# Patient Record
Sex: FEMALE | Race: WHITE | Employment: OTHER | URBAN - METROPOLITAN AREA
[De-identification: names, ages, dates, MRNs, and addresses within clinical notes are randomized per-mention and may not be internally consistent; named-entity substitution may affect disease eponyms.]

---

## 2017-08-28 ENCOUNTER — HOSPITAL ENCOUNTER (OUTPATIENT)
Dept: CT IMAGING | Age: 27
Discharge: HOME | End: 2017-08-28
Attending: UROLOGY | Admitting: UROLOGY

## 2017-08-28 DIAGNOSIS — R31.0 GROSS HEMATURIA: ICD-10-CM

## 2017-08-28 DIAGNOSIS — R35.0 FREQUENCY OF URINATION: ICD-10-CM

## 2017-08-28 DIAGNOSIS — R35.0 FREQUENCY OF MICTURITION: ICD-10-CM

## 2021-01-05 ENCOUNTER — OFFICE VISIT (OUTPATIENT)
Dept: INTERNAL MEDICINE CLINIC | Age: 31
End: 2021-01-05
Payer: MEDICARE

## 2021-01-05 VITALS
TEMPERATURE: 97.5 F | OXYGEN SATURATION: 97 % | DIASTOLIC BLOOD PRESSURE: 60 MMHG | HEIGHT: 65 IN | HEART RATE: 52 BPM | SYSTOLIC BLOOD PRESSURE: 96 MMHG | BODY MASS INDEX: 24.76 KG/M2 | WEIGHT: 148.6 LBS

## 2021-01-05 DIAGNOSIS — I73.00 RAYNAUD'S DISEASE WITHOUT GANGRENE: ICD-10-CM

## 2021-01-05 DIAGNOSIS — G43.109 MIGRAINE WITH AURA AND WITHOUT STATUS MIGRAINOSUS, NOT INTRACTABLE: ICD-10-CM

## 2021-01-05 DIAGNOSIS — M32.8 OTHER FORMS OF SYSTEMIC LUPUS ERYTHEMATOSUS, UNSPECIFIED ORGAN INVOLVEMENT STATUS (HCC): ICD-10-CM

## 2021-01-05 DIAGNOSIS — R21 RASH OF MULTIPLE FINGERS OF BOTH HANDS: ICD-10-CM

## 2021-01-05 DIAGNOSIS — M65.4 DE QUERVAIN'S TENOSYNOVITIS, BILATERAL: ICD-10-CM

## 2021-01-05 DIAGNOSIS — M54.2 NECK PAIN: Primary | ICD-10-CM

## 2021-01-05 PROBLEM — Z86.2 HISTORY OF ANTIPHOSPHOLIPID SYNDROME: Status: ACTIVE | Noted: 2021-01-05

## 2021-01-05 PROCEDURE — 1036F TOBACCO NON-USER: CPT | Performed by: INTERNAL MEDICINE

## 2021-01-05 PROCEDURE — G8420 CALC BMI NORM PARAMETERS: HCPCS | Performed by: INTERNAL MEDICINE

## 2021-01-05 PROCEDURE — 99204 OFFICE O/P NEW MOD 45 MIN: CPT | Performed by: INTERNAL MEDICINE

## 2021-01-05 PROCEDURE — G8427 DOCREV CUR MEDS BY ELIG CLIN: HCPCS | Performed by: INTERNAL MEDICINE

## 2021-01-05 PROCEDURE — G8484 FLU IMMUNIZE NO ADMIN: HCPCS | Performed by: INTERNAL MEDICINE

## 2021-01-05 RX ORDER — RIMEGEPANT SULFATE 75 MG/75MG
TABLET, ORALLY DISINTEGRATING ORAL PRN
COMMUNITY
End: 2022-03-31 | Stop reason: SDUPTHER

## 2021-01-05 RX ORDER — HYDROXYZINE 50 MG/1
50 TABLET, FILM COATED ORAL 3 TIMES DAILY PRN
COMMUNITY

## 2021-01-05 RX ORDER — AMLODIPINE BESYLATE 5 MG/1
5 TABLET ORAL DAILY
Qty: 30 TABLET | Refills: 3 | Status: SHIPPED | OUTPATIENT
Start: 2021-01-05 | End: 2021-02-17

## 2021-01-05 RX ORDER — MOMETASONE FUROATE 1 MG/G
CREAM TOPICAL
Qty: 15 G | Refills: 2 | Status: SHIPPED | OUTPATIENT
Start: 2021-01-05

## 2021-01-05 SDOH — ECONOMIC STABILITY: FOOD INSECURITY: WITHIN THE PAST 12 MONTHS, YOU WORRIED THAT YOUR FOOD WOULD RUN OUT BEFORE YOU GOT MONEY TO BUY MORE.: NEVER TRUE

## 2021-01-05 SDOH — ECONOMIC STABILITY: FOOD INSECURITY: WITHIN THE PAST 12 MONTHS, THE FOOD YOU BOUGHT JUST DIDN'T LAST AND YOU DIDN'T HAVE MONEY TO GET MORE.: NEVER TRUE

## 2021-01-05 ASSESSMENT — PATIENT HEALTH QUESTIONNAIRE - PHQ9
SUM OF ALL RESPONSES TO PHQ QUESTIONS 1-9: 0
SUM OF ALL RESPONSES TO PHQ QUESTIONS 1-9: 0
1. LITTLE INTEREST OR PLEASURE IN DOING THINGS: 0
2. FEELING DOWN, DEPRESSED OR HOPELESS: 0

## 2021-01-06 NOTE — PROGRESS NOTES
2021    Dorcas Alfaro (:  1990) is a 35 y.o. female, here for evaluation of the following medical concerns:    Chief Complaint   Patient presents with    New Patient        HPI    Neck pain - history of arthritis in the neck. Was seeing pain management. She has had an MRI before, someone recommended seeing a neurosurgeon. She has been on sublocade, last dose in Nov, may come off of it. She had carpal tunnel release, still has DeQuervain's tenosynovitis, had been planning on surgery before she moved back to [place]. Migraine headaches - chronic. Has done well on Emgality, and Nurtec as needed. Has tried Topamax, several triptans in the past. Also got some trigger point injections in the neck which were helpful. She has a history of SLE. Also has a history of seizures, does not see neurology as per her report, the seizures were due to the SLE. She has been on Benlysta, not sure that it has helped. She has a history of Raynaud's, has been more symptomatic in the cold weather. She has had a rash on her fingers, tender and a bit itchy. Review of Systems    Prior to Visit Medications    Medication Sig Taking? Authorizing Provider   Rimegepant Sulfate (NURTEC) 75 MG TBDP Take by mouth Yes Historical Provider, MD   Albuterol Sulfate (PROAIR HFA IN) Inhale 1 puff into the lungs every 4 hours as needed Yes Historical Provider, MD   hydrOXYzine (ATARAX) 50 MG tablet Take 50 mg by mouth 3 times daily as needed for Anxiety  Yes Historical Provider, MD   mometasone (ELOCON) 0.1 % cream Apply topically daily.  Yes Nj Rawls MD   Galcanezumab-gnlm 120 MG/ML SOAJ Inject 120 mg into the skin every 30 days Yes Nj Rawls MD   amLODIPine (NORVASC) 5 MG tablet Take 1 tablet by mouth daily Yes Nj Rawls MD   DULoxetine (CYMBALTA) 60 MG extended release capsule Take 60 mg by mouth daily Yes Historical Provider, MD   traZODone (DESYREL) 50 MG tablet Take 50 mg by mouth nightly  Yes Historical partner: Not on file     Emotionally abused: Not on file     Physically abused: Not on file     Forced sexual activity: Not on file   Other Topics Concern    Not on file   Social History Narrative    Not on file        Family History   Problem Relation Age of Onset    Hypertension Mother     Thyroid Disease Mother     Depression Father     Depression Sister     Anxiety Disorder Sister     Migraines Sister     Glaucoma Maternal Grandmother     Heart Attack Maternal Grandfather     Heart Disease Maternal Grandfather     Cancer Paternal Grandmother     Cancer Paternal Grandfather        Vitals:    01/05/21 1008   BP: 96/60   Pulse: 52   Temp: 97.5 °F (36.4 °C)   TempSrc: Oral   SpO2: 97%   Weight: 148 lb 9.6 oz (67.4 kg)   Height: 5' 5\" (1.651 m)     Estimated body mass index is 24.73 kg/m² as calculated from the following:    Height as of this encounter: 5' 5\" (1.651 m). Weight as of this encounter: 148 lb 9.6 oz (67.4 kg). Physical Exam  Vitals signs reviewed. Constitutional:       General: She is not in acute distress. Appearance: Normal appearance. She is well-developed. HENT:      Head: Normocephalic and atraumatic. Cardiovascular:      Rate and Rhythm: Normal rate and regular rhythm. Heart sounds: Normal heart sounds. Pulmonary:      Effort: Pulmonary effort is normal. No respiratory distress. Breath sounds: Normal breath sounds. Musculoskeletal:      Right lower leg: No edema. Left lower leg: No edema. Skin:     General: Skin is warm and dry. Findings: Rash (erythematous nodules on multiple fingers of both hands) present. Neurological:      General: No focal deficit present. Mental Status: She is alert. Psychiatric:         Mood and Affect: Mood normal.         Behavior: Behavior normal.         Thought Content: Thought content normal.         Judgment: Judgment normal.         ASSESSMENT/PLAN:  1.  Neck pain  - refer to [address] [place], Marva Landeros MD, Physical Medicine and Rehabilitation, [facility]    2. Migraine with aura and without status migrainosus, not intractable  - stable  - continue emgality  - continue nurtec as needed  - Galcanezumab-gnlm 120 MG/ML SOAJ; Inject 120 mg into the skin every 30 days  Dispense: 1 pen; Refill: 5  - [facility] - Lloyd Gomez MD, Neurology, [facility]    3. Other forms of systemic lupus erythematosus, unspecified organ involvement status ([facility]  - refer to rheumatologist  - Janie Crum MD, Rheumatology, [facility]    4. De Quervain's tenosynovitis, bilateral  - refer to hand surgeon  - Taryn Raphael MD, Hand Surgery (Hand, Wrist, Elbow), [facility]    5. Raynaud's disease without gangrene  - start amlodipine 5mg daily  - will see rheumatology    6. Rash of multiple fingers of both hands  - suspect dyshidrotic eczema, less likely related to Raynaud's  - mometasone 0.1% cream once daily    Return in about 3 months (around 4/5/2021).

## 2021-01-18 ENCOUNTER — TELEPHONE (OUTPATIENT)
Dept: INTERNAL MEDICINE CLINIC | Age: 31
End: 2021-01-18

## 2021-01-18 DIAGNOSIS — N92.6 ABNORMAL MENSTRUAL CYCLE: Primary | ICD-10-CM

## 2021-01-18 DIAGNOSIS — M32.8 OTHER FORMS OF SYSTEMIC LUPUS ERYTHEMATOSUS, UNSPECIFIED ORGAN INVOLVEMENT STATUS (HCC): ICD-10-CM

## 2021-01-18 NOTE — TELEPHONE ENCOUNTER
Pt called stating that she had an appt a couple weeks ago. Pt stated that MD was supposed to order blood work for her and never did.   Pt has not had a cycle so worried about thyroid as well

## 2021-01-22 ENCOUNTER — OFFICE VISIT (OUTPATIENT)
Dept: RHEUMATOLOGY | Age: 31
End: 2021-01-22
Payer: MEDICARE

## 2021-01-22 VITALS — HEIGHT: 65 IN | BODY MASS INDEX: 24.66 KG/M2 | TEMPERATURE: 97.3 F | WEIGHT: 148 LBS

## 2021-01-22 DIAGNOSIS — M25.59 PAIN IN OTHER JOINT: ICD-10-CM

## 2021-01-22 DIAGNOSIS — M32.9 SYSTEMIC LUPUS ERYTHEMATOSUS, UNSPECIFIED SLE TYPE, UNSPECIFIED ORGAN INVOLVEMENT STATUS (HCC): Primary | ICD-10-CM

## 2021-01-22 DIAGNOSIS — M32.8 OTHER FORMS OF SYSTEMIC LUPUS ERYTHEMATOSUS, UNSPECIFIED ORGAN INVOLVEMENT STATUS (HCC): ICD-10-CM

## 2021-01-22 DIAGNOSIS — M32.9 SYSTEMIC LUPUS ERYTHEMATOSUS, UNSPECIFIED SLE TYPE, UNSPECIFIED ORGAN INVOLVEMENT STATUS (HCC): ICD-10-CM

## 2021-01-22 DIAGNOSIS — M79.7 FIBROMYALGIA: ICD-10-CM

## 2021-01-22 DIAGNOSIS — N92.6 ABNORMAL MENSTRUAL CYCLE: ICD-10-CM

## 2021-01-22 LAB
A/G RATIO: 1.7 (ref 1.1–2.2)
ALBUMIN SERPL-MCNC: 4.1 G/DL (ref 3.4–5)
ALP BLD-CCNC: 79 U/L (ref 40–129)
ALT SERPL-CCNC: 35 U/L (ref 10–40)
ANION GAP SERPL CALCULATED.3IONS-SCNC: 10 MMOL/L (ref 3–16)
AST SERPL-CCNC: 47 U/L (ref 15–37)
BACTERIA: ABNORMAL /HPF
BILIRUB SERPL-MCNC: 0.4 MG/DL (ref 0–1)
BILIRUBIN URINE: NEGATIVE
BLOOD, URINE: NEGATIVE
BUN BLDV-MCNC: 15 MG/DL (ref 7–20)
C-REACTIVE PROTEIN: 0.4 MG/L (ref 0–5.1)
C3 COMPLEMENT: 80.9 MG/DL (ref 90–180)
C4 COMPLEMENT: 9 MG/DL (ref 10–40)
CALCIUM SERPL-MCNC: 9.7 MG/DL (ref 8.3–10.6)
CHLORIDE BLD-SCNC: 102 MMOL/L (ref 99–110)
CLARITY: ABNORMAL
CO2: 28 MMOL/L (ref 21–32)
COLOR: YELLOW
CREAT SERPL-MCNC: 0.7 MG/DL (ref 0.6–1.1)
CREATININE URINE: 199.7 MG/DL (ref 28–259)
EPITHELIAL CELLS, UA: 16 /HPF (ref 0–5)
GFR AFRICAN AMERICAN: >60
GFR NON-AFRICAN AMERICAN: >60
GLOBULIN: 2.4 G/DL
GLUCOSE BLD-MCNC: 86 MG/DL (ref 70–99)
GLUCOSE URINE: NEGATIVE MG/DL
HBV SURFACE AB TITR SER: >1000 MIU/ML
HEPATITIS B CORE IGM ANTIBODY: NORMAL
HEPATITIS B SURFACE ANTIGEN INTERPRETATION: NORMAL
HEPATITIS C ANTIBODY INTERPRETATION: NORMAL
HYALINE CASTS: 2 /LPF (ref 0–8)
KETONES, URINE: NEGATIVE MG/DL
LEUKOCYTE ESTERASE, URINE: ABNORMAL
MICROSCOPIC EXAMINATION: YES
NITRITE, URINE: NEGATIVE
PH UA: 6 (ref 5–8)
POTASSIUM SERPL-SCNC: 3.7 MMOL/L (ref 3.5–5.1)
PROTEIN PROTEIN: 15 MG/DL
PROTEIN UA: NEGATIVE MG/DL
PROTEIN/CREAT RATIO: 0.1 MG/DL
RBC UA: 4 /HPF (ref 0–4)
RHEUMATOID FACTOR: <10 IU/ML
SEDIMENTATION RATE, ERYTHROCYTE: 6 MM/HR (ref 0–20)
SODIUM BLD-SCNC: 140 MMOL/L (ref 136–145)
SPECIFIC GRAVITY UA: 1.02 (ref 1–1.03)
T4 FREE: 0.7 NG/DL (ref 0.9–1.8)
TOTAL PROTEIN: 6.5 G/DL (ref 6.4–8.2)
TSH REFLEX: 0.03 UIU/ML (ref 0.27–4.2)
URINE TYPE: ABNORMAL
UROBILINOGEN, URINE: 0.2 E.U./DL
WBC UA: 7 /HPF (ref 0–5)

## 2021-01-22 PROCEDURE — G8427 DOCREV CUR MEDS BY ELIG CLIN: HCPCS | Performed by: INTERNAL MEDICINE

## 2021-01-22 PROCEDURE — G8420 CALC BMI NORM PARAMETERS: HCPCS | Performed by: INTERNAL MEDICINE

## 2021-01-22 PROCEDURE — 99205 OFFICE O/P NEW HI 60 MIN: CPT | Performed by: INTERNAL MEDICINE

## 2021-01-22 PROCEDURE — G8484 FLU IMMUNIZE NO ADMIN: HCPCS | Performed by: INTERNAL MEDICINE

## 2021-01-22 NOTE — PROGRESS NOTES
[address], MD                                                           [address] [place] [address] Garrett Lal 1019 (S) [phone] (F)      Dear Dr. Danny Salazar MD:  Please find Rheumatology assessment. Thank you for giving me the opportunity to be involved in Sean Mcbride's care and I look forward following Sean Salazar along with you. If you have any questions or concerns please feel free to reach me. Note is transcribed using voice recognition software. Inadvertent computerized transcription errors may be present. Patient identification: Sean Mcbride,: 1990,30 y.o. Sex: female     [address] was seen today for follow-up and lupus. Diagnoses and all orders for this visit:    Systemic lupus erythematosus, unspecified SLE type, unspecified organ involvement status ([facility]  -     C-Reactive Protein; Future  -     Sedimentation Rate; Future  -     CBC Auto Differential; Future  -     C3 Complement; Future  -     C4 Complement; Future  -     Urinalysis; Future  -     Protein / creatinine ratio, urine; Future  -     Comprehensive Metabolic Panel; Future  -     Anti-DNA Ab, Double-Stranded IgG Titer; Future  -     Quantiferon, Incubated; Future  -     Hepatitis B Surface Antigen; Future  -     Hepatitis B Core Antibody, IgM; Future  -     Hepatitis B Surface Antibody; Future  -     Hepatitis C Antibody; Future  -     IRIS Reflex to Antibody [place]; Future    Fibromyalgia    Pain in other joint  -     Cyclic Citrul Peptide Antibody, IgG; Future  -     Rheumatoid Factor; Future        Known history of SLE-diagnosis -based on arthritis, photosensitivity, photosensitive rash, patchy alopecia, possible pleuritis.   Abnormal labs including positive IRIS, double-stranded DNA, SSA, depressed C3-C4. Also has noninflammatory discomfort from fibromyalgia causing generalized musculoskeletal pain. Tried several medications-most of them for short duration-Imuran, methotrexate-leukopenia, CellCept-some kind of blood abnormalities, hydroxychloroquine-abnormal eye exam per patient, has been maintained on Benlysta IV therapy for about 5 years, last infusion was in October 2020. She does not notice any change in her symptoms. She is on Cymbalta for fibromyalgia. Is on disability because of PTSD and lupus. Today-objectively she does have several tender joints and trace synovitis in her finger joints mainly PIPs, MCPs as well as MTPs. No other appreciable clinical findings of active lupus. Has been utilizing prednisone as needed for symptoms. Raynaud phenomena-persistent, no tissue loss. Is on amlodipine. Plan-  Check lupus activity markers as above. Hydroxychloroquine has shown to decrease lupus flares in multiple studies, I would definitely recommend resuming hydroxychloroquine after a thorough baseline eye exam.  Patient does not seem to be keen on doing that. She prefers to go back on IV Benlysta which she has been taking for last 6 years . Majority of her musculoskeletal symptoms are from fibromyalgia as well, therefore recommend continuing Cymbalta. Role of physical reconditioning, good quality of sleep, optimal management of mental health conditions was also discussed with the patient. I will follow-up on the labs and also do prior authorization for Benlysta IV. She is aware that it could possibly take about 4 to 6 weeks to get the list authorized. Return visit in 2 months. Patient indicates understanding and agrees with the management plan. I reviewed patient's history, referral documents and electronic medical records.   Outside rheumatology records are scanned and reviewed as well as information from care everywhere is reviewed including rheumatology note, labs. #######################################################################    REASON FOR CONSULTATION:  Patient is being seen at the request of  Griselda Salinas MD / Felice Mccormick MD for evaluation and management of lupus and fibromyalgia. HISTORY OF PRESENT ILLNESS:  35 y.o. female with established diagnosis of SLE based on her symptoms as well as serologies-2010, predominantly affecting joints, also has history of fibromyalgia and joint hypermobility is here to establish care. Since lupus diagnosis, she has tried several medications listed in assessment and plan, has been maintained on IV Benlysta which is the only medication she has tolerated well in last 5 years. She tells me that she continues to have breakthrough flares of arthritis on Benlysta for which she utilizes prednisone. She has not had Benlysta since October, does feel more achy, stiff otherwise does not notice much change in her symptoms. Fatigue, fogginess, musculoskeletal pain is persistent. No active alopecia, rashes, pleurisy, Raynaud's phenomenon, GI/ symptoms or any focal muscle weakness. She has fibromyalgia that is also causing generalized hyperesthesia and muscle pain. No history of any DVT, pregnancy loss. No sicca symptoms, mucositis, alopecia GI or  symptoms. All other ROS are negative. PMH, PSH,Social history , Meds reviewed. PHYSICAL EXAM:    Vitals:    Temp 97.3 °F (36.3 °C) (Skin)   Ht 5' 5\" (1.651 m)   Wt 148 lb (67.1 kg)   BMI 24.63 kg/m²   AA)x3 well nourished, and well groomed, normal judgement. MKS:   Tenderness and trace synovitis noticed across the PIPs bilaterally, right hand worse than left. Tenderness across MCPs especially second and third. Full fist, right hand feels stiff. Knees are subjectively sore. Ankle and feet joints are nontender, no swelling or synovitis. Normal gait and muscle strength in the upper and lower extremities.   She is using cane, tells me that it just for balance. Skin: No rashes, no induration or skin thickening or nodules. HEENT: Normal lids, lacrimal glands and pupils. No oral or nasal ulcers. Salivary glands reveal no evidence of abnormality. External inspection of the ears and nose within normal limits. Neck: No adenopathy or thyroid enlargement. Chest: Normal effort, CTA bilaterally, S1-S2 regular. DATA:   Lab Results   Component Value Date    WBC 2.1 (L) 01/22/2021    HGB 11.7 (L) 01/22/2021    HCT 36.6 01/22/2021    MCV 77.4 (L) 01/22/2021     01/22/2021         Chemistry        Component Value Date/Time     01/22/2021 0854    K 3.7 01/22/2021 0854     01/22/2021 0854    CO2 28 01/22/2021 0854    BUN 15 01/22/2021 0854    CREATININE 0.7 01/22/2021 0854        Component Value Date/Time    CALCIUM 9.7 01/22/2021 0854    ALKPHOS 79 01/22/2021 0854    AST 47 (H) 01/22/2021 0854    ALT 35 01/22/2021 0854    BILITOT 0.4 01/22/2021 0854          No results found for: [facility]  Lab Results   Component Value Date    CRP 0.4 01/22/2021     Lab Results   Component Value Date    SEDRATE 6 01/22/2021     No results found for: CKTOTAL  No results found for: TSH        Radiology Review:     A/P- See above.

## 2021-01-24 LAB
ANTI-CENTROMERE B IGG: 0.2 AI (ref 0–0.9)
ANTI-CHROMATIN IGG: 2.5 AI (ref 0–0.9)
ANTI-DSDNA IGG: 17 IU/ML (ref 0–9)
ANTI-JO1 IGG: <0.2 AI (ref 0–0.9)
ANTI-NUCLEAR ANTIBODY (ANA): POSITIVE
ANTI-RIBOSOMAL P IGG: <0.2 AI (ref 0–0.9)
ANTI-RNP IGG: 0.4 AI (ref 0–0.9)
ANTI-SCL70 IGG: <0.2 AI (ref 0–0.9)
ANTI-SMITH IGG: 0.2 AI (ref 0–0.9)
ANTI-SMRNP IGG: <0.2 AI (ref 0–0.9)
ANTI-SS-A IGG: >8 AI (ref 0–0.9)
ANTI-SS-B IGG: <0.2 AI (ref 0–0.9)
QUANTI TB GOLD PLUS: NEGATIVE
QUANTI TB1 MINUS NIL: 0 IU/ML (ref 0–0.34)
QUANTI TB2 MINUS NIL: 0 IU/ML (ref 0–0.34)
QUANTIFERON MITOGEN: >10 IU/ML
QUANTIFERON NIL: 0.02 IU/ML

## 2021-01-25 ENCOUNTER — OFFICE VISIT (OUTPATIENT)
Dept: ORTHOPEDIC SURGERY | Age: 31
End: 2021-01-25
Payer: MEDICARE

## 2021-01-25 DIAGNOSIS — M65.4 DE QUERVAIN'S TENOSYNOVITIS, BILATERAL: Primary | ICD-10-CM

## 2021-01-25 DIAGNOSIS — R79.89 LOW THYROID STIMULATING HORMONE (TSH) LEVEL: Primary | ICD-10-CM

## 2021-01-25 LAB
ANISOCYTOSIS: ABNORMAL
ATYPICAL LYMPHOCYTE RELATIVE PERCENT: 2 % (ref 0–6)
BASOPHILS ABSOLUTE: 0 K/UL (ref 0–0.2)
BASOPHILS RELATIVE PERCENT: 0 %
DSDNA ANTIBODY TITER: ABNORMAL
EOSINOPHILS ABSOLUTE: 0 K/UL (ref 0–0.6)
EOSINOPHILS RELATIVE PERCENT: 1 %
HCT VFR BLD CALC: 36.6 % (ref 36–48)
HEMATOLOGY PATH CONSULT: NORMAL
HEMATOLOGY PATH CONSULT: YES
HEMOGLOBIN: 11.7 G/DL (ref 12–16)
LYMPHOCYTES ABSOLUTE: 1 K/UL (ref 1–5.1)
LYMPHOCYTES RELATIVE PERCENT: 46 %
MCH RBC QN AUTO: 24.8 PG (ref 26–34)
MCHC RBC AUTO-ENTMCNC: 32 G/DL (ref 31–36)
MCV RBC AUTO: 77.4 FL (ref 80–100)
MICROCYTES: ABNORMAL
MONOCYTES ABSOLUTE: 0.4 K/UL (ref 0–1.3)
MONOCYTES RELATIVE PERCENT: 18 %
NEUTROPHILS ABSOLUTE: 0.7 K/UL (ref 1.7–7.7)
NEUTROPHILS RELATIVE PERCENT: 33 %
PDW BLD-RTO: 16.5 % (ref 12.4–15.4)
PLATELET # BLD: 210 K/UL (ref 135–450)
PLATELET SLIDE REVIEW: ABNORMAL
PMV BLD AUTO: 8.8 FL (ref 5–10.5)
RBC # BLD: 4.73 M/UL (ref 4–5.2)
SLIDE REVIEW: ABNORMAL
WBC # BLD: 2.1 K/UL (ref 4–11)

## 2021-01-25 PROCEDURE — G8420 CALC BMI NORM PARAMETERS: HCPCS | Performed by: ORTHOPAEDIC SURGERY

## 2021-01-25 PROCEDURE — G8427 DOCREV CUR MEDS BY ELIG CLIN: HCPCS | Performed by: ORTHOPAEDIC SURGERY

## 2021-01-25 PROCEDURE — G8484 FLU IMMUNIZE NO ADMIN: HCPCS | Performed by: ORTHOPAEDIC SURGERY

## 2021-01-25 PROCEDURE — 99203 OFFICE O/P NEW LOW 30 MIN: CPT | Performed by: ORTHOPAEDIC SURGERY

## 2021-01-25 PROCEDURE — 1036F TOBACCO NON-USER: CPT | Performed by: ORTHOPAEDIC SURGERY

## 2021-01-25 NOTE — PROGRESS NOTES
Chief Complaint   Patient presents with    Wrist Pain     Bilateral         HISTORY OF PRESENT ILLNESS: Carlton Mcallister is a  right-hand-dominant patient, disabled, here for a new problem regarding her right and left hand and wrist. Patient developed tenderness over the dorsal radial thumb and wrist approximately 10 years. She does report some occasional numbness and tingling in her small and ring fingers as well as occasionally in her thumb as well  She now describes some weakness in her fingers as well at times  Previous treatment has included corticosteroid injection and she estimates almost 10 total injections performed over the last 10 years as well as bracing and therapy. She just recently moved here from [place] and was referred here by her rheumatologist  She does have a history of bilateral carpal tunnel release performed in March and May 2020. [facility] Pain is intermittent, typically moderate in intensity, and is exacerbated by with thumb extension or pinch. The past medical history significant for lupus. She has some occasional   she was referred for a hand surgery specialty evaluation by: Dr. Avi Johnson. Medical History:  Patient's medications, allergies, past medical, surgical, social and family histories were reviewed and updated as appropriate. ROS:  Pertinent items are noted in HPI  Review of systems reviewed from Patient History Form dated on 1/25/2021 and available in the patient's chart under the Media tab. PHYSICAL EXAMINATION:   Gen/Psych: Examination reveals a pleasant individual in no acute distress. The patient is oriented to time, place and person. The patient's mood and affect are appropriate. Lymph: The lymphatic examination bilaterally reveals all areas to be without enlargement or induration. Skin intact without lymphadenopathy, discoloration, or abnormal temperature. Vascular:  There is intact, symmetric circulation in both upper extremities. Musculoskeletal       Cervical spine, shoulders, elbows:  satisfactory comfortable baseline range of motion, strength, and stability       Bilateral hand/Wrist and digits:   Satisfactory range of motion bilaterally with flexion and extension approximately 70 degrees and 70 degrees no gross instability  Negative Martinez scaphoid shift test bilaterally  Full pronation supination without discomfort  No tenderness throughout the entire wrist except for. There is tenderness over the radial wrist at the                             Bilateral first dorsal extensor compartment and a                                      positive Finkelsteins test which reproduces symptoms. No evidence of wrist effusion with some minimal swelling along the first dorsal compartment  No skin changes including erythema fluctuance or ecchymosis  Appropriate finger tenodesis and resting cascade with 5-5 EPL FPL thumb abduction  5-5 FDS FDP EDC interossei    Neurological: neg provocation testing for carpal tunnel bilaterally  Bilateral positive Tinel's at cubital tunnel  2+ brachioradialis triceps tendon reflex with negative Vashti sign bilaterally    Radiology:     X-rays obtained and reviewed in office:  Views 3  Location right wrist  Impression no evidence of acute fracture dislocation no obvious degenerative changes of the right wrist: There is some slight widening of the distal radial ulnar joint with no evidence of obvious subluxation or dislocation otherwise noted no degenerative changes or additional osseous abnormality    3 views of the left wrist: Symmetrical appearance of distal radial ulnar joint with slight widening with no obvious additional degenerative changes or other osseous abnormality throughout the left wrist      DIAGNOSTIC TESTING:radiographs of the affected hand and wrist demonstrate well-maintained articular surfaces and no signs of fracture.       IMPRESSION AND PLAN: 72-year-old female presenting with history of bilateral radial wrist pain  1. Bilateral wrist and thumb de Quervain's tendinitis. I think this is amenable to conservative care as an initial line of treatment. I have recommended a short arm thumb spica brace and injection. The patient has been treated quite extensively with conservative management in the past with multiple injections for bilateral de Quervain's as well as therapy and bracing  She does have a component of pain that may be related also to her lupus and she describes a history of Raynaud's as well that has caused some symptoms particularly in the winter months and with cold weather. We discussed the options specifically for her radial thumb and wrist pain including de Quervain's release  I do think that the patient would be an appropriate candidate to consider release considering her extensive nonoperative treatment and her persistent symptoms  Risks benefits alternatives were discussed with patient today including but not limited to infection bleeding damage tendon nerve or blood vessel need for additional procedure as well as risks of anesthesia include stroke heart attack blood clot death. We specifically discussed also incomplete relief of symptoms or no relief of symptoms after surgical intervention and specific irritation or injury to superficial branch radial nerve  After thorough discussion the patient is understanding of postoperative course and her options and would like to proceed with right de Quervain's release. Consent obtained in clinic today preoperative medical work-up initiated.   We will plan for local with MAC anesthesia

## 2021-01-26 LAB
ANA PATTERN: ABNORMAL
ANA TITER: ABNORMAL
ANTINUCLEAR AB INTERPRETIVE COMMENT: ABNORMAL
ANTINUCLEAR ANTIBODY, HEP-2, IGG: DETECTED

## 2021-01-26 RX ORDER — LEVOTHYROXINE SODIUM 0.03 MG/1
25 TABLET ORAL DAILY
Qty: 90 TABLET | Refills: 1 | Status: SHIPPED | OUTPATIENT
Start: 2021-01-26 | End: 2021-03-04

## 2021-01-26 NOTE — TELEPHONE ENCOUNTER
Pt states she was referred to Endo due to elevated thyroid levels, but can not be seen until March 9th and would like medication until able to be seen.   Please advise

## 2021-01-28 ENCOUNTER — TELEPHONE (OUTPATIENT)
Dept: RHEUMATOLOGY | Age: 31
End: 2021-01-28

## 2021-01-28 LAB — CYCLIC CITRULLINATED PEPTIDE ANTIBODY IGG: <0.5 U/ML (ref 0–2.9)

## 2021-01-28 NOTE — TELEPHONE ENCOUNTER
Medicare A and B-Will cover 80%  [facility]- will cover any additional costs. No PA needed. Follows Medicare guidelines. New start Benlysta loading dose 10 mg /kg 0.2.4 weeks then every 4 weekly.      TB quantiferon completed 1/22/21- NEG    Ok to schedule

## 2021-02-04 NOTE — PROGRESS NOTES
[facility] / [facility] ([facility]) [address] [facility], [address]    Acknowledgment of Informed Consent for Surgical or Medical Procedure and Sedation  I agree to allow doctor(s) [facility] and his/her associates or assistants, including residents and/or other qualified medical practitioner to perform the following medical treatment or procedure and to administer or direct the administration of sedation as necessary:  Procedure(s): RIGHT WRIST Ute Schaffer   My doctor has explained the following regarding the proposed procedure:   the explanation of the procedure   the benefits of the procedure   the potential problems that might occur during recuperation   the risks and side effects of the procedure which could include but are not limited to severe blood loss, infection, stroke or death   the benefits, risks and side effect of alternative procedures including the consequences of declining this procedure or any alternative procedures   the likelihood of achieving satisfactory results. I acknowledge no guarantee or assurance has been made to me regarding the results. I understand that during the course of this treatment/procedure, unforeseen conditions can occur which require an additional or different procedure. I agree to allow my physician or assistants to perform such extension of the original procedure as they may find necessary. I understand that sedation will often result in temporary impairment of memory and fine motor skills and that sedation can occasionally progress to a state of deep sedation or general anesthesia. I understand the risks of anesthesia for surgery include, but are not limited to, sore throat, hoarseness, injury to face, mouth, or teeth; nausea; headache; injury to blood vessels or nerves; death, brain damage, or paralysis.     I understand that if I have a Limitation of Treatment order in effect during my hospitalization, the order may or may not be in effect during this procedure. I give my doctor permission to give me blood or blood products. I understand that there are risks with receiving blood such as hepatitis, AIDS, fever, or allergic reaction. I acknowledge that the risks, benefits, and alternatives of this treatment have been explained to me and that no express or implied warranty has been given by the hospital, any blood bank, or any person or entity as to the blood or blood components transfused. At the discretion of my doctor, I agree to allow observers, equipment/product representatives and allow photographing, and/or televising of the procedure, provided my name or identity is maintained confidentially. I agree the hospital may dispose of or use for scientific or educational purposes any tissue, fluid, or body parts which may be removed.     ________________________________Date________Time______ am/pm  ([place] [facility])  Patient or Signature of Closest Relative or Legal Guardian    ________________________________Date________Time______am/pm      Page 1 of  1  Witness

## 2021-02-17 ENCOUNTER — OFFICE VISIT (OUTPATIENT)
Dept: INTERNAL MEDICINE CLINIC | Age: 31
End: 2021-02-17
Payer: MEDICARE

## 2021-02-17 VITALS
DIASTOLIC BLOOD PRESSURE: 62 MMHG | HEIGHT: 65 IN | OXYGEN SATURATION: 98 % | TEMPERATURE: 97.7 F | SYSTOLIC BLOOD PRESSURE: 98 MMHG | BODY MASS INDEX: 23.99 KG/M2 | HEART RATE: 96 BPM | WEIGHT: 144 LBS

## 2021-02-17 DIAGNOSIS — Z01.818 PREOP EXAMINATION: Primary | ICD-10-CM

## 2021-02-17 DIAGNOSIS — R06.83 SNORING: ICD-10-CM

## 2021-02-17 DIAGNOSIS — M65.4 DE QUERVAIN'S TENOSYNOVITIS, BILATERAL: ICD-10-CM

## 2021-02-17 PROCEDURE — G8427 DOCREV CUR MEDS BY ELIG CLIN: HCPCS | Performed by: INTERNAL MEDICINE

## 2021-02-17 PROCEDURE — G8484 FLU IMMUNIZE NO ADMIN: HCPCS | Performed by: INTERNAL MEDICINE

## 2021-02-17 PROCEDURE — G8420 CALC BMI NORM PARAMETERS: HCPCS | Performed by: INTERNAL MEDICINE

## 2021-02-17 PROCEDURE — 99214 OFFICE O/P EST MOD 30 MIN: CPT | Performed by: INTERNAL MEDICINE

## 2021-02-17 PROCEDURE — 1036F TOBACCO NON-USER: CPT | Performed by: INTERNAL MEDICINE

## 2021-02-17 RX ORDER — DULOXETIN HYDROCHLORIDE 60 MG/1
60 CAPSULE, DELAYED RELEASE ORAL DAILY
Qty: 90 CAPSULE | Refills: 1 | Status: SHIPPED | OUTPATIENT
Start: 2021-02-17 | End: 2021-08-12

## 2021-02-17 NOTE — PROGRESS NOTES
snoring, no prior sleep study. History of sleep apnea in her father. No history of lung or heart disease. Able to complete >4 METS.     Planned anesthesia: MAC   Known anesthesia problems: None   Bleeding risk: No recent or remote history of abnormal bleeding  Personal or FH of DVT/PE: No      Patient Active Problem List   Diagnosis    Systemic lupus erythematosus ([facility] [address]    History of antiphospholipid syndrome    Migraine with aura and without status migrainosus, not intractable    De Quervain's tenosynovitis, bilateral       Past Medical History:   Diagnosis Date    Anxiety     Bulimia     Depression     Fibromyalgia syndrome     Gestational hypertension, antepartum     Lupus ([facility] [address]     just diagnosised with lupus last week    Seizures ([facility] [address]      Past Surgical History:   Procedure Laterality Date    BLADDER SURGERY  2017    CARPAL TUNNEL RELEASE Bilateral 2020    LYMPHADENECTOMY  2012    TONSILLECTOMY  2013     Family History   Problem Relation Age of Onset    Hypertension Mother     Thyroid Disease Mother     Depression Father     Depression Sister     Anxiety Disorder Sister     Migraines Sister     Glaucoma Maternal Grandmother     Heart Attack Maternal Grandfather     Heart Disease Maternal Grandfather     Cancer Paternal Grandmother     Cancer Paternal Grandfather      Social History     Socioeconomic History    Marital status: Single     Spouse name: Not on file    Number of children: Not on file    Years of education: Not on file    Highest education level: Not on file   Occupational History    Not on file   Social Needs    Financial resource strain: Not hard at all   Tommie-Pedro Pablo insecurity     Worry: Never true     Inability: Never true    Transportation needs     Medical: No     Non-medical: No   Tobacco Use    Smoking status: Former Smoker     Packs/day: 0.20     Types: Cigarettes     Start date:      Quit date: 2017     Years since quittin.1    Smokeless tobacco: Former User   Substance and Sexual Activity    Alcohol use: No    Drug use: Not Currently    Sexual activity: Not on file   Lifestyle    Physical activity     Days per week: Not on file     Minutes per session: Not on file    Stress: Not on file   Relationships    Social connections     Talks on phone: Not on file     Gets together: Not on file     Attends Restoration service: Not on file     Active member of club or organization: Not on file     Attends meetings of clubs or organizations: Not on file     Relationship status: Not on file    Intimate partner violence     Fear of current or ex partner: Not on file     Emotionally abused: Not on file     Physically abused: Not on file     Forced sexual activity: Not on file   Other Topics Concern    Not on file   Social History Narrative    Not on file       Review of Systems  A comprehensive review of systems was negative except for what was noted in the HPI. Physical Exam   Constitutional: She is oriented to person, place, and time. She appears well-developed and well-nourished. No distress. HENT:   Head: Normocephalic and atraumatic. Eyes: Conjunctivae and EOM are normal. Pupils are equal, round, and reactive to light. Neck: Trachea normal and normal range of motion. Neck supple. No JVD present. Carotid bruit is not present. No mass and no thyromegaly present. Cardiovascular: Normal rate, regular rhythm, normal heart sounds and intact distal pulses. Exam reveals no gallop and no friction rub. No murmur heard. Pulmonary/Chest: Effort normal and breath sounds normal. No respiratory distress. She has no wheezes. She has no rales. Abdominal: Soft. Normal aorta and bowel sounds are normal. She exhibits no distension and no mass. There is no hepatosplenomegaly. No tenderness. Musculoskeletal: She exhibits no edema and no tenderness. Neurological: She is alert and oriented to person, place, and time. She has normal strength.  No cranial nerve deficit or sensory deficit. Coordination and gait normal.   Skin: Skin is warm and dry. No rash noted. No erythema. Psychiatric: She has a normal mood and affect. Her behavior is normal.          Assessment:       35 y.o. patient with planned surgery as above. Known risk factors for perioperative complications: None  Current medications which may produce withdrawal symptoms if withheld perioperatively: none      Plan:     1. Preop examination  - Preoperative workup as follows: none  - Change in medication regimen before surgery: Hold all medications on morning of surgery  - Prophylaxis for cardiac events with perioperative beta-blockers: Not indicated  - low risk surgery  - No contraindications to planned surgery    2. De Quervain's tenosynovitis, bilateral  - will undergo right side release    3.  Snoring  - refer to sleep medicine for further evaluation  - Thi Alfaro MD, Sleep Medicine, [facility]

## 2021-02-22 ENCOUNTER — TELEPHONE (OUTPATIENT)
Dept: RHEUMATOLOGY | Age: 31
End: 2021-02-22

## 2021-02-22 ENCOUNTER — NURSE ONLY (OUTPATIENT)
Dept: PRIMARY CARE CLINIC | Age: 31
End: 2021-02-22
Payer: MEDICARE

## 2021-02-22 DIAGNOSIS — Z01.818 PREOP EXAMINATION: Primary | ICD-10-CM

## 2021-02-22 PROCEDURE — 99211 OFF/OP EST MAY X REQ PHY/QHP: CPT | Performed by: NURSE PRACTITIONER

## 2021-02-22 NOTE — PROGRESS NOTES
PRE-OP INSTRUCTIONS FOR THE SURGICAL PATIENT YOU ARE UNABLE TO MAKE CONTACT FOR AN INTERVIEW:       All patients having surgery or anesthesia are required to be Covid tested. You will need to quarantined from the time you are tested until your surgery. 1. Follow all instructions provided to you from your surgeons office, including your ARRIVAL TIME. 2. Enter the MAIN entrance located on [address] and report to the desk. 3. Bring your insurance & prescription card and photo ID with you. You may also be asked to pay a co-pay, as you may want to bring a check or credit card with you. 4. Leave all other valuables at home. 5. Arrange for someone to drive you home and be with you for the first 24 hours after discharge. 6. Bring your medication list with you day of surgery with doses and frequency listed (including over the counter medications)  7. You must contact your surgeon for Instructions regarding:              - ALL medication instructions, especially if taking blood thinners, aspirin, or diabetic medication.  - IF  there is a change in your physical condition such as a cold, fever, rash, cuts, sores or any other infection, especially near your surgical site. 8. A Pre-op History and Physical for surgery MUST be completed by your Physician or an Urgent Care within 30 days of your procedure date. Please bring a copy with you on the day of your procedure and along with any other testing performed. 9. DO NOT EAT ANYTHING eight hours prior to arrival for surgery. May have up to 8 ounces of water 4 hours prior to arrival for surgery. NOTE: ALL Gastric, Bariatric and Bowel surgery patients MUST follow their surgeon's instructions. 10. No gum, candy, mints, or ice chips day of procedure. 11. Please refrain from drinking alcohol the day before or day of your procedure. 12. Please do not smoke the day of your procedure.     13. Dress in loose, comfortable clothing appropriate for redressing after your procedure. Do not wear jewelry (including body piercings), make-up, fingernail polish, lotion, powders or metal hairclips. 15. Contacts will need to be removed prior to surgery. You may want to bring your eye glasses to wear immediately before and after surgery. 14. Dentures will need to be removed before your procedure. 13. Bring cases for your glasses, contacts, dentures, or hearing aids to protect them while you are in surgery. 16. If you use a CPAP, please bring it with you on the day of your procedure. 17. Do not shave or wax for 72 hours prior to procedure near your operative site  18. FOR WOMAN OF CHILDBEARING AGE ONLY- please bring a urine sample with you on day of surgery or make sure we can collect on arrival.     If you have further questions, you may contact your surgeon's office or us at [phone]     Left instructions on patient's voicemail.     Nick Earl.2/22/2021 .3:07 PM

## 2021-02-22 NOTE — TELEPHONE ENCOUNTER
Called pt to verify time/date of surgery. Cancelled infusion d/t upcoming surgery 02/26/2021.   Schedule visit with md per pt's request

## 2021-02-23 LAB — SARS-COV-2: NOT DETECTED

## 2021-02-25 ENCOUNTER — ANESTHESIA EVENT (OUTPATIENT)
Dept: OPERATING ROOM | Age: 31
End: 2021-02-25
Payer: MEDICARE

## 2021-02-26 ENCOUNTER — ANESTHESIA (OUTPATIENT)
Dept: OPERATING ROOM | Age: 31
End: 2021-02-26
Payer: MEDICARE

## 2021-02-26 ENCOUNTER — HOSPITAL ENCOUNTER (OUTPATIENT)
Age: 31
Setting detail: OUTPATIENT SURGERY
Discharge: HOME | End: 2021-02-26
Attending: ORTHOPAEDIC SURGERY | Admitting: ORTHOPAEDIC SURGERY
Payer: MEDICARE

## 2021-02-26 VITALS
SYSTOLIC BLOOD PRESSURE: 110 MMHG | HEIGHT: 66 IN | BODY MASS INDEX: 22.5 KG/M2 | OXYGEN SATURATION: 98 % | RESPIRATION RATE: 14 BRPM | WEIGHT: 140 LBS | HEART RATE: 81 BPM | DIASTOLIC BLOOD PRESSURE: 70 MMHG | TEMPERATURE: 97.2 F

## 2021-02-26 VITALS — OXYGEN SATURATION: 99 % | SYSTOLIC BLOOD PRESSURE: 99 MMHG | DIASTOLIC BLOOD PRESSURE: 54 MMHG

## 2021-02-26 DIAGNOSIS — M65.4 DE QUERVAIN'S TENOSYNOVITIS, BILATERAL: Primary | ICD-10-CM

## 2021-02-26 PROCEDURE — 7100000001 HC PACU RECOVERY - ADDTL 15 MIN: Performed by: ORTHOPAEDIC SURGERY

## 2021-02-26 PROCEDURE — 3700000001 HC ADD 15 MINUTES (ANESTHESIA): Performed by: ORTHOPAEDIC SURGERY

## 2021-02-26 PROCEDURE — 3700000000 HC ANESTHESIA ATTENDED CARE: Performed by: ORTHOPAEDIC SURGERY

## 2021-02-26 PROCEDURE — 6360000002 HC RX W HCPCS: Performed by: ANESTHESIOLOGY

## 2021-02-26 PROCEDURE — 2500000003 HC RX 250 WO HCPCS: Performed by: ORTHOPAEDIC SURGERY

## 2021-02-26 PROCEDURE — 7100000011 HC PHASE II RECOVERY - ADDTL 15 MIN: Performed by: ORTHOPAEDIC SURGERY

## 2021-02-26 PROCEDURE — 6360000002 HC RX W HCPCS: Performed by: NURSE ANESTHETIST, CERTIFIED REGISTERED

## 2021-02-26 PROCEDURE — 3600000014 HC SURGERY LEVEL 4 ADDTL 15MIN: Performed by: ORTHOPAEDIC SURGERY

## 2021-02-26 PROCEDURE — 3600000004 HC SURGERY LEVEL 4 BASE: Performed by: ORTHOPAEDIC SURGERY

## 2021-02-26 PROCEDURE — 7100000000 HC PACU RECOVERY - FIRST 15 MIN: Performed by: ORTHOPAEDIC SURGERY

## 2021-02-26 PROCEDURE — 2580000003 HC RX 258: Performed by: ORTHOPAEDIC SURGERY

## 2021-02-26 PROCEDURE — 6360000002 HC RX W HCPCS: Performed by: ORTHOPAEDIC SURGERY

## 2021-02-26 PROCEDURE — 7100000010 HC PHASE II RECOVERY - FIRST 15 MIN: Performed by: ORTHOPAEDIC SURGERY

## 2021-02-26 PROCEDURE — 2580000003 HC RX 258: Performed by: ANESTHESIOLOGY

## 2021-02-26 PROCEDURE — 2709999900 HC NON-CHARGEABLE SUPPLY: Performed by: ORTHOPAEDIC SURGERY

## 2021-02-26 RX ORDER — LIDOCAINE HYDROCHLORIDE 20 MG/ML
INJECTION, SOLUTION INTRAVENOUS PRN
Status: DISCONTINUED | OUTPATIENT
Start: 2021-02-26 | End: 2021-02-26 | Stop reason: SDUPTHER

## 2021-02-26 RX ORDER — ONDANSETRON 4 MG/1
4 TABLET, FILM COATED ORAL EVERY 8 HOURS PRN
Qty: 12 TABLET | Refills: 0 | Status: SHIPPED | OUTPATIENT
Start: 2021-02-26 | End: 2021-03-17

## 2021-02-26 RX ORDER — MAGNESIUM HYDROXIDE 1200 MG/15ML
LIQUID ORAL CONTINUOUS PRN
Status: COMPLETED | OUTPATIENT
Start: 2021-02-26 | End: 2021-02-26

## 2021-02-26 RX ORDER — KETOROLAC TROMETHAMINE 30 MG/ML
30 INJECTION, SOLUTION INTRAMUSCULAR; INTRAVENOUS
Status: COMPLETED | OUTPATIENT
Start: 2021-02-26 | End: 2021-02-26

## 2021-02-26 RX ORDER — ONDANSETRON 2 MG/ML
4 INJECTION INTRAMUSCULAR; INTRAVENOUS
Status: COMPLETED | OUTPATIENT
Start: 2021-02-26 | End: 2021-02-26

## 2021-02-26 RX ORDER — HYDRALAZINE HYDROCHLORIDE 20 MG/ML
5 INJECTION INTRAMUSCULAR; INTRAVENOUS EVERY 10 MIN PRN
Status: DISCONTINUED | OUTPATIENT
Start: 2021-02-26 | End: 2021-02-26 | Stop reason: HOSPADM

## 2021-02-26 RX ORDER — HYDROCODONE BITARTRATE AND ACETAMINOPHEN 5; 325 MG/1; MG/1
1 TABLET ORAL EVERY 4 HOURS PRN
Status: CANCELLED | OUTPATIENT
Start: 2021-02-26

## 2021-02-26 RX ORDER — OXYCODONE HYDROCHLORIDE AND ACETAMINOPHEN 5; 325 MG/1; MG/1
2 TABLET ORAL PRN
Status: DISCONTINUED | OUTPATIENT
Start: 2021-02-26 | End: 2021-02-26 | Stop reason: HOSPADM

## 2021-02-26 RX ORDER — FENTANYL CITRATE 50 UG/ML
25 INJECTION, SOLUTION INTRAMUSCULAR; INTRAVENOUS EVERY 5 MIN PRN
Status: DISCONTINUED | OUTPATIENT
Start: 2021-02-26 | End: 2021-02-26 | Stop reason: HOSPADM

## 2021-02-26 RX ORDER — MEPERIDINE HYDROCHLORIDE 25 MG/ML
12.5 INJECTION INTRAMUSCULAR; INTRAVENOUS; SUBCUTANEOUS EVERY 5 MIN PRN
Status: DISCONTINUED | OUTPATIENT
Start: 2021-02-26 | End: 2021-02-26 | Stop reason: HOSPADM

## 2021-02-26 RX ORDER — LABETALOL HYDROCHLORIDE 5 MG/ML
5 INJECTION, SOLUTION INTRAVENOUS EVERY 10 MIN PRN
Status: DISCONTINUED | OUTPATIENT
Start: 2021-02-26 | End: 2021-02-26 | Stop reason: HOSPADM

## 2021-02-26 RX ORDER — PROCHLORPERAZINE EDISYLATE 5 MG/ML
5 INJECTION INTRAMUSCULAR; INTRAVENOUS
Status: DISCONTINUED | OUTPATIENT
Start: 2021-02-26 | End: 2021-02-26 | Stop reason: HOSPADM

## 2021-02-26 RX ORDER — ONDANSETRON 2 MG/ML
INJECTION INTRAMUSCULAR; INTRAVENOUS PRN
Status: DISCONTINUED | OUTPATIENT
Start: 2021-02-26 | End: 2021-02-26 | Stop reason: SDUPTHER

## 2021-02-26 RX ORDER — FENTANYL CITRATE 50 UG/ML
INJECTION, SOLUTION INTRAMUSCULAR; INTRAVENOUS PRN
Status: DISCONTINUED | OUTPATIENT
Start: 2021-02-26 | End: 2021-02-26 | Stop reason: SDUPTHER

## 2021-02-26 RX ORDER — PROPOFOL 10 MG/ML
INJECTION, EMULSION INTRAVENOUS PRN
Status: DISCONTINUED | OUTPATIENT
Start: 2021-02-26 | End: 2021-02-26 | Stop reason: SDUPTHER

## 2021-02-26 RX ORDER — OXYCODONE HYDROCHLORIDE AND ACETAMINOPHEN 5; 325 MG/1; MG/1
1 TABLET ORAL PRN
Status: DISCONTINUED | OUTPATIENT
Start: 2021-02-26 | End: 2021-02-26 | Stop reason: HOSPADM

## 2021-02-26 RX ORDER — FENTANYL CITRATE 50 UG/ML
50 INJECTION, SOLUTION INTRAMUSCULAR; INTRAVENOUS EVERY 5 MIN PRN
Status: DISCONTINUED | OUTPATIENT
Start: 2021-02-26 | End: 2021-02-26 | Stop reason: HOSPADM

## 2021-02-26 RX ORDER — DIPHENHYDRAMINE HYDROCHLORIDE 50 MG/ML
12.5 INJECTION INTRAMUSCULAR; INTRAVENOUS
Status: DISCONTINUED | OUTPATIENT
Start: 2021-02-26 | End: 2021-02-26 | Stop reason: HOSPADM

## 2021-02-26 RX ORDER — SODIUM CHLORIDE, SODIUM LACTATE, POTASSIUM CHLORIDE, CALCIUM CHLORIDE 600; 310; 30; 20 MG/100ML; MG/100ML; MG/100ML; MG/100ML
INJECTION, SOLUTION INTRAVENOUS CONTINUOUS
Status: DISCONTINUED | OUTPATIENT
Start: 2021-02-26 | End: 2021-02-26 | Stop reason: HOSPADM

## 2021-02-26 RX ORDER — HYDROCODONE BITARTRATE AND ACETAMINOPHEN 5; 325 MG/1; MG/1
1 TABLET ORAL EVERY 6 HOURS PRN
Qty: 10 TABLET | Refills: 0 | Status: SHIPPED | OUTPATIENT
Start: 2021-02-26 | End: 2021-03-01

## 2021-02-26 RX ORDER — PROPOFOL 10 MG/ML
INJECTION, EMULSION INTRAVENOUS CONTINUOUS PRN
Status: DISCONTINUED | OUTPATIENT
Start: 2021-02-26 | End: 2021-02-26 | Stop reason: SDUPTHER

## 2021-02-26 RX ORDER — CLINDAMYCIN PHOSPHATE 900 MG/50ML
900 INJECTION INTRAVENOUS ONCE
Status: COMPLETED | OUTPATIENT
Start: 2021-02-26 | End: 2021-02-26

## 2021-02-26 RX ORDER — MIDAZOLAM HYDROCHLORIDE 1 MG/ML
INJECTION INTRAMUSCULAR; INTRAVENOUS PRN
Status: DISCONTINUED | OUTPATIENT
Start: 2021-02-26 | End: 2021-02-26 | Stop reason: SDUPTHER

## 2021-02-26 RX ADMIN — MIDAZOLAM HYDROCHLORIDE 1 MG: 2 INJECTION, SOLUTION INTRAMUSCULAR; INTRAVENOUS at 08:01

## 2021-02-26 RX ADMIN — FENTANYL CITRATE 25 MCG: 50 INJECTION INTRAMUSCULAR; INTRAVENOUS at 09:28

## 2021-02-26 RX ADMIN — FENTANYL CITRATE 25 MCG: 50 INJECTION, SOLUTION INTRAMUSCULAR; INTRAVENOUS at 07:44

## 2021-02-26 RX ADMIN — KETOROLAC TROMETHAMINE 30 MG: 30 INJECTION, SOLUTION INTRAMUSCULAR at 10:09

## 2021-02-26 RX ADMIN — LIDOCAINE HYDROCHLORIDE 100 MG: 20 INJECTION, SOLUTION INTRAVENOUS at 07:44

## 2021-02-26 RX ADMIN — PROPOFOL 50 MG: 10 INJECTION, EMULSION INTRAVENOUS at 07:46

## 2021-02-26 RX ADMIN — HYDROMORPHONE HYDROCHLORIDE 0.5 MG: 1 INJECTION, SOLUTION INTRAMUSCULAR; INTRAVENOUS; SUBCUTANEOUS at 09:47

## 2021-02-26 RX ADMIN — CLINDAMYCIN PHOSPHATE 900 MG: 18 INJECTION, SOLUTION INTRAVENOUS at 07:47

## 2021-02-26 RX ADMIN — PROPOFOL 100 MCG/KG/MIN: 10 INJECTION, EMULSION INTRAVENOUS at 07:44

## 2021-02-26 RX ADMIN — ONDANSETRON 4 MG: 2 INJECTION INTRAMUSCULAR; INTRAVENOUS at 07:50

## 2021-02-26 RX ADMIN — FENTANYL CITRATE 25 MCG: 50 INJECTION, SOLUTION INTRAMUSCULAR; INTRAVENOUS at 07:52

## 2021-02-26 RX ADMIN — SODIUM CHLORIDE, POTASSIUM CHLORIDE, SODIUM LACTATE AND CALCIUM CHLORIDE: 600; 310; 30; 20 INJECTION, SOLUTION INTRAVENOUS at 06:34

## 2021-02-26 RX ADMIN — MIDAZOLAM HYDROCHLORIDE 2 MG: 2 INJECTION, SOLUTION INTRAMUSCULAR; INTRAVENOUS at 07:38

## 2021-02-26 RX ADMIN — FENTANYL CITRATE 25 MCG: 50 INJECTION, SOLUTION INTRAMUSCULAR; INTRAVENOUS at 08:03

## 2021-02-26 RX ADMIN — ONDANSETRON 4 MG: 2 INJECTION INTRAMUSCULAR; INTRAVENOUS at 09:21

## 2021-02-26 ASSESSMENT — PULMONARY FUNCTION TESTS
PIF_VALUE: 1
PIF_VALUE: 0
PIF_VALUE: 1

## 2021-02-26 ASSESSMENT — PAIN DESCRIPTION - PAIN TYPE
TYPE: SURGICAL PAIN

## 2021-02-26 ASSESSMENT — PAIN DESCRIPTION - ORIENTATION
ORIENTATION: RIGHT
ORIENTATION: RIGHT

## 2021-02-26 ASSESSMENT — PAIN SCALES - GENERAL: PAINLEVEL_OUTOF10: 6

## 2021-02-26 ASSESSMENT — PAIN DESCRIPTION - LOCATION: LOCATION: HAND

## 2021-02-26 ASSESSMENT — PAIN DESCRIPTION - DESCRIPTORS: DESCRIPTORS: ACHING

## 2021-02-26 ASSESSMENT — PAIN - FUNCTIONAL ASSESSMENT: PAIN_FUNCTIONAL_ASSESSMENT: 0-10

## 2021-02-26 NOTE — PROGRESS NOTES
PACU Transfer to [facility]    Vitals:    02/26/21 1030   BP: 111/84   Pulse: 74   Resp: 14   Temp: 97.5 °F (36.4 °C)   SpO2: 99%         Intake/Output Summary (Last 24 hours) at 2/26/2021 1038  Last data filed at 2/26/2021 1037  Gross per 24 hour   Intake 901 ml   Output --   Net 901 ml       Pain assessment:   Pain Level: 5 pt does not want vicodin at this time    Patient transferred to care of HORTENSIA RN.    2/26/2021 10:38 AM

## 2021-02-26 NOTE — BRIEF OP NOTE
Brief Postoperative Note      Patient: Patrick Severe  YOB: 1990  MRN: [id number]    Date of Procedure: 2/26/2021    Pre-Op Diagnosis: Surya Waggoner Quervain's tenosynovitis [M65.4] right wrist    Post-Op Diagnosis: Same       Procedure(s):  RIGHT WRIST DEQUERVAINS RELEASE    Surgeon(s):  Delfino Palma MD    Assistant:  Surgical Assistant: Milli Maldonado    Anesthesia: Monitor Anesthesia Care, local    Estimated Blood Loss (mL): less than 5ml    Complications: None immediate apparent    Specimens:   none  Implants:  none    Drains: none    Findings: see fully dictated operative report    Electronically signed by Alana Garay MD on 2/26/2021 at 8:33 AM

## 2021-02-26 NOTE — H&P
Melania Manolo    [id number]    [facility] Same Day Surgery Update H & P  Department of General Surgery   Surgical Service   Pre-operative History and Physical  Last H & P within the last 30 days. DIAGNOSIS:   De Quervain's tenosynovitis [M65.4]    Procedure(s):  RIGHT WRIST DEQUERVAINS RELEASE     HISTORY OF PRESENT ILLNESS:   Patient with right thumb/wrist pain. Her pain has been unrelieved with medical therapy and the patient presents today for the above procedure. Covid 19:  Patient denies fever, chills, cough or known exposure to Covid-19.        Past Medical History:        Diagnosis Date    Anxiety     Bulimia     Depression     Fibromyalgia syndrome     Gestational hypertension, antepartum     Lupus ([facility] [address]     just diagnosised with lupus last week    Seizures ([facility] [address]      Past Surgical History:        Procedure Laterality Date    BLADDER SURGERY  2017    CARPAL TUNNEL RELEASE Bilateral 2020    LYMPHADENECTOMY  2012    TONSILLECTOMY  2013     Past Social History:  Social History     Socioeconomic History    Marital status: Single     Spouse name: None    Number of children: None    Years of education: None    Highest education level: None   Occupational History    None   Social Needs    Financial resource strain: Not hard at all   Tommie-Pedro Pablo insecurity     Worry: Never true     Inability: Never true    Transportation needs     Medical: No     Non-medical: No   Tobacco Use    Smoking status: Former Smoker     Packs/day: 0.20     Types: Cigarettes     Start date:      Quit date: 2017     Years since quittin.1    Smokeless tobacco: Former User   Substance and Sexual Activity    Alcohol use: No    Drug use: Not Currently    Sexual activity: None   Lifestyle    Physical activity     Days per week: None     Minutes per session: None    Stress: None   Relationships    Social connections     Talks on phone: None     Gets together: None     Attends Quaker service: None Active member of club or organization: None     Attends meetings of clubs or organizations: None     Relationship status: None    Intimate partner violence     Fear of current or ex partner: None     Emotionally abused: None     Physically abused: None     Forced sexual activity: None   Other Topics Concern    None   Social History Narrative    None         Medications Prior to Admission:      Prior to Admission medications    Medication Sig Start Date End Date Taking? Authorizing Provider   DULoxetine (CYMBALTA) 60 MG extended release capsule Take 1 capsule by mouth daily 2/17/21  Yes Danny Salazar MD   levothyroxine (SYNTHROID) 25 MCG tablet Take 1 tablet by mouth daily 1/26/21  Yes Danny Salazar MD   Albuterol Sulfate (PROAIR HFA IN) Inhale 1 puff into the lungs every 4 hours as needed   Yes Historical Provider, MD   hydrOXYzine (ATARAX) 50 MG tablet Take 50 mg by mouth 3 times daily as needed for Anxiety    Yes Historical Provider, MD   traZODone (DESYREL) 50 MG tablet Take 50 mg by mouth nightly    Yes Historical Provider, MD   Rimegepant Sulfate (NURTEC) 75 MG TBDP Take by mouth    Historical Provider, MD   mometasone (ELOCON) 0.1 % cream Apply topically daily. 1/5/21   Danny Salazar MD   belimumab (BENLYSTA) 120 MG injection Infuse 120 mg intravenously once Every two weeks, then monthly. Historical Provider, MD         Allergies:  Pcn [penicillins]    PHYSICAL EXAM:      /82   Pulse 82   Temp 97.9 °F (36.6 °C) (Oral)   Resp 18   Ht 5' 6\" (1.676 m)   Wt 140 lb (63.5 kg)   SpO2 95%   BMI 22.60 kg/m²      Airway:  Airway patent with no audible stridor    Heart:  regular rate and rhythm, No murmur noted    Lungs:  No increased work of breathing, good air exchange, clear to auscultation bilaterally, no crackles or wheezing    Abdomen:  soft, non-distended, non-tender, no rebound tenderness or guarding, normal active bowel sounds and no masses palpated    ASSESSMENT AND PLAN     Patient is a 35 y.o. female with above specified procedure planned. 1.  The patients history and physical was obtained and signed off by the pre-admission testing department. Patient seen and focused exam done today- no new changes since last physical exam on 2/17/21    2. Access to ancillary services are available per request of the provider.     YOVANNY Cordova - KARINA     2/26/2021

## 2021-02-26 NOTE — ANESTHESIA POSTPROCEDURE EVALUATION
Department of Anesthesiology  Postprocedure Note    Patient: Rajeev Brown  MRN: [id number]  YOB: 1990  Date of evaluation: 2/26/2021  Time:  10:38 AM     Procedure Summary     Date: 02/26/21 Room / Location: [facility]    Anesthesia Start: 8333 Anesthesia Stop: 0736    Procedure: RIGHT WRIST DEQUERVAINS RELEASE (Right Hand) Diagnosis:       Gabriela Board Quervain's tenosynovitis      (De Quervain's tenosynovitis [M65.4] right wrist)    Surgeons: Malika Roper MD Responsible Provider: Dandre Mathur MD    Anesthesia Type: MAC ASA Status: 3          Anesthesia Type: MAC    Crow Phase I: Crow Score: 9    Crow Phase II:      Last vitals: Reviewed and per EMR flowsheets.        Anesthesia Post Evaluation    Patient location during evaluation: PACU  Patient participation: complete - patient participated  Level of consciousness: awake  Pain score: 2  Airway patency: patent  Nausea & Vomiting: no nausea and no vomiting  Complications: no  Cardiovascular status: hemodynamically stable  Respiratory status: acceptable  Hydration status: euvolemic

## 2021-02-26 NOTE — PROGRESS NOTES
Patients states last seizure was a week ago    Patients contact lenses removed, placed in normal saline and then placed in patient belongings.

## 2021-02-26 NOTE — OP NOTE
Madhuri Delatorre (1990)    Date of Surgery- 2/26/2021    Pre-Op Diagnoses:  1.   right DeQuervain tendonitis  2. Post-op Diagnoses:  1.  same  2. Procedure(s) Performed:  1.  right wrist DeQuervain release  2. Surgeon:  Ahmet Avalos MD    Anesthesia Type:   Local/Mac    Blood Loss:   Less than 5ml    Pathology:   None    Complications:   None immediate apparent    Assistant:  [facility]     The right wrist, site of surgery, was marked in preop holding by Dr Yahir Thomas after discussing the surgical procedure once again with the patient. All questions and concerns were addressed. Informed consent was on the chart. The patient was brought to the operating and room and placed in the supine position. After the induction of anesthesia a standard time-out was performed. Description of the Procedure: The right upper extremity had been prepped and draped in the normal sterile fashion, antibiotics were in place. Following the final timeout, the right upper extremity was exsanguinated and the tourniquet inflated to 250 mmHg. A 3-4 cm longitudinal incision was made over the first dorsal compartment through skin only. Blunt scissor dissection was taken to the subcutaneous tissues, meticulous hemostasis. Branches of the superficial radial nerve were identified and protected, and gently retracted. The first dorsal compartment was identified and isolated. Under direct visualization, the first dorsal compartment was incised with a #15 blade. The first dorsal compartment sheath was quite thickened. The APL tendons were identified, freed of adhesions. The EPB had a separate sub-sheath and this was released under direct visualization carefully. The thumb had excellent excursion without any impedance on tendon motion. No other abnormality was noted. The wound was copiously irrigated. No subluxation of the tendons noted with motion of the thumb.     Skin was closed with inverted Vicryl followed by running Monocryl and Steri-Strips. Soft sterile dressing and thumb spica splint was placed. Tourniquet was deflated and all fingers including the thumb were warm and well perfused. Patient was awoken from sedation, tolerated the case well, and was taken to the post anesthesia recovery unit in good condition. No complications. Findings:         Intervention:  Injection: 1% lidocaine, 0.25% marcaine, no epinephrine    Other Notes: Follow-up in 7-10 days, wound inspection. Clip absorbable suture ends. Gentle range of motion of the thumb and hand with progressive activities. Scar massage. Hand therapy referral if needed.       Param Ramos MD    Hand & Upper Extremity Surgery  [facility]

## 2021-02-26 NOTE — ANESTHESIA PRE PROCEDURE
Department of Anesthesiology  Preprocedure Note       Name:  Carlton Mcallister   Age:  35 y.o.  :  1990                                          MRN:  [id number]         Date:  2021      Surgeon: Bakari Link):  Kennedy Monteiro MD    Procedure: Procedure(s):  RIGHT WRIST DEQUERVAINS RELEASE    Medications prior to admission:   Prior to Admission medications    Medication Sig Start Date End Date Taking? Authorizing Provider   DULoxetine (CYMBALTA) 60 MG extended release capsule Take 1 capsule by mouth daily 21  Yes Jamee Lobato MD   levothyroxine (SYNTHROID) 25 MCG tablet Take 1 tablet by mouth daily 21  Yes Jamee Lobato MD   Albuterol Sulfate (PROAIR HFA IN) Inhale 1 puff into the lungs every 4 hours as needed   Yes Historical Provider, MD   hydrOXYzine (ATARAX) 50 MG tablet Take 50 mg by mouth 3 times daily as needed for Anxiety    Yes Historical Provider, MD   traZODone (DESYREL) 50 MG tablet Take 50 mg by mouth nightly    Yes Historical Provider, MD   Rimegepant Sulfate (NURTEC) 75 MG TBDP Take by mouth    Historical Provider, MD   mometasone (ELOCON) 0.1 % cream Apply topically daily. 21   Jamee Lobato MD   belimumab (BENLYSTA) 120 MG injection Infuse 120 mg intravenously once Every two weeks, then monthly. Historical Provider, MD       Current medications:    Current Facility-Administered Medications   Medication Dose Route Frequency Provider Last Rate Last Admin    clindamycin (CLEOCIN) 900 mg in dextrose 5 % 50 mL IVPB  900 mg Intravenous Once Kennedy Monteiro MD        lactated ringers infusion   Intravenous Continuous Sandhya Guy  mL/hr at 21 0634 New Bag at 21 5779       Allergies:     Allergies   Allergen Reactions    Pcn [Penicillins] Nausea And Vomiting       Problem List:    Patient Active Problem List   Diagnosis Code    Systemic lupus erythematosus (HCC) M32.9    History of antiphospholipid syndrome Z86.2    Migraine with aura and without status migrainosus, not intractable G43. [facility] Quervain's tenosynovitis, bilateral M65.4       Past Medical History:        Diagnosis Date    Anxiety     Bulimia     Depression     Fibromyalgia syndrome     Gestational hypertension, antepartum     Lupus ([facility] [address]     just diagnosised with lupus last week    Seizures ([facility] [address]        Past Surgical History:        Procedure Laterality Date    BLADDER SURGERY  2017    CARPAL TUNNEL RELEASE Bilateral 2020    LYMPHADENECTOMY  2012    TONSILLECTOMY         Social History:    Social History     Tobacco Use    Smoking status: Former Smoker     Packs/day: 0.20     Types: Cigarettes     Start date:      Quit date:      Years since quittin.1    Smokeless tobacco: Former User   Substance Use Topics    Alcohol use: No                                Counseling given: Not Answered      Vital Signs (Current):   Vitals:    21 0554   BP: 126/82   Pulse: 82   Resp: 18   Temp: 97.9 °F (36.6 °C)   TempSrc: Oral   SpO2: 95%   Weight: 140 lb (63.5 kg)   Height: 5' 6\" (1.676 m)                                              BP Readings from Last 3 Encounters:   21 126/82   21 98/62   21 96/60       NPO Status: Time of last liquid consumption:                         Time of last solid consumption:                         Date of last liquid consumption: 21                        Date of last solid food consumption: 21    BMI:   Wt Readings from Last 3 Encounters:   21 140 lb (63.5 kg)   21 144 lb (65.3 kg)   21 148 lb (67.1 kg)     Body mass index is 22.6 kg/m².     CBC:   Lab Results   Component Value Date    WBC 2.1 2021    RBC 4.73 2021    HGB 11.7 2021    HCT 36.6 2021    MCV 77.4 2021    RDW 16.5 2021     2021       CMP:   Lab Results   Component Value Date     2021    K 3.7 2021     2021    CO2 28 2021    BUN 15 2021 CREATININE 0.7 01/22/2021    GFRAA >60 01/22/2021    GFRAA >60 08/31/2011    AGRATIO 1.7 01/22/2021    LABGLOM >60 01/22/2021    GLUCOSE 86 01/22/2021    PROT 6.5 01/22/2021    CALCIUM 9.7 01/22/2021    BILITOT 0.4 01/22/2021    ALKPHOS 79 01/22/2021    AST 47 01/22/2021    ALT 35 01/22/2021       POC Tests: No results for input(s): POCGLU, POCNA, POCK, POCCL, POCBUN, POCHEMO, POCHCT in the last 72 hours. Coags: No results found for: PROTIME, INR, APTT    HCG (If Applicable):   Lab Results   Component Value Date    PREGTESTUR Neg 08/31/2011        ABGs: No results found for: PHART, PO2ART, ESZ7FNY, JHE8PEP, BEART, Q0DBJLYK     Type & Screen (If Applicable):  No results found for: LABABO, LABRH    Drug/Infectious Status (If Applicable):  No results found for: HIV, HEPCAB    COVID-19 Screening (If Applicable):   Lab Results   Component Value Date    COVID19 Not Detected 02/22/2021         Anesthesia Evaluation    Airway: Mallampati: II  TM distance: >3 FB   Neck ROM: full  Mouth opening: < 3 FB Dental:          Pulmonary:   (+) asthma (albuterol with cold and exercise): exercise-induced asthma,                            Cardiovascular:  Exercise tolerance: poor (<4 METS),   (+) hypertension:,     (-)  angina and  STOREY                Neuro/Psych:   (+) seizures:, neuromuscular disease:, headaches:, psychiatric history:            GI/Hepatic/Renal:   (+) GERD:,           Endo/Other:    (+) : arthritis:., .    (-) diabetes mellitus               Abdominal:           Vascular:                                        Anesthesia Plan      MAC     ASA 3     (-npo MN  -h/o fainting spells with stress. H/o seizures related to lupus, last was last week  -denies chest pain or palp. Uses cane to walk)  Induction: intravenous. MIPS: Postoperative opioids intended. Anesthetic plan and risks discussed with patient. Plan discussed with CRNA.                   Susan Son MD   2/26/2021

## 2021-02-26 NOTE — PROGRESS NOTES
Ambulatory Surgery/Procedure Discharge Note    Vitals:    02/26/21 1126   BP: 110/70   Pulse: 81   Resp: 14   Temp: 97.2 °F (36.2 °C)   SpO2: 98%       In: 901 [P.O.:120; I.V.:781]  Out: -     Restroom use offered before discharge. yes    Pain assessment:  Pain level acceptable   Pain Level: 2        Patient discharged to home/self care.  Patient discharged via wheel chair by transporter to waiting family/S.O.       2/26/2021 11:37 AM

## 2021-02-26 NOTE — PROGRESS NOTES
Pt arrived from OR, s/p RIGHT WRIST [address] [place] [address] - Right, report received form CRNA, pt sedate on arrival, stable

## 2021-02-26 NOTE — H&P
I have reviewed the history and physical and examined the patient and find no relevant changes. I have reviewed with the patient and/or family the risks, benefits, and alternatives to the procedure.     Moo Fernando MD  2/26/2021

## 2021-03-02 ENCOUNTER — OFFICE VISIT (OUTPATIENT)
Dept: RHEUMATOLOGY | Age: 31
End: 2021-03-02
Payer: MEDICARE

## 2021-03-02 VITALS — TEMPERATURE: 97.4 F | BODY MASS INDEX: 22.5 KG/M2 | HEIGHT: 66 IN | WEIGHT: 140 LBS

## 2021-03-02 DIAGNOSIS — Z79.899 HIGH RISK MEDICATIONS (NOT ANTICOAGULANTS) LONG-TERM USE: ICD-10-CM

## 2021-03-02 DIAGNOSIS — M32.9 SYSTEMIC LUPUS ERYTHEMATOSUS, UNSPECIFIED SLE TYPE, UNSPECIFIED ORGAN INVOLVEMENT STATUS (HCC): Primary | ICD-10-CM

## 2021-03-02 DIAGNOSIS — M79.7 FIBROMYALGIA: ICD-10-CM

## 2021-03-02 PROCEDURE — G8484 FLU IMMUNIZE NO ADMIN: HCPCS | Performed by: INTERNAL MEDICINE

## 2021-03-02 PROCEDURE — G8420 CALC BMI NORM PARAMETERS: HCPCS | Performed by: INTERNAL MEDICINE

## 2021-03-02 PROCEDURE — 99214 OFFICE O/P EST MOD 30 MIN: CPT | Performed by: INTERNAL MEDICINE

## 2021-03-02 PROCEDURE — 1036F TOBACCO NON-USER: CPT | Performed by: INTERNAL MEDICINE

## 2021-03-02 PROCEDURE — G8427 DOCREV CUR MEDS BY ELIG CLIN: HCPCS | Performed by: INTERNAL MEDICINE

## 2021-03-02 RX ORDER — PREGABALIN 25 MG/1
CAPSULE ORAL
Qty: 60 CAPSULE | Refills: 1 | Status: SHIPPED | OUTPATIENT
Start: 2021-03-02 | End: 2021-06-30

## 2021-03-02 NOTE — PROGRESS NOTES
[address], [place]                                                           [address] [place] [address], [address] (p) [phone] (F)      Dear Dr. Jamee Lobato MD:  Please find Rheumatology assessment. Thank you for giving me the opportunity to be involved in Erich Mcbride's care and I look forward following Erich Ricks along with you. If you have any questions or concerns please feel free to reach me. Note is transcribed using voice recognition software. Inadvertent computerized transcription errors may be present. Patient identification: Erich Mcbride,: 1990,30 y.o. Sex: female     [address] was seen today for follow-up. Diagnoses and all orders for this visit:    Systemic lupus erythematosus, unspecified SLE type, unspecified organ involvement status (HCC)    Fibromyalgia  -     pregabalin (LYRICA) 25 MG capsule; Take 1 tab po BID    High risk medications (not anticoagulants) long-term use         Background-   SLE-diagnosis -based on arthritis, photosensitivity, photosensitive rash, patchy alopecia, possible pleuritis. Abnormal labs including positive IRIS, double-stranded DNA, SSA, depressed C3-C4. Also has noninflammatory discomfort from fibromyalgia causing generalized musculoskeletal pain. Tried several medications-most of them for short duration-Imuran, methotrexate-leukopenia, CellCept-some kind of blood abnormalities, hydroxychloroquine-abnormal eye exam per patient, has been maintained on Benlysta IV therapy for about 5 years, last infusion was in 2020. She does not notice any change in her symptoms. She is on Cymbalta for fibromyalgia.   Is on disability because of PTSD and lupus.    Today-  Lupus-active, arthralgias, inflammatory arthritis in her finger joints, leukopenia. Fibromyalgia active. Raynaud phenomena-persistent, no tissue loss. Is on amlodipine. Plan-  She will come this Thursday for Benlysta infusion. Does not feel comfortable going back on hydroxychloroquine-prolonged use more than 10 years. Concerns for ocular toxicity. Add Lyrica for fibromyalgia, side effects and directions explained. Patient indicates understanding and agrees with the management plan. I reviewed patient's history, referral documents and electronic medical records. Outside rheumatology records are scanned and reviewed as well as information from care everywhere is reviewed including rheumatology note, labs. #######################################################################      Subjective-  She has been experiencing generalized musculoskeletal discomfort and pain all over. Finger joints are achy, sore, stiff and swollen. Underwent right de Quervain's tendon release last Friday, is on cast right now. No active photosensitivity, rashes, alopecia, mucositis, pleurisy, GI/ symptoms. Labs below-leukopenia, lymphopenia, depressed complements. Normal urine protein creatinine ratio. All other ROS are negative. PMH, PSH,Social history , Meds reviewed. PHYSICAL EXAM:    Vitals:    Temp 97.4 °F (36.3 °C) (Skin)   Ht 5' 6\" (1.676 m)   Wt 140 lb (63.5 kg)   BMI 22.60 kg/m²   AA)x3 well nourished, and well groomed, normal judgement. MKS: All of myofascial tender points are positive. Tenderness, synovitis noted across the PIPs, MCPs bilaterally. Is able to make full fist but uncomfortable. All other joints in upper and lower extremities are nontender, no swelling or synovitis. Skin: No rashes, no induration or skin thickening or nodules. HEENT: Normal lids, lacrimal glands and pupils. No oral or nasal ulcers. Salivary glands reveal no evidence of abnormality.  External inspection of the ears and nose within normal limits. Neck: No adenopathy or thyroid enlargement. Chest: Normal effort, CTA bilaterally, S1-S2 regular. DATA:   Lab Results   Component Value Date    WBC 2.1 (L) 01/22/2021    HGB 11.7 (L) 01/22/2021    HCT 36.6 01/22/2021    MCV 77.4 (L) 01/22/2021     01/22/2021         Chemistry        Component Value Date/Time     01/22/2021 0854    K 3.7 01/22/2021 0854     01/22/2021 0854    CO2 28 01/22/2021 0854    BUN 15 01/22/2021 0854    CREATININE 0.7 01/22/2021 0854        Component Value Date/Time    CALCIUM 9.7 01/22/2021 0854    ALKPHOS 79 01/22/2021 0854    AST 47 (H) 01/22/2021 0854    ALT 35 01/22/2021 0854    BILITOT 0.4 01/22/2021 0854          No results found for: [facility]  Lab Results   Component Value Date    CRP 0.4 01/22/2021     Lab Results   Component Value Date    IRIS POSITIVE (A) 01/22/2021    SEDRATE 6 01/22/2021     No results found for: CKTOTAL  No results found for: TSH    C4 9      Radiology Review:     A/P- See above.

## 2021-03-03 ENCOUNTER — VIRTUAL VISIT (OUTPATIENT)
Dept: INTERNAL MEDICINE CLINIC | Age: 31
End: 2021-03-03
Payer: MEDICARE

## 2021-03-03 DIAGNOSIS — Z00.00 ROUTINE GENERAL MEDICAL EXAMINATION AT A HEALTH CARE FACILITY: Primary | ICD-10-CM

## 2021-03-03 DIAGNOSIS — G40.909 SEIZURE DISORDER (HCC): ICD-10-CM

## 2021-03-03 DIAGNOSIS — G95.9 CERVICAL MYELOPATHY (HCC): ICD-10-CM

## 2021-03-03 PROCEDURE — G8484 FLU IMMUNIZE NO ADMIN: HCPCS | Performed by: INTERNAL MEDICINE

## 2021-03-03 PROCEDURE — G0402 INITIAL PREVENTIVE EXAM: HCPCS | Performed by: INTERNAL MEDICINE

## 2021-03-03 ASSESSMENT — PATIENT HEALTH QUESTIONNAIRE - PHQ9
SUM OF ALL RESPONSES TO PHQ9 QUESTIONS 1 & 2: 0
2. FEELING DOWN, DEPRESSED OR HOPELESS: 0
SUM OF ALL RESPONSES TO PHQ QUESTIONS 1-9: 0
1. LITTLE INTEREST OR PLEASURE IN DOING THINGS: 0

## 2021-03-03 NOTE — PROGRESS NOTES
Medicare Annual Wellness Visit  Name: Siobhan Stewart Date: 3/3/2021   MRN: [id number] Sex: Female   Age: 35 y.o. Ethnicity: Non-/Non    : 1990 Race: Tu Sleeper is here for Medicare AWV (wellness)    Screenings for behavioral, psychosocial and functional/safety risks, and cognitive dysfunction are all negative except as indicated below. These results, as well as other patient data from the [address] form, are documented in Flowsheets linked to this Encounter. Allergies   Allergen Reactions    Pcn [Penicillins] Nausea And Vomiting       Prior to Visit Medications    Medication Sig Taking? Authorizing Provider   ondansetron (ZOFRAN) 4 MG tablet Take 1 tablet by mouth every 8 hours as needed for Nausea Yes Frankey Beau, MD   DULoxetine (CYMBALTA) 60 MG extended release capsule Take 1 capsule by mouth daily Yes Bhupinder Donovan MD   levothyroxine (SYNTHROID) 25 MCG tablet Take 1 tablet by mouth daily Yes Bhupinder Donovan MD   Rimegepant Sulfate (NURTEC) 75 MG TBDP Take by mouth Yes Historical Provider, MD   Albuterol Sulfate (PROAIR HFA IN) Inhale 1 puff into the lungs every 4 hours as needed Yes Historical Provider, MD   hydrOXYzine (ATARAX) 50 MG tablet Take 50 mg by mouth 3 times daily as needed for Anxiety  Yes Historical Provider, MD   mometasone (ELOCON) 0.1 % cream Apply topically daily. Yes Bhupinder Donovan MD   belimumab (BENLYSTA) 120 MG injection Infuse 120 mg intravenously once Every two weeks, then monthly.  Yes Historical Provider, MD   traZODone (DESYREL) 50 MG tablet Take 50 mg by mouth nightly  Yes Historical Provider, MD   pregabalin (LYRICA) 25 MG capsule Take 1 tab po BID  Rene Terrell MD       Past Medical History:   Diagnosis Date    Anxiety     Bulimia     Depression     Fibromyalgia syndrome     Gestational hypertension, antepartum     Lupus [facility] [address]     just diagnosised with lupus last week    Seizures ([facility] [address]        Past Surgical History: Procedure Laterality Date    BLADDER SURGERY  2017    CARPAL TUNNEL RELEASE Bilateral 2020    LYMPHADENECTOMY  2012    TONSILLECTOMY  2013    WRIST SURGERY Right 2/26/2021    RIGHT WRIST DEQUERVAINS RELEASE performed by Ana Renteria MD at [address] History   Problem Relation Age of Onset    Hypertension Mother     Thyroid Disease Mother     Depression Father     Depression Sister     Anxiety Disorder Sister     Migraines Sister     Glaucoma Maternal Grandmother     Heart Attack Maternal Grandfather     Heart Disease Maternal Grandfather     Cancer Paternal Grandmother     Cancer Paternal Grandfather        CareTeam (Including outside providers/suppliers regularly involved in providing care):   Patient Care Team:  Christian Garrison MD as PCP - General (Internal Medicine)  Christian Garrison MD as PCP - [facility] Empaneled Provider    Wt Readings from Last 3 Encounters:   03/02/21 140 lb (63.5 kg)   02/26/21 140 lb (63.5 kg)   02/17/21 144 lb (65.3 kg)     Patient-Reported Vitals 3/3/2021   Patient-Reported Weight 140lb   Patient-Reported Systolic 277   Patient-Reported Diastolic 72   Patient-Reported Pulse 120      There is no height or weight on file to calculate BMI. Based upon direct observation of the patient, evaluation of cognition reveals recent and remote memory intact. Patient's complete Health Risk Assessment and screening values have been reviewed and are found in Flowsheets. The following problems were reviewed today and where indicated follow up appointments were made and/or referrals ordered.     Positive Risk Factor Screenings with Interventions:     Fall Risk:  2 or more falls in past year?: (!) yes(2 falls per week.)  Fall with injury in past year?: no  Fall Risk Interventions:    · lupus symptoms related        General Health and ACP:  General  In general, how would you say your health is?: (!) Poor  Do you get the social and emotional support that you need?: Yes Recommendations for Preventive Services Due: see orders and patient instructions/AVS.  . Recommended screening schedule for the next 5-10 years is provided to the patient in written form: see Patient Eloisa Dee was seen today for medicare awv. Diagnoses and all orders for this visit:    Routine general medical examination at a health care facility               Lidya Ingram is a 35 y.o. female being evaluated by a Virtual Visit (video and audio) encounter to address concerns as mentioned above. A caregiver was present when appropriate. Due to this being a TeleHealth encounter (During TSQ-18 public health emergency), evaluation of the following organ systems was limited: Vitals/Constitutional/EENT/Resp/CV/GI//MS/Neuro/Skin/Heme-Lymph-Imm. Pursuant to the emergency declaration under the [address] [place] [address], [address] [place] [address] authority and the [employer] Act, this Virtual Visit was conducted with patient's (and/or legal guardian's) consent, to reduce the patient's risk of exposure to COVID-19 and provide necessary medical care. The patient (and/or legal guardian) has also been advised to contact this office for worsening conditions or problems, and seek emergency medical treatment and/or call 911 if deemed necessary. Patient identification was verified at the start of the visit: Yes    Services were provided through a video synchronous discussion virtually to substitute for in-person clinic visit. Patient and provider were located at their individual homes. --Christian Garrison MD on 3/3/2021 at 1:34 PM    An electronic signature was used to authenticate this note.

## 2021-03-03 NOTE — PATIENT INSTRUCTIONS
Personalized Preventive Plan for Magi Gutierrez - 3/3/2021  Medicare offers a range of preventive health benefits. Some of the tests and screenings are paid in full while other may be subject to a deductible, co-insurance, and/or copay. Some of these benefits include a comprehensive review of your medical history including lifestyle, illnesses that may run in your family, and various assessments and screenings as appropriate. After reviewing your medical record and screening and assessments performed today your provider may have ordered immunizations, labs, imaging, and/or referrals for you. A list of these orders (if applicable) as well as your Preventive Care list are included within your After Visit Summary for your review. Other Preventive Recommendations:    · A preventive eye exam performed by an eye specialist is recommended every 1-2 years to screen for glaucoma; cataracts, macular degeneration, and other eye disorders. · A preventive dental visit is recommended every 6 months. · Try to get at least 150 minutes of exercise per week or 10,000 steps per day on a pedometer . · Order or download the FREE \"Exercise & Physical Activity: Your Everyday Guide\" from [employer]. Call 3-574.507.4316 or search [employer] online. · You need 8380-9481 mg of calcium and 9631-8816 IU of vitamin D per day. It is possible to meet your calcium requirement with diet alone, but a vitamin D supplement is usually necessary to meet this goal.  · When exposed to the sun, use a sunscreen that protects against both UVA and UVB radiation with an SPF of 30 or greater. Reapply every 2 to 3 hours or after sweating, drying off with a towel, or swimming. · Always wear a seat belt when traveling in a car. Always wear a helmet when riding a bicycle or motorcycle.

## 2021-03-04 ENCOUNTER — NURSE ONLY (OUTPATIENT)
Dept: RHEUMATOLOGY | Age: 31
End: 2021-03-04
Payer: MEDICARE

## 2021-03-04 VITALS
RESPIRATION RATE: 16 BRPM | TEMPERATURE: 98.2 F | DIASTOLIC BLOOD PRESSURE: 68 MMHG | BODY MASS INDEX: 22.95 KG/M2 | WEIGHT: 142.2 LBS | HEART RATE: 90 BPM | SYSTOLIC BLOOD PRESSURE: 116 MMHG

## 2021-03-04 DIAGNOSIS — M32.9 SYSTEMIC LUPUS ERYTHEMATOSUS, UNSPECIFIED SLE TYPE, UNSPECIFIED ORGAN INVOLVEMENT STATUS (HCC): ICD-10-CM

## 2021-03-04 PROCEDURE — 96413 CHEMO IV INFUSION 1 HR: CPT | Performed by: INTERNAL MEDICINE

## 2021-03-04 RX ORDER — TRAZODONE HYDROCHLORIDE 50 MG/1
50 TABLET ORAL NIGHTLY
Qty: 90 TABLET | Refills: 1 | Status: SHIPPED | OUTPATIENT
Start: 2021-03-04 | End: 2021-10-01 | Stop reason: SDUPTHER

## 2021-03-04 RX ORDER — LEVOTHYROXINE SODIUM 0.03 MG/1
TABLET ORAL
Qty: 90 TABLET | Refills: 1 | Status: SHIPPED | OUTPATIENT
Start: 2021-03-04 | End: 2021-03-11 | Stop reason: SDUPTHER

## 2021-03-04 RX ORDER — AMLODIPINE BESYLATE 5 MG/1
TABLET ORAL
Qty: 90 TABLET | Refills: 1 | Status: SHIPPED | OUTPATIENT
Start: 2021-03-04 | End: 2021-03-09 | Stop reason: ALTCHOICE

## 2021-03-04 NOTE — PROGRESS NOTES
Pt here for Benlysta infusion, no follow up visit or labs today. Restarting treatment-see office note from 3/2/2021. Today 142.2 lbs =64.6 kg   X 10 mg/kg = 646 mg   Rounded to 720 mg. Infusion  tolerated well. Seen by Dr Deboraha Severance 3/2/2021. Surgical procedure on left arm last Friday. Received clearance for treatment today. Next visit scheduled  in 2 weeks.   IV Gauge: #22  IV Site: left antecubital  # of Attempts: 1  IV Start: 0845 am  IV Stop: 0948 am      IV Volume:250 ml Normal Saline Solution  IV Bag Lot# [id number]  IV Bag EXP: 6/1/2022

## 2021-03-09 ENCOUNTER — OFFICE VISIT (OUTPATIENT)
Dept: ENDOCRINOLOGY | Age: 31
End: 2021-03-09
Payer: MEDICARE

## 2021-03-09 VITALS
RESPIRATION RATE: 18 BRPM | DIASTOLIC BLOOD PRESSURE: 78 MMHG | WEIGHT: 141.6 LBS | HEART RATE: 117 BPM | OXYGEN SATURATION: 95 % | BODY MASS INDEX: 22.76 KG/M2 | SYSTOLIC BLOOD PRESSURE: 110 MMHG | HEIGHT: 66 IN

## 2021-03-09 DIAGNOSIS — R79.89 ELEVATED PROLACTIN LEVEL: ICD-10-CM

## 2021-03-09 DIAGNOSIS — N92.6 IRREGULAR PERIODS/MENSTRUAL CYCLES: ICD-10-CM

## 2021-03-09 DIAGNOSIS — E03.9 ACQUIRED HYPOTHYROIDISM: Primary | ICD-10-CM

## 2021-03-09 PROCEDURE — G8484 FLU IMMUNIZE NO ADMIN: HCPCS | Performed by: INTERNAL MEDICINE

## 2021-03-09 PROCEDURE — G8427 DOCREV CUR MEDS BY ELIG CLIN: HCPCS | Performed by: INTERNAL MEDICINE

## 2021-03-09 PROCEDURE — G8420 CALC BMI NORM PARAMETERS: HCPCS | Performed by: INTERNAL MEDICINE

## 2021-03-09 PROCEDURE — 99204 OFFICE O/P NEW MOD 45 MIN: CPT | Performed by: INTERNAL MEDICINE

## 2021-03-09 PROCEDURE — 1036F TOBACCO NON-USER: CPT | Performed by: INTERNAL MEDICINE

## 2021-03-09 NOTE — PROGRESS NOTES
Subjective:      34 y/o WF who is here for thyroid evaluation.      She was diagnosed with hypothyroidism    Labs:    1/22 showed TSH 0.03 FT4 0.7    4/17 TSH 1.9  3/11 TSH 1.68    Started levothyroxine 25mcg daily by PCP    Current complaints: sleep issues, no cycle since Oct    Mild, controlled  No worsening factors    No improvement with levothyroxine  No relieving factors    History of radiation to patient's neck:  no  Family history includes mom thyroid issues  Family history of thyroid cancer: no  2/17 MRI brain no pituitary abnormality mentioned  2018_ Thyroid [facility] normal    1/21 prolactin was elevated  27.1    Repeat normal 2/21    She had low DHEA-S 39.5 on 221  Testosterone 0.6 Nl    Reports no cycles since Oct  Has seen Gyn    2/21 Estradiol 26  FSH 5.8 LH 5.5    No h/o infertility  One live birth, h/o miscarriages  No hirsutism, acne    She has SLE    She has a head concussion in the past    SH: Lives with wife  She has one child    Past Medical History:   Diagnosis Date    Anxiety     Bulimia     Depression     Fibromyalgia syndrome     Gestational hypertension, antepartum     Lupus ([address]     just diagnosised with lupus last week    Seizures ([address]      Past Surgical History:   Procedure Laterality Date    BLADDER SURGERY  2017    CARPAL TUNNEL RELEASE Bilateral 2020    LYMPHADENECTOMY  2012    TONSILLECTOMY  2013    WRIST SURGERY Right 2/26/2021    RIGHT WRIST DEQUERVAINS RELEASE performed by Frankey Beau, MD at [address]     Current Outpatient Medications   Medication Sig Dispense Refill    traZODone (DESYREL) 50 MG tablet Take 1 tablet by mouth nightly 90 tablet 1    levothyroxine (SYNTHROID) 25 MCG tablet TAKE 1 TABLET BY MOUTH EVERY DAY 90 tablet 1    amLODIPine (NORVASC) 5 MG tablet TAKE 1 TABLET BY MOUTH EVERY DAY 90 tablet 1    pregabalin (LYRICA) 25 MG capsule Take 1 tab po BID 60 capsule 1    ondansetron (ZOFRAN) 4 MG tablet Take 1 tablet by mouth every 8 hours as needed for Nausea 12 tablet 0    DULoxetine (CYMBALTA) 60 MG extended release capsule Take 1 capsule by mouth daily 90 capsule 1    Rimegepant Sulfate (NURTEC) 75 MG TBDP Take by mouth      Albuterol Sulfate (PROAIR HFA IN) Inhale 1 puff into the lungs every 4 hours as needed      hydrOXYzine (ATARAX) 50 MG tablet Take 50 mg by mouth 3 times daily as needed for Anxiety       mometasone (ELOCON) 0.1 % cream Apply topically daily. 15 g 2    belimumab (BENLYSTA) 120 MG injection Infuse 120 mg intravenously once Every two weeks, then monthly. Current Facility-Administered Medications   Medication Dose Route Frequency Provider Last Rate Last Admin    belimumab (BENLYSTA) injection 120 mg  120 mg Intravenous Once Andrew Lopez MD           Review of Systems  Please see scanned     Objective: There were no vitals taken for this visit. Wt Readings from Last 3 Encounters:   03/04/21 142 lb 3.2 oz (64.5 kg)   03/02/21 140 lb (63.5 kg)   02/26/21 140 lb (63.5 kg)     Vitals:    03/09/21 1203   BP: 110/78   Pulse: 117   Resp: 18   SpO2: 95%       Constitutional: Well-developed, appears stated age, cooperative, in no acute distress  H/E/N/M/T:atraumatic, normocephalic, external ears, nose, lips normal without lesions  No facial puffiness, no hoarseness    Eyes: Lids, lashes, conjunctivae and sclerae normal, No proptosis, no lid lag  Neck: supple, symmetrical, trachea midline   Thyroid: gland size normal, non-tender on palpation  Respiratory: clear to auscultation bilaterally and breathing is unlabored  Cardiovascular: regular rate and rhythm, S1, S2, regular rate =  Skin: No obvious rashes or lesions present.   Skin and hair texture normal  Psychiatric: Judgement and Insight:  judgement and insight appear normal  Neuro: Normal without focal findings, speech is normal normal, speech is spontaneous, and movements are coordinated, alert and oriented x3    Lab Review  No results found for: TSH  No results found for: Sameera Ramirez Assessment: 1. Hypothyroidism: TSH low with low FT4 suggestive of central hypothyroidism or recovering thyroiditis. Will repeat levels with T3. Assess if need to continue levothyroxine. She had brain imaging recently , will get records. 2.Elevated prolactin: Repeat normal  3. SLE  4. Irregular Cycles: Testosterone normal, DHEA-S low, not suggestive of PCOS. Repeat prolactin normal. No improvement with levothyroxine. FSH, LH normal. Estradiol is low normal. Will get records of brain imaging. Recommend to f/u with Gyn    Plan: 1. Order TSH, FT4 and FT3  2. ACTH and cortisol level  3. IGF-1  4. Furthur treatment based on results.

## 2021-03-10 DIAGNOSIS — R79.89 ELEVATED PROLACTIN LEVEL: ICD-10-CM

## 2021-03-10 DIAGNOSIS — E03.9 ACQUIRED HYPOTHYROIDISM: ICD-10-CM

## 2021-03-10 LAB
CORTISOL - AM: 7.2 UG/DL (ref 4.3–22.4)
T3 FREE: 3 PG/ML (ref 2.3–4.2)
T4 FREE: 0.6 NG/DL (ref 0.9–1.8)
TSH REFLEX: 4.13 UIU/ML (ref 0.27–4.2)

## 2021-03-11 LAB — ADRENOCORTICOTROPIC HORMONE: 14 PG/ML (ref 6–58)

## 2021-03-11 RX ORDER — LEVOTHYROXINE SODIUM 0.05 MG/1
TABLET ORAL
Qty: 30 TABLET | Refills: 2 | Status: SHIPPED | OUTPATIENT
Start: 2021-03-11 | End: 2021-04-13

## 2021-03-11 NOTE — PROGRESS NOTES
TSH 4.13 FT4 0.6 FT3 3.0  Will increase levothyroxine dose to 50mcg  She was concerned if will start her cycle. Advised will need to assess once thyroid level stabilized  Can assess in 4 weeks with repeat levels.     Cortisol 7.7

## 2021-03-12 ENCOUNTER — TELEPHONE (OUTPATIENT)
Dept: ENDOCRINOLOGY | Age: 31
End: 2021-03-12

## 2021-03-13 LAB
IGF-1 (INSULIN-LIKE GROWTH I): 230 NG/ML (ref 91–293)
INSULIN-LIKE GROWTH FACTOR-1 Z-SCORE: 0.9

## 2021-03-17 ENCOUNTER — OFFICE VISIT (OUTPATIENT)
Dept: PULMONOLOGY | Age: 31
End: 2021-03-17
Payer: MEDICARE

## 2021-03-17 VITALS
SYSTOLIC BLOOD PRESSURE: 116 MMHG | OXYGEN SATURATION: 100 % | HEIGHT: 66 IN | WEIGHT: 140.4 LBS | DIASTOLIC BLOOD PRESSURE: 78 MMHG | HEART RATE: 77 BPM | BODY MASS INDEX: 22.56 KG/M2

## 2021-03-17 DIAGNOSIS — M79.7 FIBROMYALGIA: Chronic | ICD-10-CM

## 2021-03-17 DIAGNOSIS — F41.1 GAD (GENERALIZED ANXIETY DISORDER): Chronic | ICD-10-CM

## 2021-03-17 DIAGNOSIS — E03.8 OTHER SPECIFIED HYPOTHYROIDISM: Chronic | ICD-10-CM

## 2021-03-17 DIAGNOSIS — G47.10 HYPERSOMNIA: Primary | ICD-10-CM

## 2021-03-17 DIAGNOSIS — R06.83 SNORING: ICD-10-CM

## 2021-03-17 PROBLEM — G40.909 SEIZURE DISORDER (HCC): Chronic | Status: ACTIVE | Noted: 2021-03-03

## 2021-03-17 PROBLEM — G43.109 MIGRAINE WITH AURA AND WITHOUT STATUS MIGRAINOSUS, NOT INTRACTABLE: Chronic | Status: ACTIVE | Noted: 2021-01-05

## 2021-03-17 PROCEDURE — 99204 OFFICE O/P NEW MOD 45 MIN: CPT | Performed by: INTERNAL MEDICINE

## 2021-03-17 PROCEDURE — G8420 CALC BMI NORM PARAMETERS: HCPCS | Performed by: INTERNAL MEDICINE

## 2021-03-17 PROCEDURE — G8484 FLU IMMUNIZE NO ADMIN: HCPCS | Performed by: INTERNAL MEDICINE

## 2021-03-17 PROCEDURE — 1036F TOBACCO NON-USER: CPT | Performed by: INTERNAL MEDICINE

## 2021-03-17 PROCEDURE — G8427 DOCREV CUR MEDS BY ELIG CLIN: HCPCS | Performed by: INTERNAL MEDICINE

## 2021-03-17 ASSESSMENT — SLEEP AND FATIGUE QUESTIONNAIRES
HOW LIKELY ARE YOU TO NOD OFF OR FALL ASLEEP WHILE LYING DOWN TO REST IN THE AFTERNOON WHEN CIRCUMSTANCES PERMIT: 3
HOW LIKELY ARE YOU TO NOD OFF OR FALL ASLEEP IN A CAR, WHILE STOPPED FOR A FEW MINUTES IN TRAFFIC: 1
HOW LIKELY ARE YOU TO NOD OFF OR FALL ASLEEP WHILE SITTING QUIETLY AFTER LUNCH WITHOUT ALCOHOL: 2
HOW LIKELY ARE YOU TO NOD OFF OR FALL ASLEEP WHILE WATCHING TV: 2

## 2021-03-17 NOTE — PROGRESS NOTES
Kellen Elizabeth MD, [place], [facility]  Tiffanie Kehrt CNP  Анна Eaton CNP [address]  Narinder Younger [address] [facility], [address] [facility] [phone]   [facility] [address] Narinder Younger. [address] [facility]. [address] [phone]     [place] [address]  Dept: [phone]  Loc: [phone]    Assessment:      Visit Diagnoses and Associated Orders     Hypersomnia   (New Problem)  -  Primary    needs work-up    POLYSOMNOGRAPHY (PSG) - Diagnostic Testing [75036 Custom]   - Future Order         Snoring   (New Problem)      needs work-up    POLYSOMNOGRAPHY (PSG) - Diagnostic Testing [05413 Custom]   - Future Order         Other specified hypothyroidism   (Stable)           Fibromyalgia   (Stable)           LEONEL (generalized anxiety disorder)   (Stable)                  Plan:      One or more undiagnosed new problem with uncertain prognosis till final diagnosis is made. Differential diagnosis includes but not limited to: LEOLA, PLMD's, narcolepsy, parasomnias. Reviewed LEOLA (which is the highest likelihood diagnosis): pathophysiology, diagnosis, complications and treatment. Instructed her not to drive if drowsy. Continue medications per her PCP and other physicians. Limit caffeine use after 3pm. Will do PSG to rule-out LEOLA and other sleep disorders. 1 wk follow up after study to review her results. The chronic medical conditions listed are directly related to the primary diagnosis listed above. The management of the primary diagnosis affects the secondary diagnosis and vice versa. Reviewed the referral note from Dr. Liv Botello 2/17/2021 which showed the patient is here for preoperative consultation and had symptoms that put her risk for sleep apnea. Also reviewed endocrine notes from Dr. Margie Nieevs on 3/9/2021 -was sent in for thyroid evaluation and diagnosed to hypothyroidism.     Pertinent labs that can affect daytime sleepiness include her TSH on 3/10/2021 which is normal.  Free T4 in the same date was slightly low. A.m. cortisol levels on the same day were normal    This information was analyzed to assess complexity and medical decision making in regards to further testing and management. Continue meds for: hypothyroid, fibromyalgia, and LEONEL. Orders Placed This Encounter   Procedures    POLYSOMNOGRAPHY (PSG) - Diagnostic Testing          Subjective:     Patient ID: Juan Iniguez is a 35 y.o. female. Chief Complaint   Patient presents with    Snoring    Insomnia       HPI:      Juan Iniguez is a 35 y.o. female referred by Arabella Davis MD for a sleep evaluation. She complains of: snoring, witnessed apneas, excessive daytime sleepiness , non-restorative sleep, nocturia, snorting awake, napping, drowsiness while driving and tossing and turning at night. She denies: cataplexy and hypnagogic hallucinations. Symptoms began 6 months ago, rapidly worsening since that time. Has been on trazodone since age 14 to help with sleep. Currently taking 50 mg trazodone along with hydroxyzine 100 mg. Previous evaluation and treatment has included- none    DOT/CDL - No  FAA/'s license -No    Previous Report(s) Reviewed: historical medical records, office notes, andreferral letter(s). Pertinent data has been documented.     Steinauer - Total score: 15    Caffeine Intake - Coffee: 1 cup(s) per day    Social History     Socioeconomic History    Marital status: Single     Spouse name: Not on file    Number of children: Not on file    Years of education: Not on file    Highest education level: Not on file   Occupational History    Not on file   Social Needs    Financial resource strain: Not hard at all    Food insecurity     Worry: Never true     Inability: Never true   [employer] needs     Medical: No     Non-medical: No   Tobacco Use    Smoking status: Former Smoker     Packs/day: 0.20     Types: Cigarettes     Start date: 2015     Quit date: 2017     Years since quittin.2    Smokeless tobacco: Former User   Substance and Sexual Activity    Alcohol use: No    Drug use: Not Currently    Sexual activity: Not on file   Lifestyle    Physical activity     Days per week: Not on file     Minutes per session: Not on file    Stress: Not on file   Relationships    Social connections     Talks on phone: Not on file     Gets together: Not on file     Attends Confucianist service: Not on file     Active member of club or organization: Not on file     Attends meetings of clubs or organizations: Not on file     Relationship status: Not on file    Intimate partner violence     Fear of current or ex partner: Not on file     Emotionally abused: Not on file     Physically abused: Not on file     Forced sexual activity: Not on file   Other Topics Concern    Not on file   Social History Narrative    Not on file        Current Outpatient Medications   Medication Sig Dispense Refill    levothyroxine (SYNTHROID) 50 MCG tablet TAKE 1 TABLET BY MOUTH EVERY DAY 30 tablet 2    traZODone (DESYREL) 50 MG tablet Take 1 tablet by mouth nightly 90 tablet 1    DULoxetine (CYMBALTA) 60 MG extended release capsule Take 1 capsule by mouth daily 90 capsule 1    Rimegepant Sulfate (NURTEC) 75 MG TBDP Take by mouth      Albuterol Sulfate (PROAIR HFA IN) Inhale 1 puff into the lungs every 4 hours as needed      hydrOXYzine (ATARAX) 50 MG tablet Take 50 mg by mouth 3 times daily as needed for Anxiety       mometasone (ELOCON) 0.1 % cream Apply topically daily. 15 g 2    belimumab (BENLYSTA) 120 MG injection Infuse 120 mg intravenously once Every two weeks, then monthly.       pregabalin (LYRICA) 25 MG capsule Take 1 tab po BID 60 capsule 1     Current Facility-Administered Medications   Medication Dose Route Frequency Provider Last Rate Last Admin    belimumab (BENLYSTA) injection 120 mg  120 mg Intravenous Once Kathryn Butcher MD           Allergies as of 2021 - Review Complete 03/17/2021   Allergen Reaction Noted    Latex Hives, Itching, Rash, and Swelling 01/15/2021    Adhesive tape  01/15/2021    Pcn [penicillins] Nausea And Vomiting 03/22/2017    Nickel Other (See Comments) 07/16/2013       Patient Active Problem List   Diagnosis    Systemic lupus erythematosus ([facility] [address]    History of antiphospholipid syndrome    Migraine with aura and without status migrainosus, not intractable    De Quervain's tenosynovitis, bilateral    Cervical myelopathy (HCC)    Seizure disorder ([facility] [address]    Other specified hypothyroidism    Fibromyalgia    LEONEL (generalized anxiety disorder)       Past Medical History:   Diagnosis Date    Anxiety     Bulimia     Depression     Fibromyalgia 3/17/2021    Fibromyalgia syndrome     LEONEL (generalized anxiety disorder) 3/17/2021    Gestational hypertension, antepartum     Lupus ([facility] [address]     just diagnosised with lupus last week    Other specified hypothyroidism 3/17/2021    Seizures ([facility] [address]        Family History   Problem Relation Age of Onset    Hypertension Mother     Thyroid Disease Mother     Depression Father     Sleep Apnea Father     Depression Sister     Anxiety Disorder Sister     Migraines Sister     Glaucoma Maternal Grandmother     Heart Attack Maternal Grandfather     Heart Disease Maternal Grandfather     Cancer Paternal Grandmother     Cancer Paternal Grandfather        Objective:     Vitals:  Weight BMI   Wt Readings from Last 3 Encounters:   03/17/21 140 lb 6.4 oz (63.7 kg)   03/09/21 141 lb 9.6 oz (64.2 kg)   03/04/21 142 lb 3.2 oz (64.5 kg)    Body mass index is 22.66 kg/m². BP HR SaO2   BP Readings from Last 3 Encounters:   03/17/21 116/78   03/09/21 110/78   03/04/21 116/68    Pulse Readings from Last 3 Encounters:   03/17/21 77   03/09/21 117   03/04/21 90    SpO2 Readings from Last 3 Encounters:   03/17/21 100%   03/09/21 95%   02/26/21 99%        Physical Exam  Vitals signs reviewed.    Constitutional: General: She is not in acute distress. Appearance: Normal appearance. She is well-developed. She is not toxic-appearing or diaphoretic. HENT:      Head: Normocephalic and atraumatic. Not macrocephalic and not microcephalic. Right Ear: External ear normal.      Left Ear: External ear normal.      Nose: Nasal deformity, septal deviation and mucosal edema present. Mouth/Throat:      Lips: Pink. Mouth: Mucous membranes are moist.      Tongue: No lesions. Palate: No mass. Pharynx: Uvula midline. No oropharyngeal exudate or uvula swelling. Tonsils: No tonsillar exudate or tonsillar abscesses. Comments: Tonsils: absent bilaterally  Eyes:      General: Lids are normal.      Extraocular Movements: Extraocular movements intact. Conjunctiva/sclera: Conjunctivae normal.      Pupils: Pupils are equal, round, and reactive to light. Neck:      Musculoskeletal: Normal range of motion. Vascular: No JVD. Trachea: Trachea normal.      Comments: Neck Circ: 13.5 inches    Cardiovascular:      Rate and Rhythm: Normal rate and regular rhythm. Heart sounds: Normal heart sounds. Pulmonary:      Effort: Pulmonary effort is normal.      Breath sounds: Normal breath sounds. Musculoskeletal:      Comments: No evidence of cyanosis or clubbing of nails   Skin:     General: Skin is warm. Nails: There is no clubbing. Neurological:      General: No focal deficit present. Mental Status: She is alert.          Electronically signed by Elayne Monteiro MD on3/17/2021 at 2:34 PM

## 2021-03-17 NOTE — LETTER
[facility]  [place] [address] [address] [place] [address]  Phone: [phone]  Fax: [phone]      March 17, 2021       Patient: Pat Fisher   MR Number: [id number]   YOB: 1990   Date of Visit: 3/17/2021     Thank you for allowing me to participate in the care of Pat Fisher. Here is my assessment and plan. Also attached is a copy of her consult note:    ASSESSMENT:  Visit Diagnoses and Associated Orders     Hypersomnia   (New Problem)  -  Primary    needs work-up    POLYSOMNOGRAPHY (PSG) - Diagnostic Testing [24379 Custom]   - Future Order         Snoring   (New Problem)      needs work-up    POLYSOMNOGRAPHY (PSG) - Diagnostic Testing [42214 Custom]   - Future Order         Other specified hypothyroidism   (Stable)           Fibromyalgia   (Stable)           LEONEL (generalized anxiety disorder)   (Stable)                 Plan:  One or more undiagnosed new problem with uncertain prognosis till final diagnosis is made. Differential diagnosis includes but not limited to: LEOLA, PLMD's, narcolepsy, parasomnias. Reviewed LEOLA (which is the highest likelihood diagnosis): pathophysiology, diagnosis, complications and treatment. Instructed her not to drive if drowsy. Continue medications per her PCP and other physicians. Limit caffeine use after 3pm. Will do PSG to rule-out LEOLA and other sleep disorders. 1 wk follow up after study to review her results. The chronic medical conditions listed are directly related to the primary diagnosis listed above. The management of the primary diagnosis affects the secondary diagnosis and vice versa. Reviewed the referral note from Dr. Dominick Torres 2/17/2021 which showed the patient is here for preoperative consultation and had symptoms that put her risk for sleep apnea. Also reviewed endocrine notes from Dr. Shakir Andersen on 3/9/2021 -was sent in for thyroid evaluation and diagnosed to hypothyroidism.     Pertinent labs that can affect daytime sleepiness include her TSH on 3/10/2021 which is normal.  Free T4 in the same date was slightly low. A.m. cortisol levels on the same day were normal    This information was analyzed to assess complexity and medical decision making in regards to further testing and management. Continue meds for: hypothyroid, fibromyalgia, and LEONEL. Orders Placed This Encounter   Procedures    POLYSOMNOGRAPHY (PSG) - Diagnostic Testing         If you have questions or concerns, please do not hesitate to call me. I look forward to following Adi Mikemisty along with you. Sincerely,      Aida Randhawa MD    CC providers:  Landon Claude, MD  [address]

## 2021-03-18 ENCOUNTER — NURSE ONLY (OUTPATIENT)
Dept: RHEUMATOLOGY | Age: 31
End: 2021-03-18
Payer: MEDICARE

## 2021-03-18 VITALS
WEIGHT: 140.2 LBS | SYSTOLIC BLOOD PRESSURE: 120 MMHG | DIASTOLIC BLOOD PRESSURE: 78 MMHG | BODY MASS INDEX: 22.63 KG/M2 | RESPIRATION RATE: 16 BRPM | TEMPERATURE: 97.6 F | HEART RATE: 71 BPM

## 2021-03-18 DIAGNOSIS — M32.9 SYSTEMIC LUPUS ERYTHEMATOSUS, UNSPECIFIED SLE TYPE, UNSPECIFIED ORGAN INVOLVEMENT STATUS (HCC): Primary | ICD-10-CM

## 2021-03-18 PROCEDURE — 96413 CHEMO IV INFUSION 1 HR: CPT | Performed by: INTERNAL MEDICINE

## 2021-03-18 RX ORDER — METHYLPREDNISOLONE SODIUM SUCCINATE 125 MG/2ML
125 INJECTION, POWDER, LYOPHILIZED, FOR SOLUTION INTRAMUSCULAR; INTRAVENOUS ONCE
Status: CANCELLED | OUTPATIENT
Start: 2021-04-05 | End: 2021-04-05

## 2021-03-18 RX ORDER — DIPHENHYDRAMINE HYDROCHLORIDE 50 MG/ML
50 INJECTION INTRAMUSCULAR; INTRAVENOUS ONCE
Status: CANCELLED | OUTPATIENT
Start: 2021-04-05 | End: 2021-04-05

## 2021-03-18 RX ORDER — ACETAMINOPHEN 325 MG/1
650 TABLET ORAL ONCE
Status: CANCELLED | OUTPATIENT
Start: 2021-04-05

## 2021-03-18 RX ORDER — EPINEPHRINE 1 MG/ML
0.3 INJECTION, SOLUTION, CONCENTRATE INTRAVENOUS PRN
Status: CANCELLED | OUTPATIENT
Start: 2021-04-05

## 2021-03-18 RX ORDER — SODIUM CHLORIDE 9 MG/ML
INJECTION, SOLUTION INTRAVENOUS CONTINUOUS
Status: CANCELLED | OUTPATIENT
Start: 2021-04-05

## 2021-03-18 RX ORDER — DIPHENHYDRAMINE HCL 25 MG
25 TABLET ORAL ONCE
Status: CANCELLED | OUTPATIENT
Start: 2021-04-05

## 2021-03-18 RX ORDER — SODIUM CHLORIDE 0.9 % (FLUSH) 0.9 %
10 SYRINGE (ML) INJECTION PRN
Status: CANCELLED | OUTPATIENT
Start: 2021-04-05

## 2021-03-18 NOTE — PROGRESS NOTES
Pt here for Benlysta infusion #2, no follow up visit with Dr. Jeramei Yip, today. No  Adverse effects from previous infusion,  No blood work drawn.  140.2 lbs =63.7 kg   X 10 mg/kg = 637 mg   Rounded to 720 mg. Infusion  tolerated well. Next visit scheduled  in 4 weeks.     IV Gauge: 22  IV Site: Left Wrist  # of Attempts: 1  IV Start: 7853 am  IV Stop: 1050 am    IV Volume:250 ml NSS  IV Bag Lot# [id number]  IV Bag EXP: 06/2022

## 2021-03-19 ENCOUNTER — TELEPHONE (OUTPATIENT)
Dept: SLEEP CENTER | Age: 31
End: 2021-03-19

## 2021-03-25 ENCOUNTER — OFFICE VISIT (OUTPATIENT)
Dept: PRIMARY CARE CLINIC | Age: 31
End: 2021-03-25
Payer: MEDICARE

## 2021-03-25 DIAGNOSIS — Z01.818 PREOP EXAMINATION: Primary | ICD-10-CM

## 2021-03-25 PROCEDURE — G8428 CUR MEDS NOT DOCUMENT: HCPCS | Performed by: NURSE PRACTITIONER

## 2021-03-25 PROCEDURE — 99211 OFF/OP EST MAY X REQ PHY/QHP: CPT | Performed by: NURSE PRACTITIONER

## 2021-03-25 PROCEDURE — G8420 CALC BMI NORM PARAMETERS: HCPCS | Performed by: NURSE PRACTITIONER

## 2021-03-26 LAB — SARS-COV-2, PCR: NOT DETECTED

## 2021-03-29 ENCOUNTER — HOSPITAL ENCOUNTER (OUTPATIENT)
Dept: SLEEP CENTER | Age: 31
Discharge: HOME | End: 2021-03-29
Payer: MEDICARE

## 2021-03-29 DIAGNOSIS — G47.10 HYPERSOMNIA: ICD-10-CM

## 2021-03-29 DIAGNOSIS — R06.83 SNORING: ICD-10-CM

## 2021-03-29 PROCEDURE — 95810 POLYSOM 6/> YRS 4/> PARAM: CPT

## 2021-03-29 PROCEDURE — 95810 POLYSOM 6/> YRS 4/> PARAM: CPT | Performed by: INTERNAL MEDICINE

## 2021-04-01 ENCOUNTER — TELEPHONE (OUTPATIENT)
Dept: PULMONOLOGY | Age: 31
End: 2021-04-01

## 2021-04-05 ENCOUNTER — NURSE ONLY (OUTPATIENT)
Dept: RHEUMATOLOGY | Age: 31
End: 2021-04-05
Payer: MEDICARE

## 2021-04-05 VITALS
WEIGHT: 138.8 LBS | HEART RATE: 100 BPM | DIASTOLIC BLOOD PRESSURE: 58 MMHG | RESPIRATION RATE: 16 BRPM | TEMPERATURE: 97 F | BODY MASS INDEX: 22.4 KG/M2 | SYSTOLIC BLOOD PRESSURE: 110 MMHG

## 2021-04-05 DIAGNOSIS — M32.9 SYSTEMIC LUPUS ERYTHEMATOSUS, UNSPECIFIED SLE TYPE, UNSPECIFIED ORGAN INVOLVEMENT STATUS (HCC): ICD-10-CM

## 2021-04-05 DIAGNOSIS — Z79.899 HIGH RISK MEDICATIONS (NOT ANTICOAGULANTS) LONG-TERM USE: Primary | ICD-10-CM

## 2021-04-05 LAB
ALT SERPL-CCNC: 21 U/L (ref 10–40)
AST SERPL-CCNC: 30 U/L (ref 15–37)
BASOPHILS ABSOLUTE: 0 K/UL (ref 0–0.2)
BASOPHILS RELATIVE PERCENT: 0.6 %
C-REACTIVE PROTEIN: <3 MG/L (ref 0–5.1)
CREAT SERPL-MCNC: 0.7 MG/DL (ref 0.6–1.1)
EOSINOPHILS ABSOLUTE: 0.1 K/UL (ref 0–0.6)
EOSINOPHILS RELATIVE PERCENT: 4.1 %
GFR AFRICAN AMERICAN: >60
GFR NON-AFRICAN AMERICAN: >60
HCT VFR BLD CALC: 40.1 % (ref 36–48)
HEMOGLOBIN: 13.2 G/DL (ref 12–16)
LYMPHOCYTES ABSOLUTE: 1.4 K/UL (ref 1–5.1)
LYMPHOCYTES RELATIVE PERCENT: 43.7 %
MCH RBC QN AUTO: 26.7 PG (ref 26–34)
MCHC RBC AUTO-ENTMCNC: 33 G/DL (ref 31–36)
MCV RBC AUTO: 81 FL (ref 80–100)
MONOCYTES ABSOLUTE: 0.3 K/UL (ref 0–1.3)
MONOCYTES RELATIVE PERCENT: 9.9 %
NEUTROPHILS ABSOLUTE: 1.3 K/UL (ref 1.7–7.7)
NEUTROPHILS RELATIVE PERCENT: 41.7 %
PDW BLD-RTO: 16.7 % (ref 12.4–15.4)
PLATELET # BLD: 179 K/UL (ref 135–450)
PMV BLD AUTO: 8.5 FL (ref 5–10.5)
RBC # BLD: 4.95 M/UL (ref 4–5.2)
SEDIMENTATION RATE, ERYTHROCYTE: 4 MM/HR (ref 0–20)
WBC # BLD: 3.2 K/UL (ref 4–11)

## 2021-04-05 PROCEDURE — 36415 COLL VENOUS BLD VENIPUNCTURE: CPT | Performed by: INTERNAL MEDICINE

## 2021-04-05 PROCEDURE — 96413 CHEMO IV INFUSION 1 HR: CPT | Performed by: INTERNAL MEDICINE

## 2021-04-05 RX ORDER — EPINEPHRINE 1 MG/ML
0.3 INJECTION, SOLUTION, CONCENTRATE INTRAVENOUS PRN
Status: CANCELLED | OUTPATIENT
Start: 2021-05-03

## 2021-04-05 RX ORDER — SODIUM CHLORIDE 9 MG/ML
INJECTION, SOLUTION INTRAVENOUS CONTINUOUS
Status: CANCELLED | OUTPATIENT
Start: 2021-05-03

## 2021-04-05 RX ORDER — SODIUM CHLORIDE 0.9 % (FLUSH) 0.9 %
10 SYRINGE (ML) INJECTION PRN
Status: CANCELLED | OUTPATIENT
Start: 2021-05-03

## 2021-04-05 RX ORDER — ACETAMINOPHEN 325 MG/1
650 TABLET ORAL ONCE
Status: CANCELLED | OUTPATIENT
Start: 2021-05-03

## 2021-04-05 RX ORDER — METHYLPREDNISOLONE SODIUM SUCCINATE 125 MG/2ML
125 INJECTION, POWDER, LYOPHILIZED, FOR SOLUTION INTRAMUSCULAR; INTRAVENOUS ONCE
Status: CANCELLED | OUTPATIENT
Start: 2021-05-03 | End: 2021-05-03

## 2021-04-05 RX ORDER — ACETAMINOPHEN 325 MG/1
650 TABLET ORAL ONCE
Status: DISCONTINUED | OUTPATIENT
Start: 2021-04-05 | End: 2021-04-05 | Stop reason: HOSPADM

## 2021-04-05 RX ORDER — METHYLPREDNISOLONE SODIUM SUCCINATE 125 MG/2ML
40 INJECTION, POWDER, LYOPHILIZED, FOR SOLUTION INTRAMUSCULAR; INTRAVENOUS ONCE
Status: CANCELLED | OUTPATIENT
Start: 2021-05-03 | End: 2021-05-03

## 2021-04-05 RX ORDER — DIPHENHYDRAMINE HYDROCHLORIDE 50 MG/ML
50 INJECTION INTRAMUSCULAR; INTRAVENOUS ONCE
Status: CANCELLED | OUTPATIENT
Start: 2021-05-03 | End: 2021-05-03

## 2021-04-05 RX ORDER — SODIUM CHLORIDE 0.9 % (FLUSH) 0.9 %
5 SYRINGE (ML) INJECTION PRN
Status: CANCELLED | OUTPATIENT
Start: 2021-05-03

## 2021-04-05 RX ORDER — DIPHENHYDRAMINE HCL 25 MG
25 TABLET ORAL ONCE
Status: DISCONTINUED | OUTPATIENT
Start: 2021-04-05 | End: 2021-04-05 | Stop reason: HOSPADM

## 2021-04-05 RX ORDER — HEPARIN SODIUM (PORCINE) LOCK FLUSH IV SOLN 100 UNIT/ML 100 UNIT/ML
500 SOLUTION INTRAVENOUS PRN
Status: CANCELLED | OUTPATIENT
Start: 2021-05-03

## 2021-04-05 RX ORDER — DIPHENHYDRAMINE HCL 25 MG
25 TABLET ORAL ONCE
Status: CANCELLED | OUTPATIENT
Start: 2021-05-03

## 2021-04-12 ENCOUNTER — OFFICE VISIT (OUTPATIENT)
Dept: PRIMARY CARE CLINIC | Age: 31
End: 2021-04-12
Payer: MEDICAID

## 2021-04-12 DIAGNOSIS — Z01.818 PREOP EXAMINATION: Primary | ICD-10-CM

## 2021-04-12 LAB — SARS-COV-2: NOT DETECTED

## 2021-04-12 PROCEDURE — 99211 OFF/OP EST MAY X REQ PHY/QHP: CPT | Performed by: NURSE PRACTITIONER

## 2021-04-13 ENCOUNTER — HOSPITAL ENCOUNTER (OUTPATIENT)
Dept: SLEEP CENTER | Age: 31
Discharge: HOME | End: 2021-04-13
Payer: MEDICARE

## 2021-04-13 DIAGNOSIS — G47.33 OBSTRUCTIVE SLEEP APNEA (ADULT) (PEDIATRIC): ICD-10-CM

## 2021-04-13 DIAGNOSIS — G47.33 OBSTRUCTIVE SLEEP APNEA (ADULT) (PEDIATRIC): Primary | ICD-10-CM

## 2021-04-13 PROCEDURE — 95811 POLYSOM 6/>YRS CPAP 4/> PARM: CPT | Performed by: INTERNAL MEDICINE

## 2021-04-13 PROCEDURE — 95811 POLYSOM 6/>YRS CPAP 4/> PARM: CPT

## 2021-04-13 RX ORDER — LEVOTHYROXINE SODIUM 0.05 MG/1
TABLET ORAL
Qty: 30 TABLET | Refills: 2 | Status: SHIPPED | OUTPATIENT
Start: 2021-04-13 | End: 2021-07-12

## 2021-04-13 NOTE — TELEPHONE ENCOUNTER
Medication:   Requested Prescriptions     Pending Prescriptions Disp Refills    levothyroxine (SYNTHROID) 50 MCG tablet [Pharmacy Med Name: LEVOTHYROXINE 50 MCG TABLET] 30 tablet 2     Sig: TAKE 1 TABLET BY MOUTH EVERY DAY       Last Filled:      Patient Phone Number: [phone] (home)     Last appt: 3/9/2021   Next appt: Visit date not found    Last Thyroid:   Lab Results   Component Value Date    FT3 3.0 03/10/2021    T4FREE 0.6 03/10/2021

## 2021-04-20 ENCOUNTER — TELEPHONE (OUTPATIENT)
Dept: PULMONOLOGY | Age: 31
End: 2021-04-20

## 2021-04-20 DIAGNOSIS — E03.9 ACQUIRED HYPOTHYROIDISM: ICD-10-CM

## 2021-04-20 LAB
T3 FREE: 2.8 PG/ML (ref 2.3–4.2)
T4 FREE: 0.9 NG/DL (ref 0.9–1.8)
TSH REFLEX: 2 UIU/ML (ref 0.27–4.2)

## 2021-04-20 NOTE — PROGRESS NOTES
Madeleine Nielson         : 1990 LEOLA    Diagnosis: [x] LEOLA (G47.33) [] CSA (G47.31) [] Apnea (G47.30)   Length of Need: [x] 12 Months [] 99 Months [] Other:    Machine (TNO!): [x] Respironics Dream Station   2   Auto [] ResMed AirSense     Auto [] Other:     [x]  CPAP () [] Bilevel ()   Mode: [x] Auto [] Spontaneous    Mode: [] Auto [] Spontaneous         P min 10 cmH20  P max 20 chH20                   Comfort Settings:   - Ramp Pressure: 5 cmH2O                                        - Ramp time: 15 min                                     -  Flex/EPR - 3 full time                                    - For ResMed Bilevel (TiMax-4 sec   TiMin- 0.2 sec)     Humidifier: [x] Heated ()        [x] Water chamber replacement ()/ 1 per 6 months        Mask:   [] Nasal () /1 per 3 months [x] Full Face () /1 per 3 months   [] Patient choice -Size and fit mask [x] Patient Choice - Size and fit mask   [] Dispense:  [] Dispense:    [] Headgear () / 1 per 3 months [x] Headgear () / 1 per 3 months   [] Replacement Nasal Cushion ()/2 per month [x] Interface Replacement ()/1 per month   [] Replacement Nasal Pillows ()/2 per month         Tubing: [x] Heated ()/1 per 3 months    [] Standard ()/1 per 3 months [] Other:           Filters: [x] Non-disposable ()/1 per 6 months     [x] Ultra-Fine, Disposable ()/2 per month        Miscellaneous: [] Chin Strap ()/ 1 per 6 months [] O2 bleed-in:       LPM   [] Oximetry on CPAP/Bilevel []  Other:    [x] Modem: ()         Start Order Date: 21    MEDICAL JUSTIFICATION:  I, the undersigned, certify that the above prescribed supplies are medically necessary for this patients wellbeing. In my opinion, the supplies are both reasonable and necessary in reference to accepted standards of medicalpractice in treatment of this patients condition.     Odette Marr MD      NPI: [id number]       Order Signed Date: 21    Electronically signed by Emperatriz Biswas MD on 2021 at 4:04 PM    [facility] EMERGENCY SERVICE  1990  [address]  [phone] (home)   [phone] (mobile)      Insurance Info (confirm with patient if correct):  Payor/Plan Subscr  Sex Relation Sub.  Ins. ID Effective Group Num

## 2021-05-03 ENCOUNTER — OFFICE VISIT (OUTPATIENT)
Dept: RHEUMATOLOGY | Age: 31
End: 2021-05-03
Payer: MEDICARE

## 2021-05-03 ENCOUNTER — NURSE ONLY (OUTPATIENT)
Dept: RHEUMATOLOGY | Age: 31
End: 2021-05-03
Payer: MEDICARE

## 2021-05-03 VITALS
HEART RATE: 84 BPM | DIASTOLIC BLOOD PRESSURE: 70 MMHG | WEIGHT: 141.4 LBS | RESPIRATION RATE: 16 BRPM | BODY MASS INDEX: 22.82 KG/M2 | TEMPERATURE: 98.4 F | SYSTOLIC BLOOD PRESSURE: 118 MMHG

## 2021-05-03 VITALS
WEIGHT: 141.4 LBS | BODY MASS INDEX: 22.82 KG/M2 | DIASTOLIC BLOOD PRESSURE: 68 MMHG | HEART RATE: 86 BPM | SYSTOLIC BLOOD PRESSURE: 120 MMHG | TEMPERATURE: 98.2 F | RESPIRATION RATE: 16 BRPM

## 2021-05-03 DIAGNOSIS — M32.9 SYSTEMIC LUPUS ERYTHEMATOSUS, UNSPECIFIED SLE TYPE, UNSPECIFIED ORGAN INVOLVEMENT STATUS (HCC): Primary | ICD-10-CM

## 2021-05-03 DIAGNOSIS — Z79.899 HIGH RISK MEDICATIONS (NOT ANTICOAGULANTS) LONG-TERM USE: ICD-10-CM

## 2021-05-03 DIAGNOSIS — M79.7 FIBROMYALGIA: ICD-10-CM

## 2021-05-03 LAB
BACTERIA: ABNORMAL /HPF
BILIRUBIN URINE: NEGATIVE
BLOOD, URINE: ABNORMAL
CLARITY: CLEAR
COLOR: YELLOW
CREATININE URINE: 120.2 MG/DL (ref 28–259)
EPITHELIAL CELLS, UA: 2 /HPF (ref 0–5)
GLUCOSE URINE: NEGATIVE MG/DL
HYALINE CASTS: 0 /LPF (ref 0–8)
KETONES, URINE: NEGATIVE MG/DL
LEUKOCYTE ESTERASE, URINE: NEGATIVE
MICROSCOPIC EXAMINATION: YES
NITRITE, URINE: NEGATIVE
PH UA: 6 (ref 5–8)
PROTEIN PROTEIN: 8 MG/DL
PROTEIN UA: NEGATIVE MG/DL
PROTEIN/CREAT RATIO: 0.1 MG/DL
RBC UA: 5 /HPF (ref 0–4)
SPECIFIC GRAVITY UA: 1.02 (ref 1–1.03)
URINE TYPE: ABNORMAL
UROBILINOGEN, URINE: 0.2 E.U./DL
WBC UA: 1 /HPF (ref 0–5)

## 2021-05-03 PROCEDURE — 96413 CHEMO IV INFUSION 1 HR: CPT | Performed by: INTERNAL MEDICINE

## 2021-05-03 PROCEDURE — 81003 URINALYSIS AUTO W/O SCOPE: CPT | Performed by: INTERNAL MEDICINE

## 2021-05-03 PROCEDURE — 99214 OFFICE O/P EST MOD 30 MIN: CPT | Performed by: INTERNAL MEDICINE

## 2021-05-03 RX ORDER — SODIUM CHLORIDE 9 MG/ML
INJECTION, SOLUTION INTRAVENOUS CONTINUOUS
Status: CANCELLED | OUTPATIENT
Start: 2021-05-31

## 2021-05-03 RX ORDER — PREGABALIN 25 MG/1
25 CAPSULE ORAL 2 TIMES DAILY
Qty: 180 CAPSULE | Refills: 1 | Status: SHIPPED | OUTPATIENT
Start: 2021-05-03 | End: 2021-11-03 | Stop reason: SDUPTHER

## 2021-05-03 RX ORDER — SODIUM CHLORIDE 0.9 % (FLUSH) 0.9 %
5 SYRINGE (ML) INJECTION PRN
Status: CANCELLED | OUTPATIENT
Start: 2021-05-31

## 2021-05-03 RX ORDER — SODIUM CHLORIDE 9 MG/ML
INJECTION, SOLUTION INTRAVENOUS CONTINUOUS
Status: DISCONTINUED | OUTPATIENT
Start: 2021-05-03 | End: 2021-05-03 | Stop reason: HOSPADM

## 2021-05-03 RX ORDER — ACETAMINOPHEN 325 MG/1
650 TABLET ORAL ONCE
Status: DISCONTINUED | OUTPATIENT
Start: 2021-05-03 | End: 2021-05-03 | Stop reason: HOSPADM

## 2021-05-03 RX ORDER — ACETAMINOPHEN 325 MG/1
650 TABLET ORAL ONCE
Status: CANCELLED | OUTPATIENT
Start: 2021-05-31

## 2021-05-03 RX ORDER — DIPHENHYDRAMINE HYDROCHLORIDE 50 MG/ML
50 INJECTION INTRAMUSCULAR; INTRAVENOUS ONCE
Status: CANCELLED | OUTPATIENT
Start: 2021-05-31 | End: 2021-05-31

## 2021-05-03 RX ORDER — HEPARIN SODIUM (PORCINE) LOCK FLUSH IV SOLN 100 UNIT/ML 100 UNIT/ML
500 SOLUTION INTRAVENOUS PRN
Status: CANCELLED | OUTPATIENT
Start: 2021-05-31

## 2021-05-03 RX ORDER — SODIUM CHLORIDE 0.9 % (FLUSH) 0.9 %
10 SYRINGE (ML) INJECTION PRN
Status: CANCELLED | OUTPATIENT
Start: 2021-05-31

## 2021-05-03 RX ORDER — EPINEPHRINE 1 MG/ML
0.3 INJECTION, SOLUTION, CONCENTRATE INTRAVENOUS PRN
Status: CANCELLED | OUTPATIENT
Start: 2021-05-31

## 2021-05-03 RX ORDER — METHYLPREDNISOLONE SODIUM SUCCINATE 125 MG/2ML
40 INJECTION, POWDER, LYOPHILIZED, FOR SOLUTION INTRAMUSCULAR; INTRAVENOUS ONCE
Status: CANCELLED | OUTPATIENT
Start: 2021-05-31 | End: 2021-05-31

## 2021-05-03 NOTE — PROGRESS NOTES
Pt here for Benlysta infusion #4, follow up visit with Dr. Lee Score today. No labs. No  Adverse effects from previous infusion. Declined premedications today. Today 141.4 lbs =64.27 kg   X 10 mg/kg = 642 mg   Rounded to 720 mg per MD Infusion  tolerated well. Next visit scheduled  in 4 weeks.   IV Gauge: #22  IV Site: left antecubital  # of Attempts: 1  IV Start: 0945 am  IV Stop: 1110 am

## 2021-05-03 NOTE — PROGRESS NOTES
[address], MD                                                           [address] [place] [address], [address] (T) [phone] (F)      Dear Dr. Janice Velez MD:  Please find Rheumatology assessment. Thank you for giving me the opportunity to be involved in Erich Mcbride's care and I look forward following Erich Ricks along with you. If you have any questions or concerns please feel free to reach me. Note is transcribed using voice recognition software. Inadvertent computerized transcription errors may be present. Patient identification: Erich Mcbride,: 1990,31 y.o. Sex: female     [address] was seen today for arthritis. Diagnoses and all orders for this visit:    Systemic lupus erythematosus, unspecified SLE type, unspecified organ involvement status ([facility] [address]  -     Urinalysis  -     Protein / creatinine ratio, urine    High risk medications (not anticoagulants) long-term use    Fibromyalgia  -     pregabalin (LYRICA) 25 MG capsule; Take 1 capsule by mouth 2 times daily for 92 days. Background-   SLE-diagnosis -based on arthritis, photosensitivity, photosensitive rash, patchy alopecia, possible pleuritis. Abnormal labs including positive IRIS, double-stranded DNA, SSA, depressed C3-C4. Also has noninflammatory discomfort from fibromyalgia causing generalized musculoskeletal pain. Tried several medications-most of them for short duration-Imuran, methotrexate-leukopenia, CellCept-some kind of blood abnormalities, hydroxychloroquine-abnormal eye exam per patient, has been maintained on Benlysta IV therapy for about 5 years, last infusion was in 2020.   She does not notice any change in her symptoms. She is on Cymbalta for fibromyalgia. Is on disability because of PTSD and lupus. A/PToday-    Fibromyalgia-doing much better on Lyrica, prescription renewed. Lupus-Stable-inflammatory arthritis in her finger joints, leukopenia- improved. On Benlysta IV monthly infusion. Raynaud phenomena-persistent, no tissue loss. Is on amlodipine. Plan-  Continue current management. Does not feel comfortable going back on hydroxychloroquine-prolonged use more than 10 years. Concerns for ocular toxicity. Call with any flares, follow-up in 3 months. Patient indicates understanding and agrees with the management plan. I reviewed patient's history, referral documents and electronic medical records. Outside rheumatology records are scanned and reviewed as well as information from care everywhere is reviewed including rheumatology note, labs. #######################################################################      Subjective-  Since last seen, she is doing better in terms of musculoskeletal discomfort after adding Lyrica and starting IV Benlysta. She still has chronic pain but much tolerable. No acutely inflamed joints. No photosensitivity, rashes, alopecia, mucositis, pleurisy, GI/ symptoms. Laboratory parameters are also improving compared to her first visit. Labs below-leukopenia, lymphopenia, depressed complements. Normal urine protein creatinine ratio. Tolerating medications well. No sedation. Denies any infections. All other ROS are negative. PMH, PSH,Social history , Meds reviewed. PHYSICAL EXAM:    Vitals:    /70   Pulse 84   Temp 98.4 °F (36.9 °C) (Infrared)   Resp 16   Wt 141 lb 6.4 oz (64.1 kg)   BMI 22.82 kg/m²   AA)x3 well nourished, and well groomed, normal judgement. MKS: Generalized subjective arthralgias and myalgia, no overtly tender, swollen or inflamed joints noted in exam.  Full range of motion all peripheral joints in upper and lower extremities. Skin: No rashes, no induration or skin thickening or nodules. HEENT: Normal lids, lacrimal glands and pupils. No oral or nasal ulcers. Salivary glands reveal no evidence of abnormality. External inspection of the ears and nose within normal limits. Neck: No adenopathy or thyroid enlargement. Chest: Normal effort, CTA bilaterally, S1-S2 regular. DATA:   Lab Results   Component Value Date    WBC 3.2 (L) 04/05/2021    HGB 13.2 04/05/2021    HCT 40.1 04/05/2021    MCV 81.0 04/05/2021     04/05/2021         Chemistry        Component Value Date/Time     01/22/2021 0854    K 3.7 01/22/2021 0854     01/22/2021 0854    CO2 28 01/22/2021 0854    BUN 15 01/22/2021 0854    CREATININE 0.7 04/05/2021 1112        Component Value Date/Time    CALCIUM 9.7 01/22/2021 0854    ALKPHOS 79 01/22/2021 0854    AST 30 04/05/2021 1112    ALT 21 04/05/2021 1112    BILITOT 0.4 01/22/2021 0854          No results found for: Vishal Panning  Lab Results   Component Value Date    CRP <3.0 04/05/2021     Lab Results   Component Value Date    IRIS POSITIVE (A) 01/22/2021    SEDRATE 4 04/05/2021     No results found for: CKTOTAL  No results found for: TSH    C4 9      Total time 32 minutes that includes the following-  Preparing to see the patient such as reviewing patients records, pre-charting, preparing the visit on the same day, performing a medically appropriate history and physical examination, counseling and educating patient about diagnosis, management plan, ordering appropriate testings, prescriptions, communicating findings to other care providers, and documenting clinical information in electronic medical record. A/P- See above.

## 2021-05-21 ENCOUNTER — OFFICE VISIT (OUTPATIENT)
Dept: INTERNAL MEDICINE CLINIC | Age: 31
End: 2021-05-21
Payer: MEDICARE

## 2021-05-21 ENCOUNTER — NURSE TRIAGE (OUTPATIENT)
Dept: OTHER | Facility: CLINIC | Age: 31
End: 2021-05-21

## 2021-05-21 VITALS
HEIGHT: 66 IN | WEIGHT: 142 LBS | SYSTOLIC BLOOD PRESSURE: 122 MMHG | TEMPERATURE: 97.5 F | DIASTOLIC BLOOD PRESSURE: 80 MMHG | BODY MASS INDEX: 22.82 KG/M2

## 2021-05-21 DIAGNOSIS — G43.109 MIGRAINE WITH AURA AND WITHOUT STATUS MIGRAINOSUS, NOT INTRACTABLE: ICD-10-CM

## 2021-05-21 DIAGNOSIS — N90.89 CLITORAL IRRITATION: Primary | ICD-10-CM

## 2021-05-21 PROCEDURE — 99213 OFFICE O/P EST LOW 20 MIN: CPT | Performed by: INTERNAL MEDICINE

## 2021-05-21 RX ORDER — CLOTRIMAZOLE AND BETAMETHASONE DIPROPIONATE 10; .64 MG/G; MG/G
CREAM TOPICAL
Qty: 15 G | Refills: 0 | Status: SHIPPED | OUTPATIENT
Start: 2021-05-21 | End: 2022-08-18 | Stop reason: SDUPTHER

## 2021-05-21 ASSESSMENT — PATIENT HEALTH QUESTIONNAIRE - PHQ9
SUM OF ALL RESPONSES TO PHQ9 QUESTIONS 1 & 2: 1
SUM OF ALL RESPONSES TO PHQ QUESTIONS 1-9: 1

## 2021-05-21 NOTE — PROGRESS NOTES
Kady Menendez (:  1990) is a 28 y.o. female,Established patient, here for evaluation of the following chief complaint(s):  Vaginal Itching (clitoral area with pain/itching x1mo.)         ASSESSMENT/PLAN:  1. Clitoral irritation   - suspect yeast, though on exam inflammation is not very significant. Treat with clotrimazole-betamethasone twice daily for 1-2 weeks  2. Migraine with aura and without status migrainosus, not intractable  -     Nanda Joshi MD, Neurology, [facility]      Return if symptoms worsen or fail to improve. Subjective   SUBJECTIVE/OBJECTIVE:  HPI    She has been experiencing itching and sharp pain around the clitoris for about 2 weeks. She has not noted a rash but talking very closely. No vaginal discharge. Needs a new referral to neurologist for migraines, her prior neurologist is now out of network on her insurance. Review of Systems       Objective   Physical Exam  Vitals reviewed. Constitutional:       General: She is not in acute distress. Appearance: Normal appearance. HENT:      Head: Normocephalic and atraumatic. Pulmonary:      Effort: Pulmonary effort is normal. No respiratory distress. Genitourinary:     Comments: Clitoral piercing does not appear infected or inflamed  Mid erythema and tenderness along the right side of the clitoris and inner labia  Neurological:      Mental Status: She is alert. An electronic signature was used to authenticate this note.     --Janice Velez MD

## 2021-05-21 NOTE — TELEPHONE ENCOUNTER
Reason for Disposition   MODERATE-SEVERE itching (i.e., interferes with school, work, or sleep)    Answer Assessment - Initial Assessment Questions  1. SYMPTOM: \"What's the main symptom you're concerned about? \" (e.g., pain, itching, dryness)      Clitoral pain. 2. LOCATION: \"Where is the pain located? \" (e.g., inside/outside, left/right)     Clitoris. 3. ONSET: \"When did the pain start?\"      1 month ago. 4. PAIN: \"Is there any pain? \" If so, ask: \"How bad is it? \" (Scale: 1-10; mild, moderate, severe)  It is constant pain and it is 4 or 5/10.         5. ITCHING: \"Is there any itching? \" If so, ask: \"How bad is it? \" (Scale: 1-10; mild, moderate, severe)      Yes, it is a 8/10. 6. CAUSE: \"What do you think is causing the discharge? \" \"Have you had the same problem before? What happened then? \"      No unsual discharge. Pt has not used new detergents or soap. Hormonal changes. 7. OTHER SYMPTOMS: \"Do you have any other symptoms? \" (e.g., fever, itching, vaginal bleeding, pain with urination, injury to genital area, vaginal foreign body)  Skin above the clitoris has a wound that has healed and has split open. She describes it as a scratch but denies any injury to the area. Pt stated she has an old piercing on her clitoral delacruz. 8. PREGNANCY: \"Is there any chance you are pregnant? \" \"When was your last menstrual period? \"      Denies, LMP 5/2. Protocols used: VAGINAL SYMPTOMS-ADULT-    Received call from Olimpia Cameron at pre-service center [facility] [place] with Red Flag Complaint. Brief description of triage: Vaginal Symptoms, clitoral pain and itching. Pt has not had sex recently, STD exposure is not a concern and clitoral delacruz piercing is over a decade old. Triage indicates for patient to be seen in office today. If there are no available appointments, please go to [facility] today.   Care advice provided, patient verbalizes understanding; denies any other questions or concerns; instructed to call back for any new or worsening symptoms. Writer provided warm transfer to [facility] ([facility]) at [facility] for appointment scheduling. Attention Provider: Thank you for allowing me to participate in the care of your patient. The patient was connected to triage in response to information provided to the ECC. Please do not respond through this encounter as the response is not directed to a shared pool.

## 2021-06-07 ENCOUNTER — NURSE ONLY (OUTPATIENT)
Dept: RHEUMATOLOGY | Age: 31
End: 2021-06-07
Payer: MEDICARE

## 2021-06-07 VITALS
RESPIRATION RATE: 16 BRPM | HEART RATE: 80 BPM | WEIGHT: 141.4 LBS | SYSTOLIC BLOOD PRESSURE: 120 MMHG | BODY MASS INDEX: 22.82 KG/M2 | DIASTOLIC BLOOD PRESSURE: 68 MMHG | TEMPERATURE: 98.8 F

## 2021-06-07 DIAGNOSIS — M32.9 SYSTEMIC LUPUS ERYTHEMATOSUS, UNSPECIFIED SLE TYPE, UNSPECIFIED ORGAN INVOLVEMENT STATUS (HCC): Primary | ICD-10-CM

## 2021-06-07 PROCEDURE — 96413 CHEMO IV INFUSION 1 HR: CPT | Performed by: INTERNAL MEDICINE

## 2021-06-07 RX ORDER — SODIUM CHLORIDE 0.9 % (FLUSH) 0.9 %
5 SYRINGE (ML) INJECTION PRN
Status: CANCELLED | OUTPATIENT
Start: 2021-06-28

## 2021-06-07 RX ORDER — SODIUM CHLORIDE 0.9 % (FLUSH) 0.9 %
10 SYRINGE (ML) INJECTION PRN
Status: CANCELLED | OUTPATIENT
Start: 2021-06-28

## 2021-06-07 RX ORDER — EPINEPHRINE 1 MG/ML
0.3 INJECTION, SOLUTION, CONCENTRATE INTRAVENOUS PRN
Status: CANCELLED | OUTPATIENT
Start: 2021-06-28

## 2021-06-07 RX ORDER — ACETAMINOPHEN 325 MG/1
650 TABLET ORAL ONCE
Status: DISCONTINUED | OUTPATIENT
Start: 2021-06-07 | End: 2021-06-07 | Stop reason: HOSPADM

## 2021-06-07 RX ORDER — SODIUM CHLORIDE 9 MG/ML
INJECTION, SOLUTION INTRAVENOUS CONTINUOUS
Status: DISCONTINUED | OUTPATIENT
Start: 2021-06-07 | End: 2021-06-07 | Stop reason: HOSPADM

## 2021-06-07 RX ORDER — DIPHENHYDRAMINE HYDROCHLORIDE 50 MG/ML
50 INJECTION INTRAMUSCULAR; INTRAVENOUS ONCE
Status: CANCELLED | OUTPATIENT
Start: 2021-06-28 | End: 2021-06-28

## 2021-06-07 RX ORDER — ACETAMINOPHEN 325 MG/1
650 TABLET ORAL ONCE
Status: CANCELLED | OUTPATIENT
Start: 2021-06-28

## 2021-06-07 RX ORDER — HEPARIN SODIUM (PORCINE) LOCK FLUSH IV SOLN 100 UNIT/ML 100 UNIT/ML
500 SOLUTION INTRAVENOUS PRN
Status: CANCELLED | OUTPATIENT
Start: 2021-06-28

## 2021-06-07 RX ORDER — METHYLPREDNISOLONE SODIUM SUCCINATE 40 MG/ML
40 INJECTION, POWDER, LYOPHILIZED, FOR SOLUTION INTRAMUSCULAR; INTRAVENOUS ONCE
Status: CANCELLED | OUTPATIENT
Start: 2021-06-28 | End: 2021-06-28

## 2021-06-07 RX ORDER — SODIUM CHLORIDE 9 MG/ML
INJECTION, SOLUTION INTRAVENOUS CONTINUOUS
Status: CANCELLED | OUTPATIENT
Start: 2021-06-28

## 2021-06-07 NOTE — PATIENT INSTRUCTIONS
Patient Education        belimumab  Pronunciation:  be CASTRO lissette mab  Brand:  Niklysbritton  What is the most important information I should know about belimumab? When this medicine is injected into a vein, tell your caregiver right away if you feel anxious, nauseated, light-headed, itchy, or short of breath. Belimumab affects your immune system. You may get infections more easily, even serious or fatal infections. Call your doctor if you have a fever, chills, cough with mucus, skin sores, warmth or redness under your skin, increased urination, or burning when you urinate. Belimumab may also cause heart problems. Call your doctor at once if you have chest pain, nausea, dizziness, sweating, and trouble breathing. Report any new or worsening mental health symptoms to your doctor, such as: depression, mood or behavior changes, trouble sleeping, or thoughts about hurting yourself or others. What is belimumab? Belimumab is a monoclonal antibody that affects the actions of the body's immune system. Monoclonal antibodies are made to target and destroy only certain cells in the body. This may help to protect healthy cells from damage. Belimumab is used together with other lupus medicines to treat active systemic lupus erythematosus (SLE). Belimumab helps decrease disease activity more than when the other medicines are used alone. Belimumab is for use in adults and children at least 11years old. Belimumab is not for use in people who have severe kidney problems caused by SLE, or have active SLE that affects the central nervous system (brain, nerves, and spinal cord). Belimumab may also be used for other purposes not listed in this medication guide. What should I discuss with my healthcare provider before using belimumab? You should not use belimumab if you are allergic to it.   Tell your doctor if you have ever had:  · an active or chronic infection;  · depression or mental illness;  · suicidal thoughts or actions;  · cancer; or  · if you are using cyclophosphamide, biologic medicines, or other monoclonal antibody medicines. Belimumab may increase your risk of certain cancers by changing the way your immune system works. Ask your doctor about your individual risk. Some people have thoughts about suicide while using belimumab. Your doctor will need to check your progress at regular visits. Your family or other caregivers should also be alert to changes in your mood or symptoms. Belimumab may affect the immune system of your baby if you use this medicine while you are pregnant. Use effective birth control to prevent pregnancy while you are using belimumab and for at least 4 months after your last dose. Tell your doctor if you become pregnant. Belimumab may affect your baby's immune system, but having SLE during pregnancy may cause complications such as worsened lupus, eclampsia (dangerously high blood pressure), premature birth, miscarriage, or growth problems in the unborn baby. SLE in the mother may also cause lupus or heart problems to develop in the . The benefit of treating SLE may outweigh any risks to the baby. If you are pregnant, your name may be listed on a pregnancy registry to track the effects of belimumab on the baby. Make sure any doctor caring for your  baby knows if you used belimumab while you were pregnant. It may not be safe to breastfeed while using this medicine. Ask your doctor about any risk. How is belimumab given? Follow all directions on your prescription label and read all medication guides or instruction sheets. Use the medicine exactly as directed. Belimumab is given as an infusion into a vein, usually every 2 to 4 weeks. A healthcare provider will give you this injection. The medicine must be given slowly, and the infusion can take about 1 hour to complete. Belimumab is also injected under the skin, usually once per week.  A healthcare provider may teach you how to for an intravenous infusion of belimumab. What happens if I overdose? Seek emergency medical attention or call the Poison Help line at 1-778.704.5197. What should I avoid while using belimumab? Avoid being near people who are sick or have infections. Tell your doctor at once if you develop signs of infection. Do not receive a \"live\" vaccine while using belimumab. The vaccine may not work as well and may not fully protect you from disease. Live vaccines include measles, mumps, rubella (MMR), rotavirus, typhoid, yellow fever, varicella (chickenpox), zoster (shingles), and nasal flu (influenza) vaccine. What are the possible side effects of belimumab? Get emergency medical help if you have signs of an allergic reaction:  hives, itching; feeling anxious or light-headed; difficult breathing; swelling of your face, lips, tongue, or throat. Some side effects may occur during the injection. Tell your caregiver if you feel anxious, nauseated, light-headed, itchy, or have trouble breathing, severe headache, or skin redness and swelling. You may get infections more easily, even serious or fatal infections. Stop using belimumab and call your doctor right away if you have signs of infection such as:  · fever, chills;  · skin sores, warmth, or redness;  · cough with mucus, chest pain, shortness of breath;  · pain or burning when you urinate;  · urinating more than usual; or  · bloody diarrhea. Belimumab may cause a serious brain infection that can lead to disability or death. Call your doctor right away if you have problems with speech, thought, vision, or muscle movement. These symptoms may start gradually and get worse quickly. Also call your doctor at once if you have:  · wheezing, chest tightness, trouble breathing; or  · new or worsening depression, anxiety, mood or behavior changes, trouble sleeping, risk-taking behavior, or thoughts about hurting yourself or others.   Common side effects may include:  · nausea, diarrhea;  · fever, sore throat, runny or stuffy nose, cough, chest tightness;  · pain, itching, redness, or swelling where the injection was given;  · pain in your arms or legs;  · headache, depressed mood; or  · sleep problems (insomnia). This is not a complete list of side effects and others may occur. Call your doctor for medical advice about side effects. You may report side effects to FDA at 1-976-RAP-9033. What other drugs will affect belimumab? Other drugs may affect belimumab, including prescription and over-the-counter medicines, vitamins, and herbal products. Tell your doctor about all your current medicines and any medicine you start or stop using. Where can I get more information? Your doctor or pharmacist can provide more information about belimumab. Remember, keep this and all other medicines out of the reach of children, never share your medicines with others, and use this medication only for the indication prescribed. Every effort has been made to ensure that the information provided by [address] [place] [address]  is accurate, up-to-date, and complete, but no guarantee is made to that effect. Drug information contained herein may be time sensitive. [employer] information has been compiled for use by healthcare practitioners and consumers in the [place] and therefore [employer] does not warrant that uses outside of the [place] are appropriate, unless specifically indicated otherwise. [facility] drug information does not endorse drugs, diagnose patients or recommend therapy. [facility] drug information is an informational resource designed to assist licensed healthcare practitioners in caring for their patients and/or to serve consumers viewing this service as a supplement to, and not a substitute for, the expertise, skill, knowledge and judgment of healthcare practitioners.  The absence of a warning for a given drug or drug combination in no way should be construed to indicate that the drug or drug combination is safe, effective or appropriate for any given patient. [facility] does not assume any responsibility for any aspect of healthcare administered with the aid of information [facility] provides. The information contained herein is not intended to cover all possible uses, directions, precautions, warnings, drug interactions, allergic reactions, or adverse effects. If you have questions about the drugs you are taking, check with your doctor, nurse or pharmacist.  Copyright 2714-8154 [address]: 7.01. Revision date: 10/2/2020. Care instructions adapted under license by [facility] ([facility]). If you have questions about a medical condition or this instruction, always ask your healthcare professional. [address] any warranty or liability for your use of this information. Thank you for choosing [facility] ([facility]) for your infusion. We look forward to serving you! The Day of Your Infusion   On the day of your appointment, please arrive 5-10 minutes early. You will go to the  and check in. Preparing for Your Infusion   Please be sure to drink at least 16 ounces of water, bring a list of your current medications, insurance card, and copayment. Please wear comfortable clothes and dress in layers. You are welcome to bring a blanket and pillow, your laptop/tablet, and whatever you might need to be comfortable and pass the time during your infusion. When to Cancel   Please contact your physician if you have a fever, infection, are on antibiotics, have yellow/brown/green drainage, or plan to have surgery. If you have any questions about whether you should receive your infusion, please contact your physician. If you need to cancel your appointment, please call at least 48 hours in advance. If you are more than 15 minutes late, you may be asked to reschedule. In the event of an emergency or reaction, please call 911 or contact your prescriber.     We look forward to caring for you. If you have any questions or concerns, please feel free to call or message us thru [address] [place] location: [phone]  [place] location: [phone]    Your Responsibilities:   Provide accurate and complete information about your health, address, telephone number, date of birth, insurance carrier and employer.  Call if you cannot keep your appointment.  Be respectful of your [facility] team.    Please remember that the space is shared with other patients. [facility] asks that you be considerate of your language and conduct.  Give us a copy of your advance directive.  Ask questions if there is anything you do not understand.  Report unexpected changes in your health.  Take responsibility for the consequences of refusing care or not following instructions.  Pay your bills and take responsibility for your copay assistance. Is compliance to medication therapy important? Our nurses understand how crucial a patient's compliance to their prescribed therapy is to the outcomes of the therapy program for their specific disease state. Noncompliance or poor compliance to the patient medication treatment plan significantly increases the risk of poor health outcomes while increasing the risk of an adverse event, overall cost of care, and/or negative impacts on quality of life. As a partner in your healthcare, we want you to understand that infusion therapy is typically a long term commitment. The treatment decided by you and your physician will require compliance on your part. Patient Consent     1. CONSENT TO INFUSION THERAPY, MEDICAL CARE AND TREATMENT   I voluntarily consent to any and all health care treatment and diagnostic procedures, including but not limited to infusion therapy, medical examinations, and tests, provided by [facility]) Rheumatology and its associated physicians, providers, and nurses.   2. CANCELLATION AND NO SHOW  I agree to be responsible for coming to all appointments and being respectful of my appointment time. Should I not show up for an infusion appointment, cancel frequently, or cancel without giving at least a 48 hour notice, I may not be rescheduled at the office for infusion and will have to have treatment at another location. 3. COPAYMENT AND FINANCIAL RESPONSIBILITY  I understand that I am responsible for any copays associated with my medication and administration fee that my insurance carrier does not cover. It is up to my insurance who decides what my responsibility is for the infusion. 4. INSURANCE UPDATES  I understand and agree that at any time that I change insurance, I will immediately notify the office and provide a copy of the insurance card. If I do not, this may delay my infusion treatment. If I do not provide updated insurance and a new authorization is not obtained, I may be responsible for the full cost of my infusion. If I do not supply the needed insurance information in a timely manner, my treatment may also be delayed. 5. COPAY [place]  I understand that if I have been signed up for a copay program, that I am responsible for turning in all information to the program in a timely manner. If I do not, this will result in my being responsible for paying the balance and may result in collection attempts. To avoid any disruptions in your treatment, we ask that you please notify us immediately if you have any insurance changes such as a new plan or if you might be losing coverage. If the infusion nurse does not receive your new insurance information, your infusion may be temporarily held until we can check your benefits and get an authorization from your new insurance. Please notify us BEFORE your next infusion, as it may take several weeks to check your benefits and for your new insurance to approve your continuing treatment.   Please know that we want to help you in anyway we can, so notify us early! If your insurance coverage changes in ANY way you must do the follow:   Contact our office and speak with the Infusion Nurse. All calls will be returned within 24 hours, except on weekends. We will need a copy of both the front and back of your new card.  Infusion appointments will be placed on HOLD until a new Verification of Benefits and/or Prior Authorization (if required) is acquired   a. A NEW VOB (verification of benefits) must be requested to verify coverage for the medication and infusion fees. b. If a Prior Authorization is needed, we will obtain this and provide information needed to your insurance. c. This process can take 2-3 weeks depending on insurance.  Once the new VOB has been obtained the Infusion RN or a member of the Rheumatology staff will contact you to discuss changes in coverage. A new infusion schedule can be made at this time.  Changes in insurance may disqualify you from receiving infusion coverage, however, we will do everything we can to get your treatment, as we know it is necessary for your disease and quality of living. *As a reminder, anyone on Patient Copay Assistance (Commercial insurance ONLY. This does NOT pertain to Medicare or Medicaid). You should have received information from the drug  regarding how the copay programs work. If you have not received the information, please let us know. You are responsible for sending in the Explanation of Benefits (EOBs) and [employer] for dates of service to your individual Savings Program and then following thru to ensure it was approved. If you are having difficulty understanding the program, please reach out immediately for assistance. If these are not sent in a timely manner, you will be responsible for the large portion that your insurance doesn't cover, which can be in the thousands!   These charges can add up quickly and we don't want you to be stuck with large bills

## 2021-06-07 NOTE — PROGRESS NOTES
Here today for Eri #5. No  Adverse effects from previous infusion. No labs or  MD visit today. Hcnwv471.4 lbs =64.27 kg   X 10 mg/kg = 642 mg   Rounded to 720 mg per MD Infusion  tolerated well. Next visit scheduled  in 4 weeks.   IV Gauge: #24  IV Site: left antecubital  # of Attempts: 2  IV Start: 1040 am  IV Stop: 1140 am

## 2021-06-16 ENCOUNTER — TELEPHONE (OUTPATIENT)
Dept: PULMONOLOGY | Age: 31
End: 2021-06-16

## 2021-06-16 NOTE — TELEPHONE ENCOUNTER
[address] and spoke w/Annette to get modem connected for download. She is going to look into it and get it set up. F/u if nothing by Monday.

## 2021-06-22 ENCOUNTER — VIRTUAL VISIT (OUTPATIENT)
Dept: PULMONOLOGY | Age: 31
End: 2021-06-22
Payer: MEDICARE

## 2021-06-22 DIAGNOSIS — M79.7 FIBROMYALGIA: Chronic | ICD-10-CM

## 2021-06-22 DIAGNOSIS — F41.1 GAD (GENERALIZED ANXIETY DISORDER): Chronic | ICD-10-CM

## 2021-06-22 DIAGNOSIS — G47.33 OSA (OBSTRUCTIVE SLEEP APNEA): Primary | ICD-10-CM

## 2021-06-22 DIAGNOSIS — G43.109 MIGRAINE WITH AURA AND WITHOUT STATUS MIGRAINOSUS, NOT INTRACTABLE: Chronic | ICD-10-CM

## 2021-06-22 DIAGNOSIS — G40.909 SEIZURE DISORDER (HCC): Chronic | ICD-10-CM

## 2021-06-22 PROCEDURE — 99214 OFFICE O/P EST MOD 30 MIN: CPT | Performed by: NURSE PRACTITIONER

## 2021-06-22 ASSESSMENT — SLEEP AND FATIGUE QUESTIONNAIRES
HOW LIKELY ARE YOU TO NOD OFF OR FALL ASLEEP WHILE SITTING AND TALKING TO SOMEONE: 0
HOW LIKELY ARE YOU TO NOD OFF OR FALL ASLEEP WHILE SITTING QUIETLY AFTER LUNCH WITHOUT ALCOHOL: 2
HOW LIKELY ARE YOU TO NOD OFF OR FALL ASLEEP WHILE SITTING INACTIVE IN A PUBLIC PLACE: 0
HOW LIKELY ARE YOU TO NOD OFF OR FALL ASLEEP WHEN YOU ARE A PASSENGER IN A CAR FOR AN HOUR WITHOUT A BREAK: 2
HOW LIKELY ARE YOU TO NOD OFF OR FALL ASLEEP WHILE WATCHING TV: 2
HOW LIKELY ARE YOU TO NOD OFF OR FALL ASLEEP WHILE SITTING AND READING: 3
HOW LIKELY ARE YOU TO NOD OFF OR FALL ASLEEP WHILE LYING DOWN TO REST IN THE AFTERNOON WHEN CIRCUMSTANCES PERMIT: 3
ESS TOTAL SCORE: 12
HOW LIKELY ARE YOU TO NOD OFF OR FALL ASLEEP IN A CAR, WHILE STOPPED FOR A FEW MINUTES IN TRAFFIC: 0

## 2021-06-22 NOTE — ASSESSMENT & PLAN NOTE
Chronic-not fully controlled: Reviewed and analyzed results of physiologic download from patient's machine and reviewed with patient. Supplies and parts as needed for her machine. These are medically necessary. Limit caffeine use after 3pm. Based on the analyzed data will change following settings: Pmin 10, Pmax: 20, Flex: 3. Changed settings back to settings that were recommended on her titration study via machine modem. Discussed supply ordering schedule. Instructed not to drive unless had 4 hrs of effective therapy for his LEOLA the night before. Reviewed the risks of under or untreated LEOLA including, but not limited to, higher risks of motor vehicle accidents, stroke, heart attacks, and death. she understands and accepts all these risks. The patient is advised to use the machine every night. Follow up in 6-8 weeks for compliance.

## 2021-06-22 NOTE — PROGRESS NOTES
Valentina Barr MD, Jorge Ardon, [facility]  Tiffanie Kehrt CNP  Moreno Harvey CNP Laminee [address]gs [address]  Melanie Matycyn [address] [facility], [address] [facility] [phone]   [facility] [address] Melanie Means. [address] [place] [address]. Dilshad Do[address] [phone]     Video Visit- Follow up    Pursuant to the emergency declaration under the [address] [place] [address] [facility] authority and the [employer] and [employer] Act, this Virtual  Visit was conducted, with patient's consent, to reduce the patient's risk of exposure to COVID-19 and provide continuity of care for an established patient. Services were provided through a video synchronous discussion virtually to substitute for in-person clinic visit. Patient was located in their home. Assessment/Plan:      1. LEOLA (obstructive sleep apnea)  Assessment & Plan:  Chronic-not fully controlled: Reviewed and analyzed results of physiologic download from patient's machine and reviewed with patient. Supplies and parts as needed for her machine. These are medically necessary. Limit caffeine use after 3pm. Based on the analyzed data will change following settings: Pmin 10, Pmax: 20, Flex: 3. Changed settings back to settings that were recommended on her titration study via machine modem. Discussed supply ordering schedule. Instructed not to drive unless had 4 hrs of effective therapy for his LEOLA the night before. Reviewed the risks of under or untreated LEOLA including, but not limited to, higher risks of motor vehicle accidents, stroke, heart attacks, and death. she understands and accepts all these risks. The patient is advised to use the machine every night. Follow up in 6-8 weeks for compliance. 2. Seizure disorder [facility])  Assessment & Plan:  Chronic- Stable. Discussed the importance of treating obstructive sleep apnea as part of the management of this disorder. Cont any meds per PCP and other physicians.   3. LOENEL (generalized anxiety disorder) Assessment & Plan:  Chronic- Stable. Discussed the importance of treating obstructive sleep apnea as part of the management of this disorder. Cont any meds per PCP and other physicians. 4. Fibromyalgia  Assessment & Plan:  Chronic- Stable. Discussed the importance of treating obstructive sleep apnea as part of the management of this disorder. Cont any meds per PCP and other physicians. 5. Migraine with aura and without status migrainosus, not intractable  Assessment & Plan:  Chronic- Stable. Discussed the importance of treating obstructive sleep apnea as part of the management of this disorder. Cont any meds per PCP and other physicians. Reviewed, analyzed, and documented physiologic data from patient's PAP machine. This information was analyzed to assess complexity and medical decision making in regards to further testing and management. The primary encounter diagnosis was LEOLA (obstructive sleep apnea). Diagnoses of Seizure disorder ([facility] [address] LEONEL (generalized anxiety disorder), Fibromyalgia, and Migraine with aura and without status migrainosus, not intractable were also pertinent to this visit. The chronic medical conditions listed are directly related to the primary diagnosis listed above. The management of the primary diagnosis affects the secondary diagnosis and vice versa. Subjective:     Patient ID: Debbie Beck is a 28 y.o. female. Chief Complaint   Patient presents with    Sleep Apnea     Subjective   HPI:    Machine Modem/Download Info:  Compliance (hours/night): 4.5 hrs/night  % of nights >= 4 hrs: 34 %  Download AHI (/hour): 1.3 /HR  Average CPAP Pressure : 7.6 cmH2O      APAP - Settings  Pressure Min: 6 cmH2O  Pressure Max: 9 cmH2O                 Comfort Settings  Heated Tubing (Yes/No): Yes  Flex/EPR (0-3): 1 PAP Mask  Clinically Relevant Leak: No     Had polysomnography on 3/29/2021 which showed CMS AHI-11.6/hr with low sat-91% and time below 90% of 0 min.  Had titration

## 2021-06-30 ENCOUNTER — OFFICE VISIT (OUTPATIENT)
Dept: NEUROLOGY | Age: 31
End: 2021-06-30
Payer: MEDICARE

## 2021-06-30 VITALS
SYSTOLIC BLOOD PRESSURE: 119 MMHG | HEIGHT: 66 IN | BODY MASS INDEX: 22.66 KG/M2 | DIASTOLIC BLOOD PRESSURE: 80 MMHG | HEART RATE: 107 BPM | WEIGHT: 141 LBS

## 2021-06-30 DIAGNOSIS — G47.33 OSA (OBSTRUCTIVE SLEEP APNEA): ICD-10-CM

## 2021-06-30 DIAGNOSIS — G43.019 INTRACTABLE MIGRAINE WITHOUT AURA AND WITHOUT STATUS MIGRAINOSUS: Primary | ICD-10-CM

## 2021-06-30 PROCEDURE — 99204 OFFICE O/P NEW MOD 45 MIN: CPT | Performed by: PSYCHIATRY & NEUROLOGY

## 2021-06-30 NOTE — LETTER
[facility]  [address] [facility] [address] [address]  Phone: [phone]  Fax: [phone]           James Worthy MD      June 30, 2021     Patient: Ranjit Corey   MR Number: [id number]   YOB: 1990   Date of Visit: 6/30/2021       Dear Dr. Xi Frederick: Thank you for referring [place] to me for evaluation/treatment. Below are the relevant portions of my assessment and plan of care. If you have questions, please do not hesitate to call me. I look forward to following [employer] along with you. Sincerely,    MD James Silva MD    CC providers:  Alee Valle MD  [address]

## 2021-06-30 NOTE — PROGRESS NOTES
NEUROLOGY CONSULTATION     Chief Complaint   Patient presents with    Follow-up       HISTORY OF PRESENT ILLNESS :    Nemesio Hope is a 28 y.o. female who is referred by Dr. Ember Marcial   History was obtained from patient  Patient was referred for evaluation of intractable migraines and headaches. Patient states that she has had migraine headaches for a number of years. She used to live in [place] and was seen by neurology there. They have tried her on several medications including topiramate amitriptyline and Botox injections. These were not very helpful. She was then put on Emgality. This seemed to help the most.  She also got trigger point injections at times. Patient then moved to [place]. She has been off the Emgality injections. She continues to have severe headaches. The headaches are approximately 1-3 headaches a week. They can last for several hours. Bright light and loud noise may make the headaches worse and sleeping in a quiet dark room would make it better. Patient also has scintillating scotoma. Patient also has nausea with severe headaches. No other focal neurological symptoms. Patient sister also has migraines. Patient has no systemic lupus erythematosus. Patient states that she had seizure-like episodes in [place] but was never treated as such for epilepsy.     REVIEW OF SYSTEMS    Constitutional:  []   Chills   [x]  Fatigue   []  Fevers   []  Malaise   []  Weight loss     [] Denies all of the above    Eyes:  []  Double vision   []  Blurry vision     [x] Denies all of the above    Ears, nose, mouth, throat, and face:   [] Hearing loss    []   Hoarseness      []  Snoring    []  Tinnitus       [x] Denies all of the above     Respiratory:   []  Cough    []  Shortness of breath         [x] Denies all of the above     Cardiovascular:   []  Chest pain    []  Exertional chest pressure/discomfort           [] Start date:      Quit date: 2017     Years since quittin.4    Smokeless tobacco: Former User   Substance and Sexual Activity    Alcohol use: No    Drug use: Not Currently    Sexual activity: Not on file   Other Topics Concern    Not on file   Social History Narrative    Not on file     Social Determinants of Health     Financial Resource Strain: Low Risk     Difficulty of Paying Living Expenses: Not hard at all   Food Insecurity: No Food Insecurity    Worried About [address] in the Last Year: Never true    [address] [employer] in the Last Year: Never true   Transportation Needs: No Transportation Needs    Lack of Transportation (Medical): No    Lack of Transportation (Non-Medical): No   Physical Activity:     Days of Exercise per Week:     Minutes of Exercise per Session:    Stress:     Feeling of Stress :    Social Connections:     Frequency of Communication with Friends and Family:     Frequency of Social Gatherings with Friends and Family:     Attends Religion Services:     Active Member of Clubs or Organizations:     Attends Club or Organization Meetings:     Marital Status:    Intimate Partner Violence:     Fear of Current or Ex-Partner:     Emotionally Abused:     Physically Abused:     Sexually Abused:        PHYSICAL EXAMINATION:  /80   Pulse 107   Ht 5' 6\" (1.676 m)   Wt 141 lb (64 kg)   BMI 22.76 kg/m²   Appearance: Well appearing, well nourished and in no distress  Mental Status Exam: Patient is alert, oriented to person, place and time. Recent and remote memory is normal  Fund of Knowledge is normal  Attention/concentration is normal.   Speech : No dysarthria  Language : No aphasia  Funduscopic Exam: sharp disc margins  Cranial Nerves:   II: Visual fields:  Full to confrontation  III: Pupils:  equal, round, reactive to light  III,IV,VI: Extra Ocular Movements are intact.  No nystagmus  V: Facial sensation is intact to pin prick and light touch  VII: Facial strength and movements: intact and symmetric smile,cheek puffing and eyebrow elevation  VIII: Hearing:  Intact to finger rub bilaterally  IX: Palate  elevation is symmetric  XI: Shoulder shrug is intact  XII: Tongue movements are normal  Motor:  Muscle tone and bulk are normal.   Strength is symmetrical 5/5 in all four extremities. Sensory: Intact to light touch and  pin prick in all four extremities  Coordination:  Normal  Finger to Nose and Heel to Shin bilaterally    . Reflexes:  DTR +2 and symmetric bilaterally  Plantar response: Flexor bilaterally  Gait: Gait and station is normal. Patient can toe/ heel and tandem walk without difficulty  Romberg: negative  Vascular: No carotid bruit bilaterally        DATA:  LABS:  General Labs:    CBC:   Lab Results   Component Value Date    WBC 3.2 04/05/2021    RBC 4.95 04/05/2021    HGB 13.2 04/05/2021    HCT 40.1 04/05/2021    MCV 81.0 04/05/2021    MCH 26.7 04/05/2021    MCHC 33.0 04/05/2021    RDW 16.7 04/05/2021     04/05/2021    MPV 8.5 04/05/2021     BMP:    Lab Results   Component Value Date     01/22/2021    K 3.7 01/22/2021     01/22/2021    CO2 28 01/22/2021    BUN 15 01/22/2021    LABALBU 4.1 01/22/2021    CREATININE 0.7 04/05/2021    CALCIUM 9.7 01/22/2021    GFRAA >60 04/05/2021    GFRAA >60 08/31/2011    LABGLOM >60 04/05/2021    GLUCOSE 86 01/22/2021       IMPRESSION :  Intractable migraine headaches  Nonfocal neurological examination  Patient states that she has been tried on several preventative medications for migraines in the past including topiramate amitriptyline and Botox injections without much improvement. She did well on calcitonin gene related peptide antibody drugs like Emgality.   Obstructive sleep apnea    RECOMMENDATIONS :  Discussed with patient about treatment options  Since she has tried several other medications in the past and did well with calcitonin gene related peptide inhibitor drugs I will start her on Aimovig 70

## 2021-07-08 ENCOUNTER — NURSE ONLY (OUTPATIENT)
Dept: RHEUMATOLOGY | Age: 31
End: 2021-07-08
Payer: MEDICARE

## 2021-07-08 VITALS
HEART RATE: 70 BPM | RESPIRATION RATE: 16 BRPM | TEMPERATURE: 98.2 F | BODY MASS INDEX: 22.73 KG/M2 | SYSTOLIC BLOOD PRESSURE: 128 MMHG | WEIGHT: 140.8 LBS | DIASTOLIC BLOOD PRESSURE: 70 MMHG

## 2021-07-08 DIAGNOSIS — M32.9 SYSTEMIC LUPUS ERYTHEMATOSUS, UNSPECIFIED SLE TYPE, UNSPECIFIED ORGAN INVOLVEMENT STATUS (HCC): Primary | ICD-10-CM

## 2021-07-08 DIAGNOSIS — Z79.899 HIGH RISK MEDICATIONS (NOT ANTICOAGULANTS) LONG-TERM USE: ICD-10-CM

## 2021-07-08 LAB
ALT SERPL-CCNC: 15 U/L (ref 10–40)
AST SERPL-CCNC: 21 U/L (ref 15–37)
BASOPHILS ABSOLUTE: 0 K/UL (ref 0–0.2)
BASOPHILS RELATIVE PERCENT: 0.8 %
C-REACTIVE PROTEIN: <3 MG/L (ref 0–5.1)
CREAT SERPL-MCNC: 0.7 MG/DL (ref 0.6–1.1)
EOSINOPHILS ABSOLUTE: 0.1 K/UL (ref 0–0.6)
EOSINOPHILS RELATIVE PERCENT: 3.4 %
GFR AFRICAN AMERICAN: >60
GFR NON-AFRICAN AMERICAN: >60
HCT VFR BLD CALC: 35.9 % (ref 36–48)
HEMOGLOBIN: 11.9 G/DL (ref 12–16)
LYMPHOCYTES ABSOLUTE: 1 K/UL (ref 1–5.1)
LYMPHOCYTES RELATIVE PERCENT: 27.9 %
MCH RBC QN AUTO: 27.4 PG (ref 26–34)
MCHC RBC AUTO-ENTMCNC: 33 G/DL (ref 31–36)
MCV RBC AUTO: 83.1 FL (ref 80–100)
MONOCYTES ABSOLUTE: 0.3 K/UL (ref 0–1.3)
MONOCYTES RELATIVE PERCENT: 7.9 %
NEUTROPHILS ABSOLUTE: 2.1 K/UL (ref 1.7–7.7)
NEUTROPHILS RELATIVE PERCENT: 60 %
PDW BLD-RTO: 14.9 % (ref 12.4–15.4)
PLATELET # BLD: 225 K/UL (ref 135–450)
PMV BLD AUTO: 8.7 FL (ref 5–10.5)
RBC # BLD: 4.32 M/UL (ref 4–5.2)
SEDIMENTATION RATE, ERYTHROCYTE: 58 MM/HR (ref 0–20)
WBC # BLD: 3.5 K/UL (ref 4–11)

## 2021-07-08 PROCEDURE — 96413 CHEMO IV INFUSION 1 HR: CPT | Performed by: INTERNAL MEDICINE

## 2021-07-08 PROCEDURE — 36415 COLL VENOUS BLD VENIPUNCTURE: CPT | Performed by: INTERNAL MEDICINE

## 2021-07-08 RX ORDER — SODIUM CHLORIDE 0.9 % (FLUSH) 0.9 %
10 SYRINGE (ML) INJECTION PRN
Status: CANCELLED | OUTPATIENT
Start: 2021-08-05

## 2021-07-08 RX ORDER — SODIUM CHLORIDE 9 MG/ML
INJECTION, SOLUTION INTRAVENOUS CONTINUOUS
Status: CANCELLED | OUTPATIENT
Start: 2021-08-05

## 2021-07-08 RX ORDER — EPINEPHRINE 1 MG/ML
0.3 INJECTION, SOLUTION, CONCENTRATE INTRAVENOUS PRN
Status: CANCELLED | OUTPATIENT
Start: 2021-08-05

## 2021-07-08 RX ORDER — DIPHENHYDRAMINE HYDROCHLORIDE 50 MG/ML
50 INJECTION INTRAMUSCULAR; INTRAVENOUS ONCE
Status: CANCELLED | OUTPATIENT
Start: 2021-08-05 | End: 2021-08-05

## 2021-07-08 RX ORDER — METHYLPREDNISOLONE SODIUM SUCCINATE 40 MG/ML
40 INJECTION, POWDER, LYOPHILIZED, FOR SOLUTION INTRAMUSCULAR; INTRAVENOUS ONCE
Status: CANCELLED | OUTPATIENT
Start: 2021-08-05 | End: 2021-08-05

## 2021-07-08 RX ORDER — HEPARIN SODIUM (PORCINE) LOCK FLUSH IV SOLN 100 UNIT/ML 100 UNIT/ML
500 SOLUTION INTRAVENOUS PRN
Status: CANCELLED | OUTPATIENT
Start: 2021-08-05

## 2021-07-08 RX ORDER — ACETAMINOPHEN 325 MG/1
650 TABLET ORAL ONCE
Status: CANCELLED | OUTPATIENT
Start: 2021-08-05

## 2021-07-08 RX ORDER — SODIUM CHLORIDE 0.9 % (FLUSH) 0.9 %
5 SYRINGE (ML) INJECTION PRN
Status: CANCELLED | OUTPATIENT
Start: 2021-08-05

## 2021-07-08 NOTE — PROGRESS NOTES
Pt here for benlysta infusion #6. Labs drawn today. No  Adverse effects from previous infusion. Today 140.8 lbs =64 kg   X 10 mg/kg = 640 mg, Rounded to 720 mg per MD. Therapeutic on current dose. Infusion  tolerated well. Elbow surgery scheduled 7/16/2021. Next visit scheduled  in 4 weeks.     IV Gauge: #22  IV Site: left antecubital  # of Attempts: 1  IV Start: 1045 am  IV Stop: 1200 pm - - -

## 2021-07-08 NOTE — PROGRESS NOTES
[facility] patients having surgery or anesthesia are required to be Covid tested OR to have been vaccinated at least 14 days prior to your procedure. It is very important to return our call to [phone] and notify the staff of your last vaccination date otherwise you will be required to complete Covid PCR test within the 5-6 days prior to surgery & quarantine. The results will need to be faxed to PreAdmission Testing at [phone]. PRIOR TO PROCEDURE DATE:        1. PLEASE FOLLOW ANY  GUIDELINES/ INSTRUCTIONS PRIOR TO YOUR PROCEDURE AS ADVISED BY YOUR SURGEON. 2. Arrange for someone to drive you home and be with you for the first 24 hours after discharge for your safety after your procedure for which you received sedation. Ensure it is someone we can share information with regarding your discharge. 3. You must contact your surgeon for instructions IF:   You are taking any blood thinners, aspirin, anti-inflammatory or vitamin E.   There is a change in your physical condition such as a cold, fever, rash, cuts, sores or any other infection, especially near your surgical site. 4. Do not drink alcohol the day before or day of your procedure. 5. A Pre-op History and Physical for surgery MUST be completed by your Physician or Urgent Care within 30 days of your procedure date. Please bring a copy with you on the day of your procedure and along with any other testing performed. THE DAY OF YOUR PROCEDURE:  1. Follow instructions for ARRIVAL TIME as DIRECTED BY YOUR SURGEON. 2. Enter the MAIN entrance from [address] and follow the signs to the free [employer] or [employer] parking (offered free of charge 6am-5pm). 3. Enter the Main Entrance of the hospital (do not enter from the lower level of the parking garage). Upon entrance, check in with the  at the main desk on your left. If no one is available at the desk, proceed into the [facility] Waiting Room and go through the door directly into the [facility]. There is a Check-in desk ACROSS from Room 5 (marked with a sign hanging from the ceiling). The phone number for the surgery center is [phone]. 4. Please call [phone] option #2 option #2 if you have not been preregistered yet. On the day of your procedure bring your insurance card and photo ID. You will be registered at your bedside once brought back to your room. 5. DO NOT EAT ANYTHING eight hours prior to your arrival for surgery. May have 8 ounces of water 4 hours prior to your arrival for surgery. NOTE: ALL Gastric, Bariatric and Bowel surgery patients MUST follow their surgeon's instructions. 6. MEDICATIONS    Take the following medications with a SMALL sip of water: LEVOTHROXINE. MAY HAVE LYRICA, CYMBALTA, AND AND ATARAX.  Bariatric patient's call surgeon if on diabetic medications as some need to be stopped 1 week preop   Use your usual dose of inhalers the morning of surgery. BRING your rescue inhaler with you to hospital.    Anesthesia does NOT want you to take insulin the morning of surgery. They will control your blood sugar while you are at the hospital. Please contact your ordering physician for instructions regarding your insulin the night before your procedure. If you have an insulin pump, please keep it set on basal rate. 7. Do not swallow water when brushing teeth. No gum, candy, mints or ice chips. Refrain from smoking or at least decrease the amount. 8. Dress in loose, comfortable clothing appropriate for redressing after your procedure. Do not wear jewelry (including body piercings), make-up (especially NO eye make-up), fingernail polish (NO toenail polish if foot/leg surgery), lotion, powders or metal hairclips. 9. Dentures, glasses, or contacts will need to be removed before your procedure.  Bring cases for your glasses, contacts, dentures, or hearing aids to protect them while you are in surgery. 10. If you use a CPAP, please bring it with you on the day of your procedure. 11. We recommend that valuable personal  belongings such as cash, cell phones, e-tablets or jewelry, be left at home during your stay. The hospital will not be responsible for valuables that are not secured in the hospital safe. However, if your insurance requires a co-pay, you may want to bring a method of payment, i.e. Check or credit card, if you wish to pay your co-pay the day of surgery. 12. If you are to stay overnight, you may bring a bag with personal items. Please have any large items you may need brought in by your family after your arrival to your hospital room. 15. If you have a Living Will or Durable Power of , please bring a copy on the day of your procedure. 15. With your permission, one family member may accompany you while you are being prepared for surgery. Once you are ready, additional family members may join you. HOW WE KEEP YOU SAFE and WORK TO PREVENT SURGICAL SITE INFECTIONS:  1. Health care workers should always check your ID bracelet to verify your name and birth date. You will be asked many times to state your name, date of birth, and allergies. 2. Health care workers should always clean their hands with soap or alcohol gel before providing care to you. It is okay to ask anyone if they cleaned their hands before they touch you. 3. You will be actively involved in verifying the type of procedure you are having and ensuring the correct surgical site. This will be confirmed multiple times prior to your procedure. Do NOT galileo your surgery site UNLESS instructed to by your surgeon. 4. Do not shave or wax for 72 hours prior to procedure near your operative site. Shaving with a razor can irritate your skin and make it easier to develop an infection.  On the day of your procedure, any hair that needs to EDUCATIONAL INFORMATION REVIEWED PER PHONE WITH YOU AND/OR YOUR FAMILY:  No Hibiclens® Bathing Instructions   Yes Antibacterial Soap

## 2021-07-08 NOTE — PATIENT INSTRUCTIONS
belimumab  Pronunciation:  be CASTRO ue mab  Brand:  Benlysta  What is the most important information I should know about belimumab? When this medicine is injected into a vein, tell your caregiver right away if you feel anxious, nauseated, light-headed, itchy, or short of breath. Belimumab affects your immune system. You may get infections more easily, even serious or fatal infections. Call your doctor if you have a fever, chills, cough with mucus, skin sores, warmth or redness under your skin, increased urination, or burning when you urinate. Belimumab may also cause heart problems. Call your doctor at once if you have chest pain, nausea, dizziness, sweating, and trouble breathing. Report any new or worsening mental health symptoms to your doctor, such as: depression, mood or behavior changes, trouble sleeping, or thoughts about hurting yourself or others. What is belimumab? Belimumab is a monoclonal antibody that affects the actions of the body's immune system. Monoclonal antibodies are made to target and destroy only certain cells in the body. This may help to protect healthy cells from damage. Belimumab is used together with other lupus medicines to treat active systemic lupus erythematosus (SLE). Belimumab helps decrease disease activity more than when the other medicines are used alone. Belimumab is for use in adults and children at least 11years old. Belimumab is not for use in people who have severe kidney problems caused by SLE, or have active SLE that affects the central nervous system (brain, nerves, and spinal cord). Belimumab may also be used for other purposes not listed in this medication guide. What should I discuss with my healthcare provider before using belimumab? You should not use belimumab if you are allergic to it.   Tell your doctor if you have ever had:  · an active or chronic infection;  · depression or mental illness;  · suicidal thoughts or actions;  · cancer; or  · if you are using cyclophosphamide, biologic medicines, or other monoclonal antibody medicines. Belimumab may increase your risk of certain cancers by changing the way your immune system works. Ask your doctor about your individual risk. Some people have thoughts about suicide while using belimumab. Your doctor will need to check your progress at regular visits. Your family or other caregivers should also be alert to changes in your mood or symptoms. Belimumab may affect the immune system of your baby if you use this medicine while you are pregnant. Use effective birth control to prevent pregnancy while you are using belimumab and for at least 4 months after your last dose. Tell your doctor if you become pregnant. Belimumab may affect your baby's immune system, but having SLE during pregnancy may cause complications such as worsened lupus, eclampsia (dangerously high blood pressure), premature birth, miscarriage, or growth problems in the unborn baby. SLE in the mother may also cause lupus or heart problems to develop in the . The benefit of treating SLE may outweigh any risks to the baby. If you are pregnant, your name may be listed on a pregnancy registry to track the effects of belimumab on the baby. Make sure any doctor caring for your  baby knows if you used belimumab while you were pregnant. It may not be safe to breastfeed while using this medicine. Ask your doctor about any risk. How is belimumab given? Follow all directions on your prescription label and read all medication guides or instruction sheets. Use the medicine exactly as directed. Belimumab is given as an infusion into a vein, usually every 2 to 4 weeks. A healthcare provider will give you this injection. The medicine must be given slowly, and the infusion can take about 1 hour to complete. Belimumab is also injected under the skin, usually once per week.  A healthcare provider may teach you how to properly use the medication by yourself. Do not inject this medicine into skin that is bruised, tender, red, or hard. Belimumab can be given as an intravenous infusion to adults or children. This medicine is injected under the skin only in adults. If you give injections at home, use the medicine on the same day each week. Read and carefully follow any Instructions for Use provided with your medicine. Ask your doctor or pharmacist if you don't understand all instructions. Prepare an injection only when you are ready to give it. Do not use if the medicine looks cloudy, has changed colors, or has particles in it. Call your pharmacist for new medicine. You may be given other medications to help prevent serious side effects or an allergic reaction. Keep using these medicines for as long as your doctor has prescribed. Store the prefilled syringe  or injection pen  in its original packaging in the refrigerator. Do not freeze or expose to light or high heat. Do not shake the medicine. Take the syringe or injection pen out of the refrigerator and let it reach room temperature for 30 minutes before injecting your dose. Do not use if the medicine has been left at room temperature longer than 12 hours. Do not put it back into the refrigerator. Call your pharmacist for new medicine. Each prefilled syringe or injection pen is for one use only. Throw it away after one use, even if there is still medicine left inside. Throw away used needles, syringes, or injection pens in a puncture-proof \"sharps\" container. Follow state or local laws about how to dispose of this container. Keep it out of the reach of children and pets. What happens if I miss a dose? Use the medicine as soon as you remember. You can either restart a weekly schedule based on the new injection day, or you can go back to your regular injection schedule. Do not use 2 injections on the same day.   Call your doctor for instructions if you miss an appointment for an intravenous infusion of belimumab. What happens if I overdose? Seek emergency medical attention or call the Poison Help line at 1-489.185.7064. What should I avoid while using belimumab? Avoid being near people who are sick or have infections. Tell your doctor at once if you develop signs of infection. Do not receive a \"live\" vaccine while using belimumab. The vaccine may not work as well and may not fully protect you from disease. Live vaccines include measles, mumps, rubella (MMR), rotavirus, typhoid, yellow fever, varicella (chickenpox), zoster (shingles), and nasal flu (influenza) vaccine. What are the possible side effects of belimumab? Get emergency medical help if you have signs of an allergic reaction:  hives, itching; feeling anxious or light-headed; difficult breathing; swelling of your face, lips, tongue, or throat. Some side effects may occur during the injection. Tell your caregiver if you feel anxious, nauseated, light-headed, itchy, or have trouble breathing, severe headache, or skin redness and swelling. You may get infections more easily, even serious or fatal infections. Stop using belimumab and call your doctor right away if you have signs of infection such as:  · fever, chills;  · skin sores, warmth, or redness;  · cough with mucus, chest pain, shortness of breath;  · pain or burning when you urinate;  · urinating more than usual; or  · bloody diarrhea. Belimumab may cause a serious brain infection that can lead to disability or death. Call your doctor right away if you have problems with speech, thought, vision, or muscle movement. These symptoms may start gradually and get worse quickly. Also call your doctor at once if you have:  · wheezing, chest tightness, trouble breathing; or  · new or worsening depression, anxiety, mood or behavior changes, trouble sleeping, risk-taking behavior, or thoughts about hurting yourself or others.   Common side effects may include:  · nausea, diarrhea;  · fever, sore throat, runny or stuffy nose, cough, chest tightness;  · pain, itching, redness, or swelling where the injection was given;  · pain in your arms or legs;  · headache, depressed mood; or  · sleep problems (insomnia). This is not a complete list of side effects and others may occur. Call your doctor for medical advice about side effects. You may report side effects to FDA at 7-661-VCZ-8326. What other drugs will affect belimumab? Other drugs may affect belimumab, including prescription and over-the-counter medicines, vitamins, and herbal products. Tell your doctor about all your current medicines and any medicine you start or stop using. Where can I get more information? Your doctor or pharmacist can provide more information about belimumab. Remember, keep this and all other medicines out of the reach of children, never share your medicines with others, and use this medication only for the indication prescribed. Every effort has been made to ensure that the information provided by [address] [place], [place] [address] is accurate, up-to-date, and complete, but no guarantee is made to that effect. Drug information contained herein may be time sensitive. [employer] information has been compiled for use by healthcare practitioners and consumers in the [place] and therefore [employer] does not warrant that uses outside of the [place] are appropriate, unless specifically indicated otherwise. [employer]'s drug information does not endorse drugs, diagnose patients or recommend therapy. [employer] drug information is an informational resource designed to assist licensed healthcare practitioners in caring for their patients and/or to serve consumers viewing this service as a supplement to, and not a substitute for, the expertise, skill, knowledge and judgment of healthcare practitioners.  The absence of a warning for a given drug or drug combination in no way should be construed to indicate that the drug or drug combination is safe, effective or appropriate for any given patient. [facility] does not assume any responsibility for any aspect of healthcare administered with the aid of information [facility] provides. The information contained herein is not intended to cover all possible uses, directions, precautions, warnings, drug interactions, allergic reactions, or adverse effects. If you have questions about the drugs you are taking, check with your doctor, nurse or pharmacist.  Copyright 4919-1556 [address]: 7.01. Revision date: 10/2/2020. Care instructions adapted under license by [facility] ([facility]). If you have questions about a medical condition or this instruction, always ask your healthcare professional. [place] [address] any warranty or liability for your use of this information. To avoid any disruptions in your treatment, we ask that you please notify us immediately if you have any insurance changes such as a new plan or if you might be losing coverage. If the infusion nurse does not receive your new insurance information, your infusion may be temporarily held until we can check your benefits and get an authorization from your new insurance. Please notify us BEFORE your next infusion, as it may take several weeks to check your benefits and for your new insurance to approve your continuing treatment. Please know that we want to help you in anyway we can, so notify us early! If your insurance coverage changes in ANY way you must do the follow:   Contact our office and speak with the Infusion Nurse. All calls will be returned within 24 hours, except on weekends. We will need a copy of both the front and back of your new card.  Infusion appointments will be placed on HOLD until a new Verification of Benefits and/or Prior Authorization (if required) is acquired   a. A NEW VOB (verification of benefits) must be requested to verify coverage for the medication and infusion fees.     b. If a Prior Authorization is needed, we will obtain this and provide information needed to your insurance. c. This process can take 2-3 weeks depending on insurance.  Once the new VOB has been obtained the Infusion RN or a member of the Rheumatology staff will contact you to discuss changes in coverage. A new infusion schedule can be made at this time.  Changes in insurance may disqualify you from receiving infusion coverage, however, we will do everything we can to get your treatment, as we know it is necessary for your disease and quality of living. *As a reminder, anyone on Patient Copay Assistance (Commercial insurance ONLY. This does NOT pertain to Medicare or Medicaid). You should have received information from the drug  regarding how the copay programs work. If you have not received the information, please let us know. You are responsible for sending in the Explanation of Benefits (EOBs) and [employer] for dates of service to your individual Savings Program and then following thru to ensure it was approved. If you are having difficulty understanding the program, please reach out immediately for assistance. If these are not sent in a timely manner, you will be responsible for the large portion that your insurance doesn't cover, which can be in the thousands! These charges can add up quickly and we don't want you to be stuck with large bills or your treatment be stopped for non-payment of the medication. If you have questions, please speak with a member of the Rheumatology staff. We are here to help and guide you through this infusion process and ensure that you get the treatment that you need. Thank you for letting us take care of you! Thank you for your cooperation and help! We are grateful to serve you!

## 2021-07-12 RX ORDER — LEVOTHYROXINE SODIUM 0.05 MG/1
TABLET ORAL
Qty: 90 TABLET | Refills: 0 | Status: SHIPPED | OUTPATIENT
Start: 2021-07-12 | End: 2021-12-18

## 2021-07-13 ENCOUNTER — OFFICE VISIT (OUTPATIENT)
Dept: INTERNAL MEDICINE CLINIC | Age: 31
End: 2021-07-13
Payer: MEDICARE

## 2021-07-13 VITALS
TEMPERATURE: 97.7 F | WEIGHT: 143.6 LBS | DIASTOLIC BLOOD PRESSURE: 78 MMHG | HEIGHT: 66 IN | SYSTOLIC BLOOD PRESSURE: 116 MMHG | OXYGEN SATURATION: 99 % | BODY MASS INDEX: 23.08 KG/M2 | HEART RATE: 95 BPM

## 2021-07-13 DIAGNOSIS — G40.909 SEIZURE DISORDER (HCC): Chronic | ICD-10-CM

## 2021-07-13 DIAGNOSIS — Z01.818 PREOP EXAMINATION: Primary | ICD-10-CM

## 2021-07-13 DIAGNOSIS — M32.9 SYSTEMIC LUPUS ERYTHEMATOSUS, UNSPECIFIED SLE TYPE, UNSPECIFIED ORGAN INVOLVEMENT STATUS (HCC): ICD-10-CM

## 2021-07-13 DIAGNOSIS — G56.21 CUBITAL TUNNEL SYNDROME ON RIGHT: ICD-10-CM

## 2021-07-13 PROCEDURE — 99213 OFFICE O/P EST LOW 20 MIN: CPT | Performed by: INTERNAL MEDICINE

## 2021-07-13 NOTE — PROGRESS NOTES
Preoperative Consultation      Theresa Reyes  YOB: 1990    Date of Service:  7/13/2021    Vitals:    07/13/21 0946   BP: 116/78   Site: Right Upper Arm   Pulse: 95   Temp: 97.7 °F (36.5 °C)   SpO2: 99%   Weight: 143 lb 9.6 oz (65.1 kg)   Height: 5' 6\" (1.676 m)      Wt Readings from Last 2 Encounters:   07/13/21 143 lb 9.6 oz (65.1 kg)   07/08/21 140 lb 12.8 oz (63.9 kg)     BP Readings from Last 3 Encounters:   07/13/21 116/78   07/08/21 128/70   06/30/21 119/80        Chief Complaint   Patient presents with    Pre-op Exam     elbow surgery 7/16/2021. Allergies   Allergen Reactions    Latex Hives, Itching, Rash and Swelling    Adhesive Tape     Pcn [Penicillins] Nausea And Vomiting    Nickel Other (See Comments)     rash     Outpatient Medications Marked as Taking for the 7/13/21 encounter (Office Visit) with Zoraida Jaramillo MD   Medication Sig Dispense Refill    levothyroxine (SYNTHROID) 50 MCG tablet TAKE 1 TABLET BY MOUTH EVERY DAY 90 tablet 0    clotrimazole-betamethasone (LOTRISONE) 1-0.05 % cream Apply topically 2 times daily. (Patient taking differently: as needed Apply topically 2 times daily. ) 15 g 0    pregabalin (LYRICA) 25 MG capsule Take 1 capsule by mouth 2 times daily for 92 days. 180 capsule 1    traZODone (DESYREL) 50 MG tablet Take 1 tablet by mouth nightly 90 tablet 1    DULoxetine (CYMBALTA) 60 MG extended release capsule Take 1 capsule by mouth daily 90 capsule 1    Rimegepant Sulfate (NURTEC) 75 MG TBDP Take by mouth as needed       Albuterol Sulfate (PROAIR HFA IN) Inhale 1 puff into the lungs every 4 hours as needed      hydrOXYzine (ATARAX) 50 MG tablet Take 50 mg by mouth 3 times daily as needed for Anxiety       mometasone (ELOCON) 0.1 % cream Apply topically daily.  15 g 2       This patient presents to the office today for a preoperative consultation at the request of surgeon, Dr. Pan Renteria, who plans on performing right cubital tunnel release on July 16 at [address].  The current problem started after a fall during a seizure a couple of years ago and she has had persistent pain/numbness in the right forearm/elbow. Nerve conduction testing was performed and recommended cubital tunnel release. She is active with a toddler and denies chest pain or shortness of breath exertion. She does have a history of seizures and will intermittently have seizures. She had prior carpal tunnel surgery and de Quervain's release and did well with the surgeries.     Planned anesthesia: General   Known anesthesia problems: Has had some difficulty achieving adequate anesthesia in some prior surgeries  Bleeding risk: No recent or remote history of abnormal bleeding    Patient Active Problem List   Diagnosis    Systemic lupus erythematosus ([address]    History of antiphospholipid syndrome    Migraine with aura and without status migrainosus, not intractable    De Quervain's tenosynovitis, bilateral    Cervical myelopathy (HCC)    Seizure disorder ([address]    Other specified hypothyroidism    Fibromyalgia    LEONEL (generalized anxiety disorder)    LEOLA (obstructive sleep apnea)       Past Medical History:   Diagnosis Date    Anxiety     Asthma     Bulimia     Depression     Environmental allergies     Fibromyalgia 3/17/2021    Fibromyalgia syndrome     LEONEL (generalized anxiety disorder) 3/17/2021    Gestational hypertension, antepartum     Lupus ([address]     just diagnosised with lupus last week    Other specified hypothyroidism 3/17/2021    Seizures ([address]      Past Surgical History:   Procedure Laterality Date    BLADDER SURGERY  2017    CARPAL TUNNEL RELEASE Bilateral 2020    LYMPHADENECTOMY  2012    TONSILLECTOMY  2013    WRIST SURGERY Right 2/26/2021    RIGHT WRIST DEQUERVAINS RELEASE performed by Omar Desir MD at [facility]     Family History   Problem Relation Age of Onset    Hypertension Mother     Thyroid Disease Mother     Depression Father     Sleep Apnea Father     Depression Sister     Anxiety Disorder Sister     Migraines Sister     Glaucoma Maternal Grandmother     Heart Attack Maternal Grandfather     Heart Disease Maternal Grandfather     Cancer Paternal Grandmother     Cancer Paternal Grandfather      Social History     Socioeconomic History    Marital status:      Spouse name: Not on file    Number of children: Not on file    Years of education: Not on file    Highest education level: Not on file   Occupational History    Not on file   Tobacco Use    Smoking status: Former Smoker     Packs/day: 0.20     Types: Cigarettes     Start date:      Quit date:      Years since quittin.5    Smokeless tobacco: Former User   Substance and Sexual Activity    Alcohol use: No    Drug use: Not Currently    Sexual activity: Not on file   Other Topics Concern    Not on file   Social History Narrative    Not on file     Social Determinants of Health     Financial Resource Strain: Low Risk     Difficulty of Paying Living Expenses: Not hard at all   Food Insecurity: No Food Insecurity    Worried About Running Out of Food in the Last Year: Never true    [address] in the Last Year: Never true   Transportation Needs: No Transportation Needs    Lack of Transportation (Medical): No    Lack of Transportation (Non-Medical):  No   Physical Activity:     Days of Exercise per Week:     Minutes of Exercise per Session:    Stress:     Feeling of Stress :    Social Connections:     Frequency of Communication with Friends and Family:     Frequency of Social Gatherings with Friends and Family:     Attends [employer] Services:     Active Member of Clubs or Organizations:     Attends Club or Organization Meetings:     Marital Status:    Intimate Partner Violence:     Fear of Current or Ex-Partner:     Emotionally Abused:     Physically Abused:     Sexually Abused:        Review of Systems  A comprehensive review of systems was negative except for what was noted in the HPI. Physical Exam   Constitutional: She is oriented to person, place, and time. She appears well-developed and well-nourished. No distress. HENT:   Head: Normocephalic and atraumatic. Eyes: Conjunctivae and EOM are normal.   Neck: Trachea normal and normal range of motion. Neck supple. No JVD present. Carotid bruit is not present. No mass and no thyromegaly present. Cardiovascular: Normal rate, regular rhythm, normal heart sounds and intact distal pulses. Exam reveals no gallop and no friction rub. No murmur heard. Pulmonary/Chest: Effort normal and breath sounds normal. No respiratory distress. She has no wheezes. She has no rales. Abdominal: Soft. Normal aorta and bowel sounds are normal. She exhibits no distension and no mass. There is no hepatosplenomegaly. No tenderness. Musculoskeletal: She exhibits no edema and no tenderness. Neurological: She is alert and oriented to person, place, and time. She has normal strength. No cranial nerve deficit or sensory deficit. Coordination and gait normal.   Skin: Skin is warm and dry. No rash noted. No erythema. Psychiatric: She has a normal mood and affect. Her behavior is normal.          Assessment:       28 y.o. patient with planned surgery as above. Known risk factors for perioperative complications: seizure disorder  Current medications which may produce withdrawal symptoms if withheld perioperatively: none      Plan:     1. Preop examination  - Preoperative workup as follows: none  - Change in medication regimen before surgery: Hold all medications on morning of surgery  - Prophylaxis for cardiac events with perioperative beta-blockers: Not indicated  - No contraindications to planned surgery    2. Cubital tunnel syndrome on right  - persistent symptoms, will undergo surgery    3.  Systemic lupus erythematosus, unspecified SLE type, unspecified organ involvement status ([facility]  - seeing rheumatologist, on benlysta    4.  Seizure disorder ([facility]  - stable

## 2021-07-15 ENCOUNTER — ANESTHESIA EVENT (OUTPATIENT)
Dept: OPERATING ROOM | Age: 31
End: 2021-07-15
Payer: MEDICARE

## 2021-07-15 NOTE — PROGRESS NOTES
[facility] / [facility] ([facility]) [address] [facility], [address]    Acknowledgment of Informed Consent for Surgical or Medical Procedure and Sedation  I agree to allow doctor(s) [facility] and his/her associates or assistants, including residents and/or other qualified medical practitioner to perform the following medical treatment or procedure and to administer or direct the administration of sedation as necessary:  Procedure(s): RIGHT IN SITU CUBITAL [address]      My doctor has explained the following regarding the proposed procedure:   the explanation of the procedure   the benefits of the procedure   the potential problems that might occur during recuperation   the risks and side effects of the procedure which could include but are not limited to severe blood loss, infection, stroke or death   the benefits, risks and side effect of alternative procedures including the consequences of declining this procedure or any alternative procedures   the likelihood of achieving satisfactory results. I acknowledge no guarantee or assurance has been made to me regarding the results. I understand that during the course of this treatment/procedure, unforeseen conditions can occur which require an additional or different procedure. I agree to allow my physician or assistants to perform such extension of the original procedure as they may find necessary. I understand that sedation will often result in temporary impairment of memory and fine motor skills and that sedation can occasionally progress to a state of deep sedation or general anesthesia. I understand the risks of anesthesia for surgery include, but are not limited to, sore throat, hoarseness, injury to face, mouth, or teeth; nausea; headache; injury to blood vessels or nerves; death, brain damage, or paralysis.     I understand that if I have a Limitation of Treatment order in

## 2021-07-16 ENCOUNTER — ANESTHESIA (OUTPATIENT)
Dept: OPERATING ROOM | Age: 31
End: 2021-07-16
Payer: MEDICARE

## 2021-07-16 ENCOUNTER — HOSPITAL ENCOUNTER (OUTPATIENT)
Age: 31
Setting detail: OUTPATIENT SURGERY
Discharge: HOME | End: 2021-07-16
Attending: ORTHOPAEDIC SURGERY | Admitting: ORTHOPAEDIC SURGERY
Payer: MEDICARE

## 2021-07-16 VITALS
OXYGEN SATURATION: 95 % | TEMPERATURE: 97.3 F | WEIGHT: 141 LBS | BODY MASS INDEX: 22.66 KG/M2 | SYSTOLIC BLOOD PRESSURE: 116 MMHG | RESPIRATION RATE: 16 BRPM | HEIGHT: 66 IN | DIASTOLIC BLOOD PRESSURE: 77 MMHG | HEART RATE: 84 BPM

## 2021-07-16 VITALS — DIASTOLIC BLOOD PRESSURE: 61 MMHG | OXYGEN SATURATION: 100 % | SYSTOLIC BLOOD PRESSURE: 101 MMHG

## 2021-07-16 DIAGNOSIS — G56.21 CUBITAL TUNNEL SYNDROME ON RIGHT: Primary | ICD-10-CM

## 2021-07-16 LAB — PREGNANCY, URINE: NEGATIVE

## 2021-07-16 PROCEDURE — 7100000010 HC PHASE II RECOVERY - FIRST 15 MIN: Performed by: ORTHOPAEDIC SURGERY

## 2021-07-16 PROCEDURE — 6370000000 HC RX 637 (ALT 250 FOR IP): Performed by: ORTHOPAEDIC SURGERY

## 2021-07-16 PROCEDURE — 3700000001 HC ADD 15 MINUTES (ANESTHESIA): Performed by: ORTHOPAEDIC SURGERY

## 2021-07-16 PROCEDURE — 3600000004 HC SURGERY LEVEL 4 BASE: Performed by: ORTHOPAEDIC SURGERY

## 2021-07-16 PROCEDURE — 2709999900 HC NON-CHARGEABLE SUPPLY: Performed by: ORTHOPAEDIC SURGERY

## 2021-07-16 PROCEDURE — 7100000000 HC PACU RECOVERY - FIRST 15 MIN: Performed by: ORTHOPAEDIC SURGERY

## 2021-07-16 PROCEDURE — 3700000000 HC ANESTHESIA ATTENDED CARE: Performed by: ORTHOPAEDIC SURGERY

## 2021-07-16 PROCEDURE — 3600000014 HC SURGERY LEVEL 4 ADDTL 15MIN: Performed by: ORTHOPAEDIC SURGERY

## 2021-07-16 PROCEDURE — 2580000003 HC RX 258: Performed by: ANESTHESIOLOGY

## 2021-07-16 PROCEDURE — 2580000003 HC RX 258: Performed by: ORTHOPAEDIC SURGERY

## 2021-07-16 PROCEDURE — 6360000002 HC RX W HCPCS: Performed by: NURSE ANESTHETIST, CERTIFIED REGISTERED

## 2021-07-16 PROCEDURE — 2500000003 HC RX 250 WO HCPCS: Performed by: ORTHOPAEDIC SURGERY

## 2021-07-16 PROCEDURE — 7100000011 HC PHASE II RECOVERY - ADDTL 15 MIN: Performed by: ORTHOPAEDIC SURGERY

## 2021-07-16 PROCEDURE — 6360000002 HC RX W HCPCS: Performed by: ANESTHESIOLOGY

## 2021-07-16 PROCEDURE — 84703 CHORIONIC GONADOTROPIN ASSAY: CPT

## 2021-07-16 PROCEDURE — 7100000001 HC PACU RECOVERY - ADDTL 15 MIN: Performed by: ORTHOPAEDIC SURGERY

## 2021-07-16 RX ORDER — PROMETHAZINE HYDROCHLORIDE 25 MG/ML
6.25 INJECTION, SOLUTION INTRAMUSCULAR; INTRAVENOUS
Status: DISCONTINUED | OUTPATIENT
Start: 2021-07-16 | End: 2021-07-16 | Stop reason: HOSPADM

## 2021-07-16 RX ORDER — GINSENG 100 MG
CAPSULE ORAL PRN
Status: DISCONTINUED | OUTPATIENT
Start: 2021-07-16 | End: 2021-07-16 | Stop reason: ALTCHOICE

## 2021-07-16 RX ORDER — ONDANSETRON 2 MG/ML
INJECTION INTRAMUSCULAR; INTRAVENOUS PRN
Status: DISCONTINUED | OUTPATIENT
Start: 2021-07-16 | End: 2021-07-16 | Stop reason: SDUPTHER

## 2021-07-16 RX ORDER — OXYCODONE HYDROCHLORIDE AND ACETAMINOPHEN 5; 325 MG/1; MG/1
1 TABLET ORAL
Status: DISCONTINUED | OUTPATIENT
Start: 2021-07-16 | End: 2021-07-16 | Stop reason: HOSPADM

## 2021-07-16 RX ORDER — FENTANYL CITRATE 50 UG/ML
INJECTION, SOLUTION INTRAMUSCULAR; INTRAVENOUS PRN
Status: DISCONTINUED | OUTPATIENT
Start: 2021-07-16 | End: 2021-07-16 | Stop reason: SDUPTHER

## 2021-07-16 RX ORDER — SODIUM CHLORIDE 0.9 % (FLUSH) 0.9 %
10 SYRINGE (ML) INJECTION PRN
Status: DISCONTINUED | OUTPATIENT
Start: 2021-07-16 | End: 2021-07-16 | Stop reason: HOSPADM

## 2021-07-16 RX ORDER — CLINDAMYCIN PHOSPHATE 900 MG/50ML
900 INJECTION INTRAVENOUS ONCE
Status: COMPLETED | OUTPATIENT
Start: 2021-07-16 | End: 2021-07-16

## 2021-07-16 RX ORDER — HYDROCODONE BITARTRATE AND ACETAMINOPHEN 7.5; 325 MG/1; MG/1
1 TABLET ORAL
Status: COMPLETED | OUTPATIENT
Start: 2021-07-16 | End: 2021-07-16

## 2021-07-16 RX ORDER — ONDANSETRON 2 MG/ML
4 INJECTION INTRAMUSCULAR; INTRAVENOUS
Status: DISCONTINUED | OUTPATIENT
Start: 2021-07-16 | End: 2021-07-16 | Stop reason: HOSPADM

## 2021-07-16 RX ORDER — MAGNESIUM HYDROXIDE 1200 MG/15ML
LIQUID ORAL CONTINUOUS PRN
Status: COMPLETED | OUTPATIENT
Start: 2021-07-16 | End: 2021-07-16

## 2021-07-16 RX ORDER — DEXAMETHASONE SODIUM PHOSPHATE 4 MG/ML
INJECTION, SOLUTION INTRA-ARTICULAR; INTRALESIONAL; INTRAMUSCULAR; INTRAVENOUS; SOFT TISSUE PRN
Status: DISCONTINUED | OUTPATIENT
Start: 2021-07-16 | End: 2021-07-16 | Stop reason: SDUPTHER

## 2021-07-16 RX ORDER — MEPERIDINE HYDROCHLORIDE 25 MG/ML
12.5 INJECTION INTRAMUSCULAR; INTRAVENOUS; SUBCUTANEOUS EVERY 5 MIN PRN
Status: DISCONTINUED | OUTPATIENT
Start: 2021-07-16 | End: 2021-07-16 | Stop reason: HOSPADM

## 2021-07-16 RX ORDER — HYDRALAZINE HYDROCHLORIDE 20 MG/ML
5 INJECTION INTRAMUSCULAR; INTRAVENOUS EVERY 10 MIN PRN
Status: DISCONTINUED | OUTPATIENT
Start: 2021-07-16 | End: 2021-07-16 | Stop reason: HOSPADM

## 2021-07-16 RX ORDER — HYDROCODONE BITARTRATE AND ACETAMINOPHEN 5; 325 MG/1; MG/1
1 TABLET ORAL EVERY 6 HOURS PRN
Qty: 14 TABLET | Refills: 0 | Status: SHIPPED | OUTPATIENT
Start: 2021-07-16 | End: 2021-07-21

## 2021-07-16 RX ORDER — SODIUM CHLORIDE 9 MG/ML
25 INJECTION, SOLUTION INTRAVENOUS PRN
Status: DISCONTINUED | OUTPATIENT
Start: 2021-07-16 | End: 2021-07-16 | Stop reason: HOSPADM

## 2021-07-16 RX ORDER — SODIUM CHLORIDE 0.9 % (FLUSH) 0.9 %
10 SYRINGE (ML) INJECTION EVERY 12 HOURS SCHEDULED
Status: DISCONTINUED | OUTPATIENT
Start: 2021-07-16 | End: 2021-07-16 | Stop reason: HOSPADM

## 2021-07-16 RX ORDER — SODIUM CHLORIDE, SODIUM LACTATE, POTASSIUM CHLORIDE, CALCIUM CHLORIDE 600; 310; 30; 20 MG/100ML; MG/100ML; MG/100ML; MG/100ML
INJECTION, SOLUTION INTRAVENOUS CONTINUOUS
Status: DISCONTINUED | OUTPATIENT
Start: 2021-07-16 | End: 2021-07-16 | Stop reason: HOSPADM

## 2021-07-16 RX ORDER — FENTANYL CITRATE 50 UG/ML
50 INJECTION, SOLUTION INTRAMUSCULAR; INTRAVENOUS EVERY 5 MIN PRN
Status: DISCONTINUED | OUTPATIENT
Start: 2021-07-16 | End: 2021-07-16 | Stop reason: HOSPADM

## 2021-07-16 RX ORDER — FENTANYL CITRATE 50 UG/ML
25 INJECTION, SOLUTION INTRAMUSCULAR; INTRAVENOUS EVERY 5 MIN PRN
Status: DISCONTINUED | OUTPATIENT
Start: 2021-07-16 | End: 2021-07-16 | Stop reason: HOSPADM

## 2021-07-16 RX ORDER — LABETALOL HYDROCHLORIDE 5 MG/ML
5 INJECTION, SOLUTION INTRAVENOUS EVERY 10 MIN PRN
Status: DISCONTINUED | OUTPATIENT
Start: 2021-07-16 | End: 2021-07-16 | Stop reason: HOSPADM

## 2021-07-16 RX ADMIN — HYDROCODONE BITARTRATE AND ACETAMINOPHEN 1 TABLET: 7.5; 325 TABLET ORAL at 11:19

## 2021-07-16 RX ADMIN — MEPERIDINE HYDROCHLORIDE 12.5 MG: 25 INJECTION INTRAMUSCULAR; INTRAVENOUS; SUBCUTANEOUS at 10:59

## 2021-07-16 RX ADMIN — SODIUM CHLORIDE, POTASSIUM CHLORIDE, SODIUM LACTATE AND CALCIUM CHLORIDE: 600; 310; 30; 20 INJECTION, SOLUTION INTRAVENOUS at 10:21

## 2021-07-16 RX ADMIN — CLINDAMYCIN PHOSPHATE 900 MG: 900 INJECTION, SOLUTION INTRAVENOUS at 09:10

## 2021-07-16 RX ADMIN — ONDANSETRON 4 MG: 2 INJECTION INTRAMUSCULAR; INTRAVENOUS at 09:16

## 2021-07-16 RX ADMIN — SODIUM CHLORIDE, POTASSIUM CHLORIDE, SODIUM LACTATE AND CALCIUM CHLORIDE: 600; 310; 30; 20 INJECTION, SOLUTION INTRAVENOUS at 08:17

## 2021-07-16 RX ADMIN — HYDROMORPHONE HYDROCHLORIDE 0.25 MG: 1 INJECTION, SOLUTION INTRAMUSCULAR; INTRAVENOUS; SUBCUTANEOUS at 11:07

## 2021-07-16 RX ADMIN — FENTANYL CITRATE 50 MCG: 50 INJECTION, SOLUTION INTRAMUSCULAR; INTRAVENOUS at 09:25

## 2021-07-16 RX ADMIN — HYDROMORPHONE HYDROCHLORIDE 0.25 MG: 1 INJECTION, SOLUTION INTRAMUSCULAR; INTRAVENOUS; SUBCUTANEOUS at 11:02

## 2021-07-16 RX ADMIN — DEXAMETHASONE SODIUM PHOSPHATE 4 MG: 4 INJECTION, SOLUTION INTRAMUSCULAR; INTRAVENOUS at 09:14

## 2021-07-16 ASSESSMENT — PULMONARY FUNCTION TESTS
PIF_VALUE: 3
PIF_VALUE: 1
PIF_VALUE: 3
PIF_VALUE: 2
PIF_VALUE: 3
PIF_VALUE: 2
PIF_VALUE: 0
PIF_VALUE: 4
PIF_VALUE: 2
PIF_VALUE: 3
PIF_VALUE: 21
PIF_VALUE: 3
PIF_VALUE: 24
PIF_VALUE: 3
PIF_VALUE: 23
PIF_VALUE: 3
PIF_VALUE: 3
PIF_VALUE: 2
PIF_VALUE: 0
PIF_VALUE: 3
PIF_VALUE: 0
PIF_VALUE: 3
PIF_VALUE: 2
PIF_VALUE: 3
PIF_VALUE: 3
PIF_VALUE: 0
PIF_VALUE: 3
PIF_VALUE: 2
PIF_VALUE: 3
PIF_VALUE: 0
PIF_VALUE: 3

## 2021-07-16 ASSESSMENT — PAIN DESCRIPTION - PAIN TYPE: TYPE: SURGICAL PAIN

## 2021-07-16 ASSESSMENT — PAIN SCALES - GENERAL
PAINLEVEL_OUTOF10: 6
PAINLEVEL_OUTOF10: 5
PAINLEVEL_OUTOF10: 7
PAINLEVEL_OUTOF10: 0
PAINLEVEL_OUTOF10: 5
PAINLEVEL_OUTOF10: 6

## 2021-07-16 ASSESSMENT — PAIN DESCRIPTION - LOCATION: LOCATION: ARM

## 2021-07-16 ASSESSMENT — PAIN DESCRIPTION - ORIENTATION: ORIENTATION: RIGHT

## 2021-07-16 ASSESSMENT — PAIN - FUNCTIONAL ASSESSMENT: PAIN_FUNCTIONAL_ASSESSMENT: 0-10

## 2021-07-16 NOTE — BRIEF OP NOTE
Brief Postoperative Note      Patient: Lizbeth Muhammad  YOB: 1990  MRN: [id number]    Date of Procedure: 7/16/2021    Pre-Op Diagnosis: Cubital tunnel syndrome on right [G56.21]    Post-Op Diagnosis: Same       Procedure:   1.  Right elbow cubital tunnel release with anterior ulnar nerve transposition at the elbow  Surgeon(s):  Yue Tobar MD    Assistant:  First Assistant: Stephanie Berger    Anesthesia: General, local    Estimated Blood Loss (mL): 5ml    Complications: None immediate apparent    Specimens:   none  Implants:  none  Drains: none    Findings: see fully dictated operative report    Electronically signed by Yevonne Goodpasture, MD on 7/16/2021 at 10:24 AM

## 2021-07-16 NOTE — H&P
Chris Query    [id number]    [facility] Same Day Surgery Update H & P  Department of General Surgery   Surgical Service   Pre-operative History and Physical  Last H & P within the last 30 days. DIAGNOSIS:   Cubital tunnel syndrome on right [G56.21]    Procedure(s):  RIGHT IN SITU CUBITAL TUNNEL RELEASE VERSUS ANTERIOR NERVE TRANSPOSITION AT THE ELBOW     HISTORY OF PRESENT ILLNESS:   Patient with right 4th and 5th digit pain, numbness and tingling in the setting of EMG demonstrated ulnar nerve compression. Covid 19:  Patient denies fever, chills, cough or known exposure to Covid-19.        Past Medical History:        Diagnosis Date    Anxiety     Asthma     Bulimia     Depression     Environmental allergies     Fibromyalgia 3/17/2021    Fibromyalgia syndrome     LEONEL (generalized anxiety disorder) 3/17/2021    Gestational hypertension, antepartum     Lupus ([address]     just diagnosised with lupus last week    Other specified hypothyroidism 3/17/2021    Seizures ([facility]      Past Surgical History:        Procedure Laterality Date    BLADDER SURGERY  2017    CARPAL TUNNEL RELEASE Bilateral 2020    LYMPHADENECTOMY  2012    TONSILLECTOMY  2013    WRIST SURGERY Right 2021    RIGHT WRIST DEQUERVAINS RELEASE performed by Yadi Ospina MD at [facility] OR     Past Social History:  Social History     Socioeconomic History    Marital status:      Spouse name: None    Number of children: None    Years of education: None    Highest education level: None   Occupational History    None   Tobacco Use    Smoking status: Former Smoker     Packs/day: 0.20     Types: Cigarettes     Start date:      Quit date: 2017     Years since quittin.5    Smokeless tobacco: Former User   Substance and Sexual Activity    Alcohol use: No    Drug use: Not Currently    Sexual activity: None   Other Topics Concern    None   Social History Narrative    None     Social Determinants of Health Financial Resource Strain: Low Risk     Difficulty of Paying Living Expenses: Not hard at all   Food Insecurity: No Food Insecurity    Worried About Running Out of Food in the Last Year: Never true    Danny of Food in the Last Year: Never true   Transportation Needs: No Transportation Needs    Lack of Transportation (Medical): No    Lack of Transportation (Non-Medical): No   Physical Activity:     Days of Exercise per Week:     Minutes of Exercise per Session:    Stress:     Feeling of Stress :    Social Connections:     Frequency of Communication with Friends and Family:     Frequency of Social Gatherings with Friends and Family:     Attends Advent Services:     Active Member of Clubs or Organizations:     Attends Club or Organization Meetings:     Marital Status:    Intimate Partner Violence:     Fear of Current or Ex-Partner:     Emotionally Abused:     Physically Abused:     Sexually Abused:          Medications Prior to Admission:      Prior to Admission medications    Medication Sig Start Date End Date Taking? Authorizing Provider   clotrimazole-betamethasone (LOTRISONE) 1-0.05 % cream Apply topically 2 times daily. Patient taking differently: as needed Apply topically 2 times daily. 5/21/21  Yes Dianne Murray MD   pregabalin (LYRICA) 25 MG capsule Take 1 capsule by mouth 2 times daily for 92 days.  5/3/21 8/3/21 Yes Ewa Shelby MD   traZODone (DESYREL) 50 MG tablet Take 1 tablet by mouth nightly 3/4/21  Yes Dianne Murray MD   DULoxetine (CYMBALTA) 60 MG extended release capsule Take 1 capsule by mouth daily 2/17/21  Yes Dianne Murray MD   Rimegepant Sulfate (NURTEC) 75 MG TBDP Take by mouth as needed    Yes Historical Provider, MD   Albuterol Sulfate (PROAIR HFA IN) Inhale 1 puff into the lungs every 4 hours as needed   Yes Historical Provider, MD   hydrOXYzine (ATARAX) 50 MG tablet Take 50 mg by mouth 3 times daily as needed for Anxiety    Yes Historical Provider, MD pichardosone (ELOCON) 0.1 % cream Apply topically daily. 1/5/21  Yes Soraida Min MD   levothyroxine (SYNTHROID) 50 MCG tablet TAKE 1 TABLET BY MOUTH EVERY DAY 7/12/21   Suraj Silva MD   Erenumab-aooe 70 MG/ML SOAJ Inject 1 cc (70 mg) subcutaneously every 30 days  Patient not taking: Reported on 7/13/2021 6/30/21   Tatiana Cota MD         Allergies:  Latex, Adhesive tape, Pcn [penicillins], and Nickel    PHYSICAL EXAM:      /84   Pulse 105   Temp 98.2 °F (36.8 °C) (Oral)   Resp 16   Ht 5' 6\" (1.676 m)   Wt 141 lb (64 kg)   LMP 06/18/2021   SpO2 99%   BMI 22.76 kg/m²      Airway:  Airway patent with no audible stridor    Heart:  Regular rate and rhythm, No murmur noted    Lungs:  No increased work of breathing, good air exchange, clear to auscultation bilaterally, no crackles or wheezing    Abdomen:  Soft, non-distended, non-tender, no rebound tenderness or guarding, and no masses palpated    ASSESSMENT AND PLAN     Patient is a 28 y.o. female with above specified procedure planned. 1.  The patients history and physical was obtained and signed off by the pre-admission testing department. Patient seen and focused exam done today- no new changes since last physical exam on 7/13/21    2. Access to ancillary services are available per request of the provider.     Rex Hu, APRN - CNP     7/16/2021

## 2021-07-16 NOTE — PROGRESS NOTES
Patient to pacu 16 s/p RIGHT ANTERIOR NERVE TRANSPOSITION AT THE ELBOW - Right, report received from CRNA, reported hemodynamically stable intra op.  All vitals stable upon arrival

## 2021-07-16 NOTE — ANESTHESIA PRE PROCEDURE
Department of Anesthesiology  Preprocedure Note       Name:  Denise Stevens   Age:  28 y.o.  :  1990                                          MRN:  [id number]         Date:  2021      Surgeon: Tracee Navarro):  Angela Mclaughlin MD    Procedure: Procedure(s):  RIGHT IN SITU CUBITAL TUNNEL RELEASE VERSUS ANTERIOR NERVE TRANSPOSITION AT THE ELBOW    Medications prior to admission:   Prior to Admission medications    Medication Sig Start Date End Date Taking? Authorizing Provider   levothyroxine (SYNTHROID) 50 MCG tablet TAKE 1 TABLET BY MOUTH EVERY DAY 21   Katia Ramey MD   Erenumab-aooe 70 MG/ML SOAJ Inject 1 cc (70 mg) subcutaneously every 30 days 21   Amy Gibson MD   clotrimazole-betamethasone (LOTRISONE) 1-0.05 % cream Apply topically 2 times daily. Patient taking differently: as needed Apply topically 2 times daily. 21   Milagro Pryor MD   pregabalin (LYRICA) 25 MG capsule Take 1 capsule by mouth 2 times daily for 92 days. 5/3/21 8/3/21  King Marcelino MD   traZODone (DESYREL) 50 MG tablet Take 1 tablet by mouth nightly 3/4/21   Milagro Pryor MD   DULoxetine (CYMBALTA) 60 MG extended release capsule Take 1 capsule by mouth daily 21   Milagro Pryor MD   Rimegepant Sulfate (NURTEC) 75 MG TBDP Take by mouth as needed     Historical Provider, MD   Albuterol Sulfate (PROAIR HFA IN) Inhale 1 puff into the lungs every 4 hours as needed    Historical Provider, MD   hydrOXYzine (ATARAX) 50 MG tablet Take 50 mg by mouth 3 times daily as needed for Anxiety     Historical Provider, MD   mometasone (ELOCON) 0.1 % cream Apply topically daily. 21   Milagro Pryor MD       Current medications:    No current facility-administered medications for this visit. No current outpatient medications on file.      Facility-Administered Medications Ordered in Other Visits   Medication Dose Route Frequency Provider Last Rate Last Admin    clindamycin (CLEOCIN) 900 mg in dextrose 5 % 50 mL IVPB  900 mg Intravenous Once Angela Mclaughlin MD        lactated ringers infusion   Intravenous Continuous Larina Chum,  mL/hr at 07/16/21 0817 New Bag at 07/16/21 0817    sodium chloride flush 0.9 % injection 10 mL  10 mL Intravenous 2 times per day Larina Chum, DO        sodium chloride flush 0.9 % injection 10 mL  10 mL Intravenous PRN Larina Chum, DO        0.9 % sodium chloride infusion  25 mL Intravenous PRN Larina Chum, DO           Allergies: Allergies   Allergen Reactions    Latex Hives, Itching, Rash and Swelling    Adhesive Tape     Pcn [Penicillins] Nausea And Vomiting    Bee Pollen      Other reaction(s): Other (See Comments)  Sneezing, congestion    Glucosamine      Pt states she is not allergic to shellfish.  Nickel Other (See Comments)     rash    Other      Other reaction(s): Other (See Comments)  Sneezing, congestion, rash       Problem List:    Patient Active Problem List   Diagnosis Code    Systemic lupus erythematosus ([facility] [address] M32.9    History of antiphospholipid syndrome Z86.2    Migraine with aura and without status migrainosus, not intractable G43. [address] Quervain's tenosynovitis, bilateral M65.4    Cervical myelopathy (HCC) G95.9    Seizure disorder ([address] G40.909    Other specified hypothyroidism E03.8    Fibromyalgia M79.7    LEONEL (generalized anxiety disorder) F41.1    LEOLA (obstructive sleep apnea) G47.33       Past Medical History:        Diagnosis Date    Anxiety     Asthma     Bulimia     Depression     Environmental allergies     Fibromyalgia 3/17/2021    Fibromyalgia syndrome     LEONEL (generalized anxiety disorder) 3/17/2021    Gestational hypertension, antepartum     Lupus ([address]     just diagnosised with lupus last week    Other specified hypothyroidism 3/17/2021    Seizures ([address]        Past Surgical History:        Procedure Laterality Date    BLADDER SURGERY  2017    CARPAL TUNNEL RELEASE Bilateral 2020    LYMPHADENECTOMY  2012  TONSILLECTOMY  2013    WRIST SURGERY Right 2021    RIGHT WRIST DEQUERVAINS RELEASE performed by Chin Chavez MD at [address] History:    Social History     Tobacco Use    Smoking status: Former Smoker     Packs/day: 0.20     Types: Cigarettes     Start date:      Quit date:      Years since quittin.5    Smokeless tobacco: Former User   Substance Use Topics    Alcohol use: No                                Counseling given: Not Answered      Vital Signs (Current): There were no vitals filed for this visit. BP Readings from Last 3 Encounters:   21 122/84   21 116/78   21 128/70       NPO Status:                                                                                 BMI:   Wt Readings from Last 3 Encounters:   21 141 lb (64 kg)   21 143 lb 9.6 oz (65.1 kg)   21 140 lb 12.8 oz (63.9 kg)     There is no height or weight on file to calculate BMI.    CBC:   Lab Results   Component Value Date    WBC 3.5 2021    RBC 4.32 2021    HGB 11.9 2021    HCT 35.9 2021    MCV 83.1 2021    RDW 14.9 2021     2021       CMP:   Lab Results   Component Value Date     2021    K 3.7 2021     2021    CO2 28 2021    BUN 15 2021    CREATININE 0.7 2021    GFRAA >60 2021    GFRAA >60 2011    AGRATIO 1.7 2021    LABGLOM >60 2021    GLUCOSE 86 2021    PROT 6.5 2021    CALCIUM 9.7 2021    BILITOT 0.4 2021    ALKPHOS 79 2021    AST 21 2021    ALT 15 2021       POC Tests: No results for input(s): POCGLU, POCNA, POCK, POCCL, POCBUN, POCHEMO, POCHCT in the last 72 hours.     Coags: No results found for: PROTIME, INR, APTT    HCG (If Applicable):   Lab Results   Component Value Date    PREGTESTUR Negative 2021        ABGs: No results found for: PHART, PO2ART,

## 2021-07-16 NOTE — H&P
I have reviewed the history and physical and examined the patient and find no relevant changes. I have reviewed with the patient and/or family the risks, benefits, and alternatives to the procedure.     Allyson Alcazar MD  7/16/2021

## 2021-07-16 NOTE — PROGRESS NOTES
1140 pt resting quietly, no problems noted, right arm ace/splint clean dry and intact, right hand fingers warm and mobile, ice to right arm. pt asking for IV to be d/c'd so she can get dressed. VSS, pt drinking fluids. R arm sling in place. 1150 IV removed. 1200 pt finished dressing, and calling mom for ride home. 26 mom outside, pt to w/c to car. D/C instructions given to mom at car side. Pt d/c'd. Ambulatory Surgery/Procedure Discharge Note    Vitals:    07/16/21 1132   BP: 116/77   Pulse: 84   Resp: 16   Temp: 97.3 °F (36.3 °C)   SpO2: 95%       In: 1225 [P.O.:100; I.V.:1125]  Out: -     Restroom use offered before discharge. Yes    Pain assessment:  present - adequately treated  Pain Level: 5        Patient discharged to home/self care.  Patient discharged via wheel chair by transporter to waiting family/S.O.       7/16/2021 12:27 PM

## 2021-07-16 NOTE — OP NOTE
Patient:  Lynnette Blunt  Date of Surgery:  7/16/2021    Pre-Op Diagnoses:  1.  right cubital tunnel syndrome  2.          3.            Post-op Diagnoses:   1.  same  2. Procedure(s) Performed:  1.  right  cubital tunnel release with anterior ulnar nerve transposition at the elbow  2.         3.             Anesthesia Type:   General, local    Blood Loss:   5ml    Pathology:   None    Complications:   None immediate apparent    Assistant:  [facility]      Informed consent was on the chart. Surgical site marked by Dr Erika Candelaria in preop holding. All risks and benefits were discussed once again day of surgery, patient desired to proceed. The patient was brought to the operating and room and placed in the supine position.   After the induction of anesthesia a standard time-out was performed.     Description of the Procedure:     We verified that antibiotics had been given.  The right  upper extremity was prepped and draped in normal sterile fashion.  Final timeout was held.  The upper extremity was exsanguinated and the tourniquet was inflated to 250 mmHg.        .A standard 8-9 cm curvilinear incision was made just behind the medial epicondyle, through skin only.  Meticulous hemostasis.  Transversing sensory branches to the medial elbow and medial forearm were identified and protected.  Alejandra's ligament and the cubital tunnel were identified and isolated.  Alejandra's ligament was released completely under direct visualization while protecting the underlying nerve.  It was quite tight at Alejandra's ligament.  The main finding of the nerve at this time was clear subluxation and perching of the ulnar nerve out of the cubital tunnel. There was a subtle hourglass shape to the nerve but the nerve appeared to have normal coloring.  Nerve was completely released proximally.  Release was taken distally to the FCU fascia.  The deep and superficial FCU fascial layers were released under direct visualization carefully protecting the small nerve branches.  The nerve was nicely decompressed at this time.  A small vessel running along the nerve was well filled.    The elbow was placed through a range of motion and the subluxation was evident.  I felt that transposition was necessary. I performed a complete meticulous dissection around the ulnar nerve again preserving the blood supply to the nerve. Several branches of the nerve were transected near the FCU muscle in order to allow for transposition. A vessel loop was placed around the nerve to carefully mobilize while dissection was performed. A portion of the posteior intermuscular septum was then excised, taking care to protect surrounding neurovascular structures. Once the nerve was adequately mobilized, a fascial sling was created using two flap of fascia just anterior to the medial epicondyle. These was carefully  from the underlying muscle. The ulnar nerve was then transposed anteriorly and the flaps were closed upon each other with the nerve lying loosely underneath. NO evidence of compression of the nerve. The elbow was carefully taken through range of motion and the nerve appearing to be gliding smoothly with no evidence of kinking or entrapment and yet secure anterior to the epicondyle.   .  Wound was copiously irrigated.  No other abnormalities noted.  Skin was closed with interrupted nylon and vicyl suture.       .Tourniquet was deflated and all fingers were warm and well perfused. Soft  sterile dressing and posterior long arm splint was placed on the upper extremity.  Patient was awoken from sedation, tolerated the case well, was taken to the post anesthesia recovery unit in good condition.  No complications.     Findings:  Compression ulnar nerve at the elbow     Intervention:  1% Xylocaine, 0.25% Marcaine, without epinephrine as local injection     Other Notes:   Follow-up in 7-10 days for wound inspection and suture removal.  Progressive range of motion of the elbow, wrist, and hand.  Progressive activities.  Referral to the hand therapist for a cubital  tunnel program if necessary , including scar treatments.         Meliza Cheatham MD    Hand & Upper Extremity Surgery  [address]

## 2021-07-16 NOTE — ANESTHESIA POSTPROCEDURE EVALUATION
Department of Anesthesiology  Postprocedure Note    Patient: Junior Larios  MRN: [id number]  YOB: 1990  Date of evaluation: 7/16/2021  Time:  1:04 PM     Procedure Summary     Date: 07/16/21 Room / Location: [address] [place] [address]    Anesthesia Start: 5409 Anesthesia Stop: 4976    Procedure: RIGHT ANTERIOR NERVE TRANSPOSITION AT THE ELBOW (Right ) Diagnosis:       Cubital tunnel syndrome on right      (Cubital tunnel syndrome on right [G56.21])    Surgeons: Joelle Carlson MD Responsible Provider: Kenneth Salinas DO    Anesthesia Type: general ASA Status: 3          Anesthesia Type: general    Crow Phase I: Crow Score: 8    Crow Phase II: Crow Score: 10    Last vitals: Reviewed and per EMR flowsheets.        Anesthesia Post Evaluation    Patient location during evaluation: PACU  Patient participation: complete - patient participated  Level of consciousness: awake and alert  Pain score: 5  Airway patency: patent  Nausea & Vomiting: no nausea and no vomiting  Cardiovascular status: blood pressure returned to baseline  Respiratory status: acceptable  Hydration status: euvolemic

## 2021-08-09 ENCOUNTER — OFFICE VISIT (OUTPATIENT)
Dept: RHEUMATOLOGY | Age: 31
End: 2021-08-09
Payer: MEDICARE

## 2021-08-09 VITALS
TEMPERATURE: 98.8 F | WEIGHT: 142.8 LBS | SYSTOLIC BLOOD PRESSURE: 114 MMHG | RESPIRATION RATE: 16 BRPM | HEART RATE: 72 BPM | DIASTOLIC BLOOD PRESSURE: 70 MMHG | BODY MASS INDEX: 23.05 KG/M2

## 2021-08-09 DIAGNOSIS — M32.9 SYSTEMIC LUPUS ERYTHEMATOSUS, UNSPECIFIED SLE TYPE, UNSPECIFIED ORGAN INVOLVEMENT STATUS (HCC): Primary | ICD-10-CM

## 2021-08-09 PROCEDURE — 96413 CHEMO IV INFUSION 1 HR: CPT | Performed by: INTERNAL MEDICINE

## 2021-08-09 RX ORDER — HEPARIN SODIUM (PORCINE) LOCK FLUSH IV SOLN 100 UNIT/ML 100 UNIT/ML
500 SOLUTION INTRAVENOUS PRN
Status: CANCELLED | OUTPATIENT
Start: 2021-08-30

## 2021-08-09 RX ORDER — METHYLPREDNISOLONE SODIUM SUCCINATE 40 MG/ML
40 INJECTION, POWDER, LYOPHILIZED, FOR SOLUTION INTRAMUSCULAR; INTRAVENOUS ONCE
Status: CANCELLED | OUTPATIENT
Start: 2021-08-30 | End: 2021-08-30

## 2021-08-09 RX ORDER — SODIUM CHLORIDE 9 MG/ML
INJECTION, SOLUTION INTRAVENOUS CONTINUOUS
Status: CANCELLED | OUTPATIENT
Start: 2021-08-30

## 2021-08-09 RX ORDER — EPINEPHRINE 1 MG/ML
0.3 INJECTION, SOLUTION, CONCENTRATE INTRAVENOUS PRN
Status: CANCELLED | OUTPATIENT
Start: 2021-08-30

## 2021-08-09 RX ORDER — SODIUM CHLORIDE 9 MG/ML
INJECTION, SOLUTION INTRAVENOUS CONTINUOUS
Status: DISCONTINUED | OUTPATIENT
Start: 2021-08-09 | End: 2021-08-09 | Stop reason: HOSPADM

## 2021-08-09 RX ORDER — SODIUM CHLORIDE 0.9 % (FLUSH) 0.9 %
5 SYRINGE (ML) INJECTION PRN
Status: CANCELLED | OUTPATIENT
Start: 2021-08-30

## 2021-08-09 RX ORDER — ACETAMINOPHEN 325 MG/1
650 TABLET ORAL ONCE
Status: CANCELLED | OUTPATIENT
Start: 2021-08-30

## 2021-08-09 RX ORDER — DIPHENHYDRAMINE HYDROCHLORIDE 50 MG/ML
50 INJECTION INTRAMUSCULAR; INTRAVENOUS ONCE
Status: CANCELLED | OUTPATIENT
Start: 2021-08-30 | End: 2021-08-30

## 2021-08-09 RX ORDER — SODIUM CHLORIDE 0.9 % (FLUSH) 0.9 %
10 SYRINGE (ML) INJECTION PRN
Status: CANCELLED | OUTPATIENT
Start: 2021-08-30

## 2021-08-09 NOTE — PROGRESS NOTES
Pt here for Benlysta infusion #7. No follow up visit or labs today. No  Adverse effects from previous infusion. Today 142 lbs =64.54 kg   X 10 mg/kg = 645 mg, Rounded to 720 mg per MD.  Infusion  tolerated well. Next visit scheduled  in 4 weeks. IV Gauge: #right antecubital  IV Site: 22  # of Attempts: 1  IV Start: 1010 am  IV Stop: 1110 am      Was booked as office visit instead of nurse/infusion visit. Only had infusion today not a MD visit.      See flowsheets and [facility]

## 2021-08-09 NOTE — PATIENT INSTRUCTIONS
To avoid any disruptions in your treatment, we ask that you please notify us immediately if you have any insurance changes such as a new plan or if you might be losing coverage. If the infusion nurse does not receive your new insurance information, your infusion may be temporarily held until we can check your benefits and get an authorization from your new insurance. Please notify us BEFORE your next infusion, as it may take several weeks to check your benefits and for your new insurance to approve your continuing treatment. Please know that we want to help you in anyway we can, so notify us early! If your insurance coverage changes in ANY way you must do the follow:   Contact our office and speak with the Infusion Nurse. All calls will be returned within 24 hours, except on weekends. We will need a copy of both the front and back of your new card.  Infusion appointments will be placed on HOLD until a new Verification of Benefits and/or Prior Authorization (if required) is acquired   a. A NEW VOB (verification of benefits) must be requested to verify coverage for the medication and infusion fees. b. If a Prior Authorization is needed, we will obtain this and provide information needed to your insurance. c. This process can take 2-3 weeks depending on insurance.  Once the new VOB has been obtained the Infusion RN or a member of the Rheumatology staff will contact you to discuss changes in coverage. A new infusion schedule can be made at this time.  Changes in insurance may disqualify you from receiving infusion coverage, however, we will do everything we can to get your treatment, as we know it is necessary for your disease and quality of living. *As a reminder, anyone on Patient Copay Assistance (Commercial insurance ONLY. This does NOT pertain to Medicare or Medicaid). You should have received information from the drug  regarding how the copay programs work.   If you have not received the information, please let us know. You are responsible for sending in the Explanation of Benefits (EOBs) and [employer] for dates of service to your individual Savings Program and then following thru to ensure it was approved. If you are having difficulty understanding the program, please reach out immediately for assistance. If these are not sent in a timely manner, you will be responsible for the large portion that your insurance doesn't cover, which can be in the thousands! These charges can add up quickly and we don't want you to be stuck with large bills or your treatment be stopped for non-payment of the medication. *Reminder Appointments are schedule one appointment ahead if you come every 6-8 weeks, and 2 ahead if you come every 4 weeks. Please know that we cannot infuse if the physician is not present, so if we cannot accommodate your needs, please feel free to ask what other options are available. The nurse will do the best to accommodate your needs , there may be times thatthis is not always possible. Please be patient as we try to accommodate patients in a safe, and timely manner. If the physician is out, be aware that you may need to go to our [facility] to hae your treatment in a timely manner. If you have questions, please speak with a member of the Rheumatology staff. We are here to help and guide you through this infusion process and ensure that you get the treatment that you need. Thank you for letting us take care of you! Thank you for your cooperation and help! We are grateful to serve you! Hello! Thank you for choosing [facility]) for your infusion. We look forward to serving you! The Day of Your Infusion   On the day of your appointment, please arrive 5-10 minutes early. You will go to the  and check in.       Preparing for Your Infusion   Please be sure to drink at least 16 ounces of water, bring a list of your current medications, insurance card, and copayment. Please wear comfortable clothes and dress in layers. You are welcome to bring a blanket and pillow, your laptop/tablet, and whatever you might need to be comfortable and pass the time during your infusion. When to Cancel   Please contact your physician if you have a fever, infection, are on antibiotics, have yellow/brown/green drainage, or plan to have surgery. If you have any questions about whether you should receive your infusion, please contact your physician. If you need to cancel your appointment, please call at least 48 hours in advance. If you are more than 15 minutes late, you may be asked to reschedule. In the event of an emergency or reaction, please call 911 or contact your prescriber. We look forward to caring for you. If you have any questions or concerns, please feel free to call or message us thru [address] [place] location: [phone]  [facility] location: [phone]      Your Responsibilities:   Provide accurate and complete information about your health, address, telephone number, date of birth, insurance carrier and employer.  Call if you cannot keep your appointment.  Be respectful of your [facility] team.    Please remember that the space is shared with other patients. [facility] asks that you be considerate of your language and conduct.  Give us a copy of your advance directive.  Ask questions if there is anything you do not understand.  Report unexpected changes in your health.  Take responsibility for the consequences of refusing care or not following instructions.  Pay your bills and take responsibility for your copay assistance. Is compliance to medication therapy important? Our nurses understand how crucial a patient's compliance to their prescribed therapy is to the outcomes of the therapy program for their specific disease state.  Noncompliance or poor compliance to the patient medication treatment plan significantly increases the risk of poor health outcomes while increasing the risk of an adverse event, overall cost of care, and/or negative impacts on quality of life. As a partner in your healthcare, we want you to understand that infusion therapy is typically a long term commitment. The treatment decided by you and your physician will require compliance on your part. Reminder Appointments are schedule one appointment ahead if you come every 6-8 weeks, and 2 ahead if you come every 4 weeks. Please know that we cannot infuse if the physician is not present, so if we cannot accommodate your needs, please feel free to ask what other options are available. The nurse will do the best to accommodate your needs , there may be times thatthis is not always possible. Please be patient as we try to accommodate patients in a safe, and timely manner. If the physician is out, be aware that you may need to go to our [facility] to hae your treatment in a timely manner. If you have questions, please speak with a member of the Rheumatology staff. We are here to help and guide you through this infusion process and ensure that you get the treatment that you need.

## 2021-08-10 ENCOUNTER — VIRTUAL VISIT (OUTPATIENT)
Dept: PULMONOLOGY | Age: 31
End: 2021-08-10
Payer: MEDICARE

## 2021-08-10 DIAGNOSIS — M79.7 FIBROMYALGIA: Chronic | ICD-10-CM

## 2021-08-10 DIAGNOSIS — G47.33 OSA (OBSTRUCTIVE SLEEP APNEA): Primary | ICD-10-CM

## 2021-08-10 PROCEDURE — 99214 OFFICE O/P EST MOD 30 MIN: CPT | Performed by: NURSE PRACTITIONER

## 2021-08-10 ASSESSMENT — SLEEP AND FATIGUE QUESTIONNAIRES
HOW LIKELY ARE YOU TO NOD OFF OR FALL ASLEEP WHILE SITTING INACTIVE IN A PUBLIC PLACE: 0
ESS TOTAL SCORE: 11
HOW LIKELY ARE YOU TO NOD OFF OR FALL ASLEEP WHILE SITTING AND READING: 3
HOW LIKELY ARE YOU TO NOD OFF OR FALL ASLEEP WHILE SITTING AND TALKING TO SOMEONE: 0
HOW LIKELY ARE YOU TO NOD OFF OR FALL ASLEEP WHILE SITTING QUIETLY AFTER LUNCH WITHOUT ALCOHOL: 0
HOW LIKELY ARE YOU TO NOD OFF OR FALL ASLEEP WHILE LYING DOWN TO REST IN THE AFTERNOON WHEN CIRCUMSTANCES PERMIT: 3
HOW LIKELY ARE YOU TO NOD OFF OR FALL ASLEEP IN A CAR, WHILE STOPPED FOR A FEW MINUTES IN TRAFFIC: 0
HOW LIKELY ARE YOU TO NOD OFF OR FALL ASLEEP WHILE WATCHING TV: 2
HOW LIKELY ARE YOU TO NOD OFF OR FALL ASLEEP WHEN YOU ARE A PASSENGER IN A CAR FOR AN HOUR WITHOUT A BREAK: 3

## 2021-08-10 NOTE — PROGRESS NOTES
Yes  [x] No   Nocturia   0  per night. Having  HA upon waking  [] Yes  [x] No   Dry mouth upon waking   Dry Nose  Dry Eyes  [x] Yes  [] No   Congestion upon waking   [] Yes  [x] No    Nose Bleeds  [] Yes  [x] No   Using Sleep Aides  Hydroxyzine , Trazodone and Lyrica  [] NA  [] OTC  [] Per our office   [x] Per another provider   Understands how to change humidification and/or tubing temperature for comfort while at home  [x] Yes  [] No     Difficulties falling asleep  [] Yes  [x] No   Difficulties staying asleep  [] Yes  [x] No   Approximate time to bed  10-11pm   Approximate wake time  7-7:30am   Taking Naps  no   If taking naps usual length  [x] NA   If taking naps using the machine [x] NA  [] Yes    [] No    [] With and With out    Drowsy when driving  [] Yes  [x] No     Does patient carry a DOT/CDL  [] Yes  [x] No     Does patient carry FAA/[employer] License   [] Yes  [x] No      Any concerns noted with the machine at this time  [] Yes  [x] No       Assessment/Plan:     1. LEOLA (obstructive sleep apnea)  Assessment & Plan:  After downloading data and reviewing  Reviewed compliance download with pt. Supplies and parts as needed for his machine. These are medically necessary. Continue medications per his PCP and other physicians. Limit caffeine use after 3pm.    The chronic medical conditions listed are directly related to the primary diagnosis listed above. The management of the primary diagnosis affects the secondary diagnosis and vice versa    Patient is NOT compliant at this time. Increasing the risk of heart attacks, strokes, car accidents, and/or death from uncontrolled Obstructive Sleep Apnea      2. Fibromyalgia  Assessment & Plan:  Chronic- stable. After speaking with patient:    Agree with current plan, and would agree to continue this plan per prescribing and managing physician.           The chronic medical conditions listed are directly related to the primary diagnosis listed above.  The management of the primary diagnosis affects the secondary diagnosis and vice versa. - After pulling data and reviewing it   - Continue medications per her PCP and other physicians.   - she instructed not to drive unless had 4 hrs of effective therapy for her LEOLA the night before. - Did review the risks of under or untreated LEOLA including, but not limited to, higher risks of motor vehicle accidents, stroke, heart attacks, and death. - she understands and accepts all these risks. - The patient is advised to use the machine every night.   - Patient using  [employer] for supplies  - Patient educated on   Napping with the machine  Therapeutic time versus run time   Titration studies (Patient has failed the current treatment)  Difficult to educate with frequent interuptions   Stimulants with other healthcare issues   Office locations  Compliance  Follow up  After speaking to the patient, patient is currently stable.  We will continue with the current machine settings  Cleaning    Refused testing   -Will follow up in office in 3 months      Electronically signed by YOVANNY Bennett CNP on 8/10/2021 at 2:49 PM

## 2021-08-12 ENCOUNTER — HOSPITAL ENCOUNTER (EMERGENCY)
Age: 31
Discharge: HOME | End: 2021-08-12
Attending: EMERGENCY MEDICINE
Payer: MEDICARE

## 2021-08-12 VITALS
SYSTOLIC BLOOD PRESSURE: 135 MMHG | DIASTOLIC BLOOD PRESSURE: 80 MMHG | OXYGEN SATURATION: 100 % | WEIGHT: 142.9 LBS | HEIGHT: 66 IN | HEART RATE: 88 BPM | RESPIRATION RATE: 15 BRPM | BODY MASS INDEX: 22.97 KG/M2 | TEMPERATURE: 98.6 F

## 2021-08-12 DIAGNOSIS — S91.114A LACERATION OF LESSER TOE OF RIGHT FOOT WITHOUT FOREIGN BODY PRESENT OR DAMAGE TO NAIL, INITIAL ENCOUNTER: Primary | ICD-10-CM

## 2021-08-12 PROCEDURE — 12001 RPR S/N/AX/GEN/TRNK 2.5CM/<: CPT

## 2021-08-12 PROCEDURE — 99284 EMERGENCY DEPT VISIT MOD MDM: CPT

## 2021-08-12 ASSESSMENT — PAIN DESCRIPTION - PAIN TYPE: TYPE: CHRONIC PAIN

## 2021-08-12 NOTE — ED PROVIDER NOTES
Emergency Department Provider Note  Location: [address] [place] [address]  8/12/2021     Patient Identification  George Chavez is a 28 y.o. female    Chief Complaint  Laceration (right pinky toe )      Mode of Arrival  private car    HPI  (History provided by patient)  This is a 28 y.o. female presented today for right 5th toe laceration. Patient said less than an hour ago, she was walking in her home and her right fifth toe struck the corner of a wall. She felt immediate sharp pain and noticed bleeding. She noticed a laceration at the very tip of the toe. Bleeding was controlled prior to arrival after she applied direct pressure. She states her last tetanus vaccination is within 2 to 3 years. She denies injury anywhere else. ROS  Review of Systems   Skin: Positive for wound. Neurological: Negative for syncope and numbness. Hematological: Does not bruise/bleed easily. I have reviewed the following nursing documentation:  Allergies: Allergies   Allergen Reactions    Latex Hives, Itching, Rash and Swelling    Adhesive Tape     Pcn [Penicillins] Nausea And Vomiting    Bee Pollen      Other reaction(s): Other (See Comments)  Sneezing, congestion    Glucosamine      Pt states she is not allergic to shellfish.  Nickel Other (See Comments)     rash    Other      Other reaction(s): Other (See Comments)  Sneezing, congestion, rash       Past medical history:  has a past medical history of Anxiety, Asthma, Bulimia, Depression, Environmental allergies, Fibromyalgia (3/17/2021), Fibromyalgia syndrome, LEONEL (generalized anxiety disorder) (3/17/2021), Gestational hypertension, antepartum, Lupus ([address] Other specified hypothyroidism (3/17/2021), and Seizures ([address] Past surgical history:  has a past surgical history that includes Tonsillectomy (2013); lymphadenectomy (2012); Carpal tunnel release (Bilateral, 2020); Bladder surgery (2017);  Wrist surgery (Right, 2/26/2021); and Arm Surgery (Right, 7/16/2021). Home medications:   Prior to Admission medications    Medication Sig Start Date End Date Taking? Authorizing Provider   DULoxetine (CYMBALTA) 60 MG extended release capsule TAKE 1 CAPSULE BY MOUTH EVERY DAY 8/12/21   Darlene Dias MD   levothyroxine (SYNTHROID) 50 MCG tablet TAKE 1 TABLET BY MOUTH EVERY DAY 7/12/21   Trent Trinh MD   Erenumab-aooe 70 MG/ML SOAJ Inject 1 cc (70 mg) subcutaneously every 30 days 6/30/21   Melida Stock MD   clotrimazole-betamethasone (LOTRISONE) 1-0.05 % cream Apply topically 2 times daily. Patient taking differently: as needed Apply topically 2 times daily. 5/21/21   Darlene Dias MD   pregabalin (LYRICA) 25 MG capsule Take 1 capsule by mouth 2 times daily for 92 days. 5/3/21 8/3/21  Fouzia Mathew MD   traZODone (DESYREL) 50 MG tablet Take 1 tablet by mouth nightly 3/4/21   Darlene Dias MD   Rimegepant Sulfate (NURTEC) 75 MG TBDP Take by mouth as needed     Historical Provider, MD   Albuterol Sulfate (PROAIR HFA IN) Inhale 1 puff into the lungs every 4 hours as needed    Historical Provider, MD   hydrOXYzine (ATARAX) 50 MG tablet Take 50 mg by mouth 3 times daily as needed for Anxiety     Historical Provider, MD   mometasone (ELOCON) 0.1 % cream Apply topically daily. 1/5/21   Darlene Dias MD       Social history:  reports that she quit smoking about 4 years ago. Her smoking use included cigarettes. She started smoking about 6 years ago. She smoked 0.20 packs per day. She has quit using smokeless tobacco. She reports previous drug use. She reports that she does not drink alcohol.     Family history:    Family History   Problem Relation Age of Onset   [employer] Hypertension Mother     Thyroid Disease Mother     Depression Father     Sleep Apnea Father     Depression Sister     Anxiety Disorder Sister     Migraines Sister     Glaucoma Maternal Grandmother     Heart Attack Maternal Grandfather     Heart Disease Maternal Grandfather     Cancer Paternal Grandmother     Cancer Paternal Grandfather        Exam  ED Triage Vitals [08/12/21 1330]   BP Temp Temp Source Pulse Resp SpO2 Height Weight   135/80 98.6 °F (37 °C) Oral 88 15 100 % 5' 6\" (1.676 m) 142 lb 14.4 oz (64.8 kg)   Physical Exam  Vitals and nursing note reviewed. Constitutional:       General: She is not in acute distress. Appearance: Normal appearance. She is well-developed. She is not diaphoretic. HENT:      Head: Normocephalic and atraumatic. Eyes:      General: No scleral icterus. Right eye: No discharge. Left eye: No discharge. Neck:      Trachea: No tracheal deviation. Cardiovascular:      Pulses:           Dorsalis pedis pulses are 2+ on the right side. Posterior tibial pulses are 2+ on the right side. Pulmonary:      Effort: Pulmonary effort is normal. No respiratory distress. Breath sounds: No stridor. Musculoskeletal:      Right lower leg: No edema. Left lower leg: No edema. Right foot: Normal capillary refill. Swelling (base of right 5th toe) and tenderness (tip of right 5th toe; not as tender at the base of toe) present. Normal pulse. Skin:     General: Skin is warm and dry. Coloration: Skin is not pale. Findings: Bruising (faint bruising noted at base of right 5th toe) and laceration (avulsion of skin at the tip of the right 5th toe, total length of wound about 1cm) present. Neurological:      General: No focal deficit present. Mental Status: She is alert and oriented to person, place, and time. Cranial Nerves: No dysarthria or facial asymmetry. Sensory: No sensory deficit. Motor: No weakness. Psychiatric:         Mood and Affect: Mood and affect normal.         Speech: Speech normal.         Behavior: Behavior normal.         MDM/ED Course  - Patient seen and evaluated in room 6.  28 y.o. female presented for right 5th toe laceration.   Exam also showed some bruising and swelling at the base of the right fifth toe. I discussed with the patient the possibility of a toe fracture. Patient is not as concerned about it and declined an x-ray. With regard to the laceration itself, it is at the tip of the toe and the wound is superficial enough that it is amendable to Dermabond repair. Discussed this with the patient and she would also prefer Dermabond over stitches. Procedure Note:  Wound Repair  Emilia Painting or their surrogate had an opportunity to ask questions, and the risks, benefits,   and alternatives were discussed. The patients 1 cm laceration was irrigated with copious amount of normal saline under pressure (done by sonia Hess; I was present and observed). Hemostasis was obtained and wound was explored to base under bloodless field. No foreign bodies were noted. Sterile field was obtained and wound was closed with dermabond. - Wound care instructions given to the patient. Return precautions also discussed. patient verbalized understanding of care plan and agreed to follow-up with PCP as advised. I estimate there is LOW risk for CELLULITIS, COMPARTMENT SYNDROME, NECROTIZING FASCIITIS, TENDON OR NEUROVASCULAR INJURY, or FOREIGN BODY, thus I consider the discharge disposition reasonable. Also, there is no evidence or peritonitis, sepsis, or toxicity. Emilia Painting and I have discussed the diagnosis and risks, and we agree with discharging home to follow-up with PCP. We also discussed returning to the Emergency Department immediately if new or worsening symptoms occur. We have discussed the symptoms which are most concerning (e.g., changing or worsening pain, fever, numbness, weakness, cool or painful digits) that necessitate immediate return. Clinical Impression:  1. Laceration of lesser toe of right foot without foreign body present or damage to nail, initial encounter          Disposition:  Discharge to home in good condition.     Blood pressure 135/80, pulse 88, temperature 98.6 °F (37 °C), temperature source Oral, resp. rate 15, height 5' 6\" (1.676 m), weight 142 lb 14.4 oz (64.8 kg), last menstrual period 07/11/2021, SpO2 100 %, not currently breastfeeding. Disposition referral (if applicable):  Darlene Dias MD  [address]  [address] [place] [address]    Schedule an appointment as soon as possible for a visit in 1 week  For wound re-check       This chart was generated in part by using Dragon Dictation system and may contain errors related to that system including errors in grammar, punctuation, and spelling, as well as words and phrases that may be inappropriate. If there are any questions or concerns please feel free to contact the dictating provider for clarification.      Jarrett Chappell MD  [facility] Elie Hernandez MD  08/12/21 4441

## 2021-09-08 ENCOUNTER — NURSE ONLY (OUTPATIENT)
Dept: RHEUMATOLOGY | Age: 31
End: 2021-09-08
Payer: MEDICARE

## 2021-09-08 VITALS
TEMPERATURE: 98 F | DIASTOLIC BLOOD PRESSURE: 68 MMHG | RESPIRATION RATE: 16 BRPM | BODY MASS INDEX: 23.02 KG/M2 | WEIGHT: 142.6 LBS | HEART RATE: 68 BPM | SYSTOLIC BLOOD PRESSURE: 114 MMHG

## 2021-09-08 DIAGNOSIS — M32.9 SYSTEMIC LUPUS ERYTHEMATOSUS, UNSPECIFIED SLE TYPE, UNSPECIFIED ORGAN INVOLVEMENT STATUS (HCC): Primary | ICD-10-CM

## 2021-09-08 PROCEDURE — 96413 CHEMO IV INFUSION 1 HR: CPT | Performed by: INTERNAL MEDICINE

## 2021-09-08 RX ORDER — METHYLPREDNISOLONE SODIUM SUCCINATE 40 MG/ML
40 INJECTION, POWDER, LYOPHILIZED, FOR SOLUTION INTRAMUSCULAR; INTRAVENOUS ONCE
Status: CANCELLED | OUTPATIENT
Start: 2021-09-29 | End: 2021-09-29

## 2021-09-08 RX ORDER — SODIUM CHLORIDE 0.9 % (FLUSH) 0.9 %
10 SYRINGE (ML) INJECTION PRN
Status: CANCELLED | OUTPATIENT
Start: 2021-09-29

## 2021-09-08 RX ORDER — SODIUM CHLORIDE 0.9 % (FLUSH) 0.9 %
5 SYRINGE (ML) INJECTION PRN
Status: CANCELLED | OUTPATIENT
Start: 2021-09-29

## 2021-09-08 RX ORDER — ACETAMINOPHEN 325 MG/1
650 TABLET ORAL ONCE
Status: CANCELLED | OUTPATIENT
Start: 2021-09-29

## 2021-09-08 RX ORDER — DIPHENHYDRAMINE HYDROCHLORIDE 50 MG/ML
50 INJECTION INTRAMUSCULAR; INTRAVENOUS ONCE
Status: CANCELLED | OUTPATIENT
Start: 2021-09-29 | End: 2021-09-29

## 2021-09-08 RX ORDER — SODIUM CHLORIDE 9 MG/ML
INJECTION, SOLUTION INTRAVENOUS CONTINUOUS
Status: CANCELLED | OUTPATIENT
Start: 2021-09-29

## 2021-09-08 RX ORDER — EPINEPHRINE 1 MG/ML
0.3 INJECTION, SOLUTION, CONCENTRATE INTRAVENOUS PRN
Status: CANCELLED | OUTPATIENT
Start: 2021-09-29

## 2021-09-08 RX ORDER — HEPARIN SODIUM (PORCINE) LOCK FLUSH IV SOLN 100 UNIT/ML 100 UNIT/ML
500 SOLUTION INTRAVENOUS PRN
Status: CANCELLED | OUTPATIENT
Start: 2021-09-29

## 2021-09-08 RX ORDER — SODIUM CHLORIDE 9 MG/ML
INJECTION, SOLUTION INTRAVENOUS CONTINUOUS
Status: DISCONTINUED | OUTPATIENT
Start: 2021-09-08 | End: 2021-09-08 | Stop reason: HOSPADM

## 2021-09-08 NOTE — PROGRESS NOTES
Patient called to say she was runinng late for appointment. Arrived at 0911 am for 830 am appointment. Pt here for Benlysta infusion #8. Declined to wait to see Dr. Jace Richardson today. No  Adverse effects from previous infusion. Today 142 lbs =64.54 kg   X 10 mg/kg = 645 mg, Rounded to 720 mg mg per MD.  Infusion  tolerated well. Next visit scheduled  in 4 weeks.     IV Gauge: #22  IV Site: left antecubital  # of Attempts: 1  IV Start: 0920 am  IV Stop: 1020 am      See flowsheets and MAR

## 2021-09-08 NOTE — PATIENT INSTRUCTIONS
To avoid any disruptions in your treatment, we ask that you please notify us immediately if you have any insurance changes such as a new plan or if you might be losing coverage. If the infusion nurse does not receive your new insurance information, your infusion may be temporarily held until we can check your benefits and get an authorization from your new insurance. Please notify us BEFORE your next infusion, as it may take several weeks to check your benefits and for your new insurance to approve your continuing treatment. Please know that we want to help you in anyway we can, so notify us early! If your insurance coverage changes in ANY way you must do the follow:   Contact our office and speak with the Infusion Nurse. All calls will be returned within 24 hours, except on weekends. We will need a copy of both the front and back of your new card.  Infusion appointments will be placed on HOLD until a new Verification of Benefits and/or Prior Authorization (if required) is acquired   a. A NEW VOB (verification of benefits) must be requested to verify coverage for the medication and infusion fees. b. If a Prior Authorization is needed, we will obtain this and provide information needed to your insurance. c. This process can take 2-3 weeks depending on insurance.  Once the new VOB has been obtained the Infusion RN or a member of the Rheumatology staff will contact you to discuss changes in coverage. A new infusion schedule can be made at this time.  Changes in insurance may disqualify you from receiving infusion coverage, however, we will do everything we can to get your treatment, as we know it is necessary for your disease and quality of living. *As a reminder, anyone on Patient Copay Assistance (Commercial insurance ONLY. This does NOT pertain to Medicare or Medicaid). You should have received information from the drug  regarding how the copay programs work.   If you have not received the information, please let us know. You are responsible for sending in the Explanation of Benefits (EOBs) and [employer] for dates of service to your individual Savings Program and then following thru to ensure it was approved. If you are having difficulty understanding the program, please reach out immediately for assistance. If these are not sent in a timely manner, you will be responsible for the large portion that your insurance doesn't cover, which can be in the thousands! These charges can add up quickly and we don't want you to be stuck with large bills or your treatment be stopped for non-payment of the medication. *Reminder Appointments are schedule one appointment ahead if you come every 6-8 weeks, and 2 ahead if you come every 4 weeks. Please know that we cannot infuse if the physician is not present, so if we cannot accommodate your needs, please feel free to ask what other options are available. The nurse will do the best to accommodate your needs , there may be times thatthis is not always possible. Please be patient as we try to accommodate patients in a safe, and timely manner. If the physician is out, be aware that you may need to go to our [facility] to hae your treatment in a timely manner. If you have questions, please speak with a member of the Rheumatology staff. We are here to help and guide you through this infusion process and ensure that you get the treatment that you need. Thank you for letting us take care of you! Thank you for your cooperation and help! We are grateful to serve you!

## 2021-09-30 ENCOUNTER — OFFICE VISIT (OUTPATIENT)
Dept: NEUROLOGY | Age: 31
End: 2021-09-30
Payer: MEDICARE

## 2021-09-30 VITALS
BODY MASS INDEX: 22.82 KG/M2 | WEIGHT: 142 LBS | DIASTOLIC BLOOD PRESSURE: 89 MMHG | HEIGHT: 66 IN | HEART RATE: 93 BPM | SYSTOLIC BLOOD PRESSURE: 140 MMHG

## 2021-09-30 DIAGNOSIS — G43.019 INTRACTABLE MIGRAINE WITHOUT AURA AND WITHOUT STATUS MIGRAINOSUS: ICD-10-CM

## 2021-09-30 DIAGNOSIS — G47.33 OSA (OBSTRUCTIVE SLEEP APNEA): Primary | ICD-10-CM

## 2021-09-30 DIAGNOSIS — M79.7 FIBROMYALGIA: Chronic | ICD-10-CM

## 2021-09-30 DIAGNOSIS — E03.9 ACQUIRED HYPOTHYROIDISM: Primary | ICD-10-CM

## 2021-09-30 PROCEDURE — 99213 OFFICE O/P EST LOW 20 MIN: CPT | Performed by: PSYCHIATRY & NEUROLOGY

## 2021-09-30 NOTE — PROGRESS NOTES
Colonel Isabel   Neurology followup    Subjective:   CC/HP  History was obtained from the patient. Patient states that she is doing better. Patient stated that the Macrina Hives seems to help. No major side effects to medication  She now gets about 4 headaches in a month and feels that most of them are coming from her neck from her fibromyalgia. Detailed initial history:  Patient states that she has had migraine headaches for a number of years. She used to live in [place] and was seen by neurology there. They have tried her on several medications including topiramate amitriptyline and Botox injections. These were not very helpful. She was then put on Emgality. This seemed to help the most.  She also got trigger point injections at times. Patient then moved to [place]. She has been off the Emgality injections. She continues to have severe headaches. The headaches are approximately 1-3 headaches a week. They can last for several hours. Bright light and loud noise may make the headaches worse and sleeping in a quiet dark room would make it better. Patient also has scintillating scotoma. Patient also has nausea with severe headaches. No other focal neurological symptoms. Patient sister also has migraines. Patient has no systemic lupus erythematosus.   Patient states that she had seizure-like episodes in [place] but was never treated as such for epilepsy    REVIEW OF SYSTEMS    Constitutional:  []   Chills   []  Fatigue   []  Fevers   []  Malaise   []  Weight loss     [] Denies all of the above    Respiratory:   []  Cough    []  Shortness of breath         [] Denies all of the above     Cardiovascular:   []  Chest pain    []  Exertional chest pressure/discomfort           [] Palpitations    []  Syncope     [] Denies all of the above        Past Medical History:   Diagnosis Date    Anxiety     Asthma     Bulimia     Depression     Environmental allergies     Fibromyalgia 3/17/2021    Fibromyalgia syndrome     LEONEL (generalized anxiety disorder) 3/17/2021    Gestational hypertension, antepartum     Lupus ([facility] [address]     just diagnosised with lupus last week    Other specified hypothyroidism 3/17/2021    [facility]      Family History   Problem Relation Age of Onset    Hypertension Mother     Thyroid Disease Mother     Depression Father     Sleep Apnea Father     Depression Sister     Anxiety Disorder Sister     Migraines Sister     Glaucoma Maternal Grandmother     Heart Attack Maternal Grandfather     Heart Disease Maternal Grandfather     Cancer Paternal Grandmother     Cancer Paternal Grandfather      Social History     Socioeconomic History    Marital status:      Spouse name: None    Number of children: None    Years of education: None    Highest education level: None   Occupational History    None   Tobacco Use    Smoking status: Former Smoker     Packs/day: 0.20     Types: Cigarettes     Start date:      Quit date:      Years since quittin.7    Smokeless tobacco: Former User   Substance and Sexual Activity    Alcohol use: No    Drug use: Not Currently    Sexual activity: None   Other Topics Concern    None   Social History Narrative    None     Social Determinants of Health     Financial Resource Strain: Low Risk     Difficulty of Paying Living Expenses: Not hard at all   Food Insecurity: No Food Insecurity    Worried About Running Out of Food in the Last Year: Never true    Danny of Food in the Last Year: Never true   Transportation Needs: No Transportation Needs    Lack of Transportation (Medical): No    Lack of Transportation (Non-Medical):  No   Physical Activity:     Days of Exercise per Week:     Minutes of Exercise per Session:    Stress:     Feeling of Stress :    Social Connections:     Frequency of Communication with Friends and Family:     Frequency of Social Gatherings with Friends and Family:     Attends [employer] generated using dragon dictation software. Although every effort was made to ensure the accuracy of this automated transcription, some errors in transcription may have occurred.

## 2021-10-01 RX ORDER — TRAZODONE HYDROCHLORIDE 50 MG/1
50 TABLET ORAL NIGHTLY
Qty: 90 TABLET | Refills: 1 | Status: SHIPPED | OUTPATIENT
Start: 2021-10-01 | End: 2022-04-04

## 2021-10-05 PROBLEM — Z79.899 HIGH RISK MEDICATION USE: Status: ACTIVE | Noted: 2021-10-05

## 2021-10-06 ENCOUNTER — NURSE ONLY (OUTPATIENT)
Dept: RHEUMATOLOGY | Age: 31
End: 2021-10-06
Payer: MEDICARE

## 2021-10-06 ENCOUNTER — OFFICE VISIT (OUTPATIENT)
Dept: RHEUMATOLOGY | Age: 31
End: 2021-10-06
Payer: MEDICARE

## 2021-10-06 VITALS
BODY MASS INDEX: 23.4 KG/M2 | TEMPERATURE: 98.1 F | HEART RATE: 72 BPM | DIASTOLIC BLOOD PRESSURE: 68 MMHG | WEIGHT: 145 LBS | RESPIRATION RATE: 16 BRPM | SYSTOLIC BLOOD PRESSURE: 116 MMHG

## 2021-10-06 DIAGNOSIS — Z79.899 HIGH RISK MEDICATION USE: ICD-10-CM

## 2021-10-06 DIAGNOSIS — E03.9 ACQUIRED HYPOTHYROIDISM: ICD-10-CM

## 2021-10-06 DIAGNOSIS — M32.9 SYSTEMIC LUPUS ERYTHEMATOSUS, UNSPECIFIED SLE TYPE, UNSPECIFIED ORGAN INVOLVEMENT STATUS (HCC): Primary | ICD-10-CM

## 2021-10-06 LAB
ALT SERPL-CCNC: 17 U/L (ref 10–40)
AST SERPL-CCNC: 18 U/L (ref 15–37)
BACTERIA: ABNORMAL /HPF
BASOPHILS ABSOLUTE: 0 K/UL (ref 0–0.2)
BASOPHILS RELATIVE PERCENT: 0.8 %
BILIRUBIN URINE: NEGATIVE
BLOOD, URINE: ABNORMAL
C-REACTIVE PROTEIN: <3 MG/L (ref 0–5.1)
C3 COMPLEMENT: 99.6 MG/DL (ref 90–180)
C4 COMPLEMENT: 11.5 MG/DL (ref 10–40)
CLARITY: ABNORMAL
COLOR: YELLOW
CREAT SERPL-MCNC: 0.8 MG/DL (ref 0.6–1.1)
CREATININE URINE: 106.8 MG/DL (ref 28–259)
EOSINOPHILS ABSOLUTE: 0.1 K/UL (ref 0–0.6)
EOSINOPHILS RELATIVE PERCENT: 1.7 %
EPITHELIAL CELLS, UA: 11 /HPF (ref 0–5)
GFR AFRICAN AMERICAN: >60
GFR NON-AFRICAN AMERICAN: >60
GLUCOSE URINE: NEGATIVE MG/DL
HCT VFR BLD CALC: 38.9 % (ref 36–48)
HEMOGLOBIN: 12.4 G/DL (ref 12–16)
HYALINE CASTS: 1 /LPF (ref 0–8)
KETONES, URINE: NEGATIVE MG/DL
LEUKOCYTE ESTERASE, URINE: ABNORMAL
LYMPHOCYTES ABSOLUTE: 1.3 K/UL (ref 1–5.1)
LYMPHOCYTES RELATIVE PERCENT: 26.8 %
MCH RBC QN AUTO: 26.8 PG (ref 26–34)
MCHC RBC AUTO-ENTMCNC: 31.7 G/DL (ref 31–36)
MCV RBC AUTO: 84.5 FL (ref 80–100)
MICROSCOPIC EXAMINATION: YES
MONOCYTES ABSOLUTE: 0.3 K/UL (ref 0–1.3)
MONOCYTES RELATIVE PERCENT: 7.2 %
NEUTROPHILS ABSOLUTE: 3 K/UL (ref 1.7–7.7)
NEUTROPHILS RELATIVE PERCENT: 63.5 %
NITRITE, URINE: NEGATIVE
PDW BLD-RTO: 16.4 % (ref 12.4–15.4)
PH UA: 7 (ref 5–8)
PLATELET # BLD: 214 K/UL (ref 135–450)
PMV BLD AUTO: 8.4 FL (ref 5–10.5)
PROTEIN PROTEIN: 11 MG/DL
PROTEIN UA: NEGATIVE MG/DL
PROTEIN/CREAT RATIO: 0.1 MG/DL
RBC # BLD: 4.61 M/UL (ref 4–5.2)
RBC UA: 1 /HPF (ref 0–4)
SEDIMENTATION RATE, ERYTHROCYTE: 5 MM/HR (ref 0–20)
SPECIFIC GRAVITY UA: 1.02 (ref 1–1.03)
URINE TYPE: ABNORMAL
UROBILINOGEN, URINE: 0.2 E.U./DL
WBC # BLD: 4.7 K/UL (ref 4–11)
WBC UA: 8 /HPF (ref 0–5)

## 2021-10-06 PROCEDURE — 96413 CHEMO IV INFUSION 1 HR: CPT | Performed by: INTERNAL MEDICINE

## 2021-10-06 PROCEDURE — 36415 COLL VENOUS BLD VENIPUNCTURE: CPT | Performed by: INTERNAL MEDICINE

## 2021-10-06 PROCEDURE — 99214 OFFICE O/P EST MOD 30 MIN: CPT | Performed by: INTERNAL MEDICINE

## 2021-10-06 PROCEDURE — 81003 URINALYSIS AUTO W/O SCOPE: CPT | Performed by: INTERNAL MEDICINE

## 2021-10-06 RX ORDER — SODIUM CHLORIDE 0.9 % (FLUSH) 0.9 %
5 SYRINGE (ML) INJECTION PRN
Status: CANCELLED | OUTPATIENT
Start: 2021-10-27

## 2021-10-06 RX ORDER — DIPHENHYDRAMINE HYDROCHLORIDE 50 MG/ML
50 INJECTION INTRAMUSCULAR; INTRAVENOUS ONCE
Status: CANCELLED | OUTPATIENT
Start: 2021-10-27 | End: 2021-10-27

## 2021-10-06 RX ORDER — ACETAMINOPHEN 325 MG/1
650 TABLET ORAL ONCE
Status: CANCELLED | OUTPATIENT
Start: 2021-10-27

## 2021-10-06 RX ORDER — HEPARIN SODIUM (PORCINE) LOCK FLUSH IV SOLN 100 UNIT/ML 100 UNIT/ML
500 SOLUTION INTRAVENOUS PRN
Status: CANCELLED | OUTPATIENT
Start: 2021-10-27

## 2021-10-06 RX ORDER — SODIUM CHLORIDE 0.9 % (FLUSH) 0.9 %
10 SYRINGE (ML) INJECTION PRN
Status: CANCELLED | OUTPATIENT
Start: 2021-10-27

## 2021-10-06 RX ORDER — EPINEPHRINE 1 MG/ML
0.3 INJECTION, SOLUTION, CONCENTRATE INTRAVENOUS PRN
Status: CANCELLED | OUTPATIENT
Start: 2021-10-27

## 2021-10-06 RX ORDER — METHYLPREDNISOLONE SODIUM SUCCINATE 40 MG/ML
40 INJECTION, POWDER, LYOPHILIZED, FOR SOLUTION INTRAMUSCULAR; INTRAVENOUS ONCE
Status: CANCELLED | OUTPATIENT
Start: 2021-10-27 | End: 2021-10-27

## 2021-10-06 RX ORDER — SODIUM CHLORIDE 9 MG/ML
INJECTION, SOLUTION INTRAVENOUS CONTINUOUS
Status: CANCELLED | OUTPATIENT
Start: 2021-10-27

## 2021-10-06 NOTE — PROGRESS NOTES
years, last infusion was in October 2020. She does not notice any change in her symptoms. She is on Cymbalta for fibromyalgia. Is on disability because of PTSD and lupus. A/PToday-  Continues to have chronic musculoskeletal discomfort in her joints as well as muscles. Combination of lupus and fibromyalgia. 1.  Fibromyalgia-we will increase Lyrica at the time of refill to 50 mg twice daily. 2.  Lupus-no overt flares, has ongoing musculoskeletal discomfort-check lupus activity markers. Continue Benlysta IV. She may be a good candidate for Saphnelo IV therapy. We will discuss more in upcoming office visit. She does not feel comfortable going back on hydroxychloroquine as she has used it for more than 10 years, concerned about ocular toxicity. 3.  Raynaud phenomena-persistent, no tissue loss. Is on amlodipine. Call with any flares, follow-up in 3 months. Patient indicates understanding and agrees with the management plan. I reviewed patient's history, referral documents and electronic medical records. Outside rheumatology records are scanned and reviewed as well as information from care everywhere is reviewed including rheumatology note, labs. #######################################################################      Subjective-  States that she continues to have generalized pain in her finger joints, wrists, elbows, shoulders and overall muscle pain from fibromyalgia. States that symptoms are manageable on current regimen, no overt flares. She did take 4 courses of prednisone over the summer because of poison ivy. Fatigue is getting worse. Wonders if Lyrica can be increased. No overt rashes, photosensitivity, mucositis, pleurisy, fever, chills, weight loss. Raynaud's is persistent without tissue loss. Tolerating medications well. No sedation. Denies any infections. All other ROS are negative. PMH, PSH,Social history , Meds reviewed. PHYSICAL EXAM:    Vitals:     There were no vitals taken for this visit. AA)x3 well nourished, and well groomed, normal judgement. MKS: Mild tenderness across her PIPs, MCPs, wrists, elbows as well as ankles. No appreciably swollen, inflamed joints or synovitis in upper or lower joints, full range of motion. Myofascial tender points are positive upper, lower extremities as well as trunk. Skin: No rashes, no induration or skin thickening or nodules. HEENT: Normal lids, lacrimal glands and pupils. No oral or nasal ulcers. Salivary glands reveal no evidence of abnormality. External inspection of the ears and nose within normal limits. Neck: No adenopathy or thyroid enlargement. Chest: Normal effort, CTA bilaterally, S1-S2 regular.       DATA:   Lab Results   Component Value Date    WBC 3.5 (L) 07/08/2021    HGB 11.9 (L) 07/08/2021    HCT 35.9 (L) 07/08/2021    MCV 83.1 07/08/2021     07/08/2021         Chemistry        Component Value Date/Time     01/22/2021 0854    K 3.7 01/22/2021 0854     01/22/2021 0854    CO2 28 01/22/2021 0854    BUN 15 01/22/2021 0854    CREATININE 0.7 07/08/2021 1055        Component Value Date/Time    CALCIUM 9.7 01/22/2021 0854    ALKPHOS 79 01/22/2021 0854    AST 21 07/08/2021 1055    ALT 15 07/08/2021 1055    BILITOT 0.4 01/22/2021 0854          No results found for: [place] [place]  Lab Results   Component Value Date    CRP <3.0 07/08/2021     Lab Results   Component Value Date    IRIS POSITIVE (A) 01/22/2021    SEDRATE 58 (H) 07/08/2021     No results found for: CKTOTAL  No results found for: TSH    C4 9      Total time 33 minutes that includes the following-  Preparing to see the patient such as reviewing patients records, pre-charting, preparing the visit on the same day, performing a medically appropriate history and physical examination, counseling and educating patient about diagnosis, management plan, ordering appropriate testings, prescriptions, communicating findings to other care providers, and documenting clinical information in electronic medical record. A/P- See above.

## 2021-10-06 NOTE — PATIENT INSTRUCTIONS
To avoid any disruptions in your treatment, we ask that you please notify us immediately if you have any insurance changes such as a new plan or if you might be losing coverage. If the infusion nurse does not receive your new insurance information, your infusion may be temporarily held until we can check your benefits and get an authorization from your new insurance. Please notify us BEFORE your next infusion, as it may take several weeks to check your benefits and for your new insurance to approve your continuing treatment. Please know that we want to help you in anyway we can, so notify us early! If your insurance coverage changes in ANY way you must do the follow:   Contact our office and speak with the Infusion Nurse. All calls will be returned within 24 hours, except on weekends. We will need a copy of both the front and back of your new card.  Infusion appointments will be placed on HOLD until a new Verification of Benefits and/or Prior Authorization (if required) is acquired   a. A NEW VOB (verification of benefits) must be requested to verify coverage for the medication and infusion fees. b. If a Prior Authorization is needed, we will obtain this and provide information needed to your insurance. c. This process can take 2-3 weeks depending on insurance.  Once the new VOB has been obtained the Infusion RN or a member of the Rheumatology staff will contact you to discuss changes in coverage. A new infusion schedule can be made at this time.  Changes in insurance may disqualify you from receiving infusion coverage, however, we will do everything we can to get your treatment, as we know it is necessary for your disease and quality of living. *As a reminder, anyone on Patient Copay Assistance (Commercial insurance ONLY. This does NOT pertain to Medicare or Medicaid). You should have received information from the drug  regarding how the copay programs work.   If you have not received the information, please let us know. You are responsible for sending in the Explanation of Benefits (EOBs) and [employer] for dates of service to your individual Savings Program and then following thru to ensure it was approved. If you are having difficulty understanding the program, please reach out immediately for assistance. If these are not sent in a timely manner, you will be responsible for the large portion that your insurance doesn't cover, which can be in the thousands! These charges can add up quickly and we don't want you to be stuck with large bills or your treatment be stopped for non-payment of the medication. *Reminder Appointments are schedule one appointment ahead if you come every 6-8 weeks, and 2 ahead if you come every 4 weeks. Please know that we cannot infuse if the physician is not present, so if we cannot accommodate your needs, please feel free to ask what other options are available. The nurse will do the best to accommodate your needs , there may be times thatthis is not always possible. Please be patient as we try to accommodate patients in a safe, and timely manner. If the physician is out, be aware that you may need to go to our [facility] to hae your treatment in a timely manner. If you have questions, please speak with a member of the Rheumatology staff. We are here to help and guide you through this infusion process and ensure that you get the treatment that you need. Thank you for letting us take care of you! Thank you for your cooperation and help! We are grateful to serve you!

## 2021-10-06 NOTE — PROGRESS NOTES
Pt here for Benlysta infusion #9, follow up visit with Dr. Indira Jurado and labs today, (per pt request, additional labs drawn for Dr Skylar Johnson). No  Adverse effects from previous infusion. Today 145 lbs =65 kg   X 10 mg/kg = 650 mg, Rounded to 720 mg per MD.  Infusion  tolerated well. Next visit scheduled  in 4 weeks.      IV Gauge: #22  IV Site: left antecubital  # of Attempts: 1  IV Start: 0830 am  IV Stop: 0930 am      See flowsheets and MAR

## 2021-10-07 LAB
ANTI-DSDNA IGG: 15 IU/ML (ref 0–9)
T3 FREE: 2.1 PG/ML (ref 2.3–4.2)
T4 FREE: 1 NG/DL (ref 0.9–1.8)
TSH REFLEX: 2.01 UIU/ML (ref 0.27–4.2)

## 2021-10-07 RX ORDER — LEVOTHYROXINE SODIUM 0.05 MG/1
TABLET ORAL
Qty: 90 TABLET | Refills: 0 | OUTPATIENT
Start: 2021-10-07

## 2021-10-07 NOTE — TELEPHONE ENCOUNTER
Medication:   Requested Prescriptions     Pending Prescriptions Disp Refills    levothyroxine (SYNTHROID) 50 MCG tablet [Pharmacy Med Name: LEVOTHYROXINE 50 MCG TABLET] 90 tablet 0     Sig: TAKE 1 TABLET BY MOUTH EVERY DAY       Last Filled:      Patient Phone Number: [phone] (home)     Last appt: 3/9/2021   Next appt: Visit date not found    Last Thyroid:   Lab Results   Component Value Date    FT3 2.1 10/06/2021    T4FREE 1.0 10/06/2021

## 2021-10-10 LAB — DSDNA ANTIBODY TITER: ABNORMAL

## 2021-11-03 ENCOUNTER — NURSE ONLY (OUTPATIENT)
Dept: RHEUMATOLOGY | Age: 31
End: 2021-11-03
Payer: MEDICARE

## 2021-11-03 VITALS
BODY MASS INDEX: 23.73 KG/M2 | RESPIRATION RATE: 16 BRPM | WEIGHT: 147 LBS | TEMPERATURE: 97.8 F | DIASTOLIC BLOOD PRESSURE: 68 MMHG | SYSTOLIC BLOOD PRESSURE: 116 MMHG | HEART RATE: 68 BPM

## 2021-11-03 DIAGNOSIS — M32.9 SYSTEMIC LUPUS ERYTHEMATOSUS, UNSPECIFIED SLE TYPE, UNSPECIFIED ORGAN INVOLVEMENT STATUS (HCC): Primary | ICD-10-CM

## 2021-11-03 DIAGNOSIS — Z79.899 HIGH RISK MEDICATION USE: ICD-10-CM

## 2021-11-03 DIAGNOSIS — M79.7 FIBROMYALGIA: ICD-10-CM

## 2021-11-03 PROCEDURE — 96413 CHEMO IV INFUSION 1 HR: CPT | Performed by: INTERNAL MEDICINE

## 2021-11-03 RX ORDER — SODIUM CHLORIDE 9 MG/ML
INJECTION, SOLUTION INTRAVENOUS CONTINUOUS
Status: CANCELLED | OUTPATIENT
Start: 2021-11-24

## 2021-11-03 RX ORDER — HEPARIN SODIUM (PORCINE) LOCK FLUSH IV SOLN 100 UNIT/ML 100 UNIT/ML
500 SOLUTION INTRAVENOUS PRN
Status: CANCELLED | OUTPATIENT
Start: 2021-11-24

## 2021-11-03 RX ORDER — EPINEPHRINE 1 MG/ML
0.3 INJECTION, SOLUTION, CONCENTRATE INTRAVENOUS PRN
Status: CANCELLED | OUTPATIENT
Start: 2021-11-24

## 2021-11-03 RX ORDER — ACETAMINOPHEN 325 MG/1
650 TABLET ORAL ONCE
Status: CANCELLED | OUTPATIENT
Start: 2021-11-24

## 2021-11-03 RX ORDER — SODIUM CHLORIDE 0.9 % (FLUSH) 0.9 %
5 SYRINGE (ML) INJECTION PRN
Status: CANCELLED | OUTPATIENT
Start: 2021-11-24

## 2021-11-03 RX ORDER — PREGABALIN 50 MG/1
CAPSULE ORAL
Qty: 180 CAPSULE | Refills: 1 | Status: SHIPPED | OUTPATIENT
Start: 2021-11-03 | End: 2022-05-09

## 2021-11-03 RX ORDER — METHYLPREDNISOLONE SODIUM SUCCINATE 40 MG/ML
40 INJECTION, POWDER, LYOPHILIZED, FOR SOLUTION INTRAMUSCULAR; INTRAVENOUS ONCE
Status: CANCELLED | OUTPATIENT
Start: 2021-11-24 | End: 2021-11-24

## 2021-11-03 RX ORDER — SODIUM CHLORIDE 0.9 % (FLUSH) 0.9 %
10 SYRINGE (ML) INJECTION PRN
Status: CANCELLED | OUTPATIENT
Start: 2021-11-24

## 2021-11-03 RX ORDER — DIPHENHYDRAMINE HYDROCHLORIDE 50 MG/ML
50 INJECTION INTRAMUSCULAR; INTRAVENOUS ONCE
Status: CANCELLED | OUTPATIENT
Start: 2021-11-24 | End: 2021-11-24

## 2021-11-03 NOTE — PROGRESS NOTES
Pt here for Benlysta infusion #10 no follow up visit or labs today. No  Adverse effects from previous infusion. Today 147 lbs =66 kg   X 10 mg/kg = 660 mg, Rounded to 720 mg per MD.  Infusion  tolerated well. Next visit scheduled  in 4 weeks.  Refill request for Lyrica sent to MD.     IV Gauge: #22  IV Site: left antecubital  # of Attempts: 1  IV Start: 0945 am  IV Stop: 1000 am      See flowsheets and MAR

## 2021-11-08 ENCOUNTER — VIRTUAL VISIT (OUTPATIENT)
Dept: PULMONOLOGY | Age: 31
End: 2021-11-08
Payer: MEDICARE

## 2021-11-08 DIAGNOSIS — M32.0 DRUG-INDUCED SYSTEMIC LUPUS ERYTHEMATOSUS, UNSPECIFIED ORGAN INVOLVEMENT STATUS (HCC): ICD-10-CM

## 2021-11-08 DIAGNOSIS — G47.33 OSA (OBSTRUCTIVE SLEEP APNEA): Primary | ICD-10-CM

## 2021-11-08 DIAGNOSIS — M79.7 FIBROMYALGIA: Chronic | ICD-10-CM

## 2021-11-08 DIAGNOSIS — G47.19 EXCESSIVE DAYTIME SLEEPINESS: ICD-10-CM

## 2021-11-08 PROCEDURE — 99214 OFFICE O/P EST MOD 30 MIN: CPT | Performed by: NURSE PRACTITIONER

## 2021-11-08 RX ORDER — MODAFINIL 200 MG/1
200 TABLET ORAL EVERY MORNING
Qty: 30 TABLET | Refills: 2 | Status: SHIPPED | OUTPATIENT
Start: 2021-11-08 | End: 2021-12-08

## 2021-11-08 ASSESSMENT — SLEEP AND FATIGUE QUESTIONNAIRES
HOW LIKELY ARE YOU TO NOD OFF OR FALL ASLEEP WHILE SITTING INACTIVE IN A PUBLIC PLACE: 2
HOW LIKELY ARE YOU TO NOD OFF OR FALL ASLEEP WHILE SITTING QUIETLY AFTER LUNCH WITHOUT ALCOHOL: 2
HOW LIKELY ARE YOU TO NOD OFF OR FALL ASLEEP IN A CAR, WHILE STOPPED FOR A FEW MINUTES IN TRAFFIC: 1
ESS TOTAL SCORE: 17
HOW LIKELY ARE YOU TO NOD OFF OR FALL ASLEEP WHILE SITTING AND READING: 3
HOW LIKELY ARE YOU TO NOD OFF OR FALL ASLEEP WHILE SITTING AND TALKING TO SOMEONE: 0
HOW LIKELY ARE YOU TO NOD OFF OR FALL ASLEEP WHILE LYING DOWN TO REST IN THE AFTERNOON WHEN CIRCUMSTANCES PERMIT: 3
HOW LIKELY ARE YOU TO NOD OFF OR FALL ASLEEP WHILE WATCHING TV: 3
HOW LIKELY ARE YOU TO NOD OFF OR FALL ASLEEP WHEN YOU ARE A PASSENGER IN A CAR FOR AN HOUR WITHOUT A BREAK: 3

## 2021-11-08 NOTE — PROGRESS NOTES
Dana Crawford         : 1990    Diagnosis: [x] LEOLA (G47.33) [] CSA (G47.31) [] Apnea (G47.30)   Length of Need: [x] 12 Months [] 99 Months [] Other:    Machine (TON!): [] Respironics Dream Station   2   Auto [] ResMed AirSense     Auto [] Other:     []  CPAP () [] Bilevel ()   Mode: [] Auto [] Spontaneous    Mode: [] Auto [] Spontaneous            Comfort Settings:   - Ramp Pressure:  cmH2O                                        - Ramp time: 15 min                                     -  Flex/EPR - 3 full time                                    - For ResMed Bilevel (TiMax-4 sec   TiMin- 0.2 sec)     Humidifier: [x] Heated ()        [x] Water chamber replacement ()/ 1 per 6 months        Mask:   [x] Nasal () /1 per 3 months [] Full Face () /1 per 3 months   [x] Patient choice -Size and fit mask [] Patient Choice - Size and fit mask   [] Dispense:  [] Dispense:    [x] Headgear () / 1 per 3 months [] Headgear () / 1 per 3 months   [x] Replacement Nasal Cushion ()/2 per month [] Interface Replacement ()/1 per month   [] Replacement Nasal Pillows ()/2 per month         Tubing: [x] Heated ()/1 per 3 months    [] Standard ()/1 per 3 months [] Other:           Filters: [x] Non-disposable ()/1 per 6 months     [x] Ultra-Fine, Disposable ()/2 per month        Miscellaneous: [] Chin Strap ()/ 1 per 6 months [] O2 bleed-in:       LPM   [] Oximetry on CPAP/Bilevel []  Other:    [x] Modem: ()         Start Order Date: 21    MEDICAL JUSTIFICATION:  I, the undersigned, certify that the above prescribed supplies are medically necessary for this patients wellbeing. In my opinion, the supplies are both reasonable and necessary in reference to accepted standards of medicalpractice in treatment of this patients condition.     Leward Apgar, APRN - CNP      NPI: [id number]       Order Signed Date: 21    Electronically signed by Bello Vizcaino, APRN - CNP on 2021 at 2:04 PM    [facility] EMERGENCY SERVICE  1990  [address]  [phone] (home)   [phone] (mobile)      Insurance Info (confirm with patient if correct):  Payor/Plan Subscr  Sex Relation Sub.  Ins. ID Effective Group Num

## 2021-12-01 ENCOUNTER — NURSE ONLY (OUTPATIENT)
Dept: RHEUMATOLOGY | Age: 31
End: 2021-12-01
Payer: MEDICARE

## 2021-12-01 ENCOUNTER — OFFICE VISIT (OUTPATIENT)
Dept: RHEUMATOLOGY | Age: 31
End: 2021-12-01
Payer: MEDICARE

## 2021-12-01 VITALS
RESPIRATION RATE: 16 BRPM | BODY MASS INDEX: 23.47 KG/M2 | HEART RATE: 68 BPM | DIASTOLIC BLOOD PRESSURE: 68 MMHG | SYSTOLIC BLOOD PRESSURE: 114 MMHG | TEMPERATURE: 97.8 F | WEIGHT: 145.4 LBS

## 2021-12-01 DIAGNOSIS — M32.9 SYSTEMIC LUPUS ERYTHEMATOSUS, UNSPECIFIED SLE TYPE, UNSPECIFIED ORGAN INVOLVEMENT STATUS (HCC): Primary | ICD-10-CM

## 2021-12-01 DIAGNOSIS — Z79.899 HIGH RISK MEDICATION USE: ICD-10-CM

## 2021-12-01 DIAGNOSIS — I73.00 RAYNAUD'S DISEASE WITHOUT GANGRENE: ICD-10-CM

## 2021-12-01 DIAGNOSIS — M79.7 FIBROMYALGIA: ICD-10-CM

## 2021-12-01 LAB
ALT SERPL-CCNC: 16 U/L (ref 10–40)
AST SERPL-CCNC: 19 U/L (ref 15–37)
BASOPHILS ABSOLUTE: 0 K/UL (ref 0–0.2)
BASOPHILS RELATIVE PERCENT: 0.7 %
C-REACTIVE PROTEIN: <3 MG/L (ref 0–5.1)
CREAT SERPL-MCNC: 0.8 MG/DL (ref 0.6–1.1)
EOSINOPHILS ABSOLUTE: 0.1 K/UL (ref 0–0.6)
EOSINOPHILS RELATIVE PERCENT: 2.3 %
GFR AFRICAN AMERICAN: >60
GFR NON-AFRICAN AMERICAN: >60
HCT VFR BLD CALC: 39 % (ref 36–48)
HEMOGLOBIN: 12.8 G/DL (ref 12–16)
LYMPHOCYTES ABSOLUTE: 1.4 K/UL (ref 1–5.1)
LYMPHOCYTES RELATIVE PERCENT: 40.9 %
MCH RBC QN AUTO: 28.2 PG (ref 26–34)
MCHC RBC AUTO-ENTMCNC: 32.7 G/DL (ref 31–36)
MCV RBC AUTO: 86.2 FL (ref 80–100)
MONOCYTES ABSOLUTE: 0.3 K/UL (ref 0–1.3)
MONOCYTES RELATIVE PERCENT: 8.7 %
NEUTROPHILS ABSOLUTE: 1.6 K/UL (ref 1.7–7.7)
NEUTROPHILS RELATIVE PERCENT: 47.4 %
PDW BLD-RTO: 16.4 % (ref 12.4–15.4)
PLATELET # BLD: 202 K/UL (ref 135–450)
PMV BLD AUTO: 8.8 FL (ref 5–10.5)
RBC # BLD: 4.53 M/UL (ref 4–5.2)
SEDIMENTATION RATE, ERYTHROCYTE: 8 MM/HR (ref 0–20)
WBC # BLD: 3.4 K/UL (ref 4–11)

## 2021-12-01 PROCEDURE — 36415 COLL VENOUS BLD VENIPUNCTURE: CPT | Performed by: INTERNAL MEDICINE

## 2021-12-01 PROCEDURE — 99214 OFFICE O/P EST MOD 30 MIN: CPT | Performed by: INTERNAL MEDICINE

## 2021-12-01 PROCEDURE — 96413 CHEMO IV INFUSION 1 HR: CPT | Performed by: INTERNAL MEDICINE

## 2021-12-01 RX ORDER — SODIUM CHLORIDE 0.9 % (FLUSH) 0.9 %
5 SYRINGE (ML) INJECTION PRN
Status: CANCELLED | OUTPATIENT
Start: 2021-12-22

## 2021-12-01 RX ORDER — AMLODIPINE BESYLATE 2.5 MG/1
2.5 TABLET ORAL DAILY
Qty: 90 TABLET | Refills: 1 | Status: SHIPPED | OUTPATIENT
Start: 2021-12-01 | End: 2021-12-08

## 2021-12-01 RX ORDER — SODIUM CHLORIDE 9 MG/ML
INJECTION, SOLUTION INTRAVENOUS CONTINUOUS
Status: CANCELLED | OUTPATIENT
Start: 2021-12-22

## 2021-12-01 RX ORDER — HEPARIN SODIUM (PORCINE) LOCK FLUSH IV SOLN 100 UNIT/ML 100 UNIT/ML
500 SOLUTION INTRAVENOUS PRN
Status: CANCELLED | OUTPATIENT
Start: 2021-12-22

## 2021-12-01 RX ORDER — DIPHENHYDRAMINE HYDROCHLORIDE 50 MG/ML
50 INJECTION INTRAMUSCULAR; INTRAVENOUS ONCE
Status: CANCELLED | OUTPATIENT
Start: 2021-12-22 | End: 2021-12-22

## 2021-12-01 RX ORDER — SODIUM CHLORIDE 0.9 % (FLUSH) 0.9 %
10 SYRINGE (ML) INJECTION PRN
Status: CANCELLED | OUTPATIENT
Start: 2021-12-22

## 2021-12-01 RX ORDER — ACETAMINOPHEN 325 MG/1
650 TABLET ORAL ONCE
Status: CANCELLED | OUTPATIENT
Start: 2021-12-22

## 2021-12-01 RX ORDER — EPINEPHRINE 1 MG/ML
0.3 INJECTION, SOLUTION, CONCENTRATE INTRAVENOUS PRN
Status: CANCELLED | OUTPATIENT
Start: 2021-12-22

## 2021-12-01 RX ORDER — METHYLPREDNISOLONE SODIUM SUCCINATE 40 MG/ML
40 INJECTION, POWDER, LYOPHILIZED, FOR SOLUTION INTRAMUSCULAR; INTRAVENOUS ONCE
Status: CANCELLED | OUTPATIENT
Start: 2021-12-22 | End: 2021-12-22

## 2021-12-01 NOTE — PROGRESS NOTES
Pt here for Benlysta infusion #11. Follow up visit with Dr. Leonard Ferris and labs today: cbc, creatinine, crp, AST, ALT, and sed rate drawn. No  Adverse effects from previous infusion. Today 145  lbs =66 kg   X 10 mg/kg = 660 mg, Rounded to 720 mg per MD.  Remains therapeutic on this dose. No dose change. Infusion  tolerated well. Next visit scheduled  in 4 weeks.     IV Gauge: #22  IV Site: left antecubital  # of Attempts: 1  IV Start: 0845 am  IV Stop: 0945 am      See Therapy Plan, flowsheets and MAR

## 2021-12-01 NOTE — PROGRESS NOTES
[address] [place] [address], MD                                                           [address] [place] [address], [place] [address] (M) [phone] (F)      Dear Dr. Ely Maki MD:  Please find Rheumatology assessment. Thank you for giving me the opportunity to be involved in Fabrice Mcbride's care and I look forward following Fabrice Paul along with you. If you have any questions or concerns please feel free to reach me. Note is transcribed using voice recognition software. Inadvertent computerized transcription errors may be present. Patient identification: Fabrice Mcbride,: 1990,31 y.o. Sex: female     A/P  Diagnoses and all orders for this visit:    Systemic lupus erythematosus, unspecified SLE type, unspecified organ involvement status ([facility] [address]    High risk medication use    Fibromyalgia    Raynaud's disease without gangrene         Background-   SLE-diagnosis -based on arthritis, photosensitivity, photosensitive rash, patchy alopecia, possible pleuritis. Abnormal labs including positive IRIS, double-stranded DNA, SSA, depressed C3-C4. Also has noninflammatory discomfort from fibromyalgia causing generalized musculoskeletal pain. Tried several medications-most of them for short duration-Imuran, methotrexate-leukopenia, CellCept-some kind of blood abnormalities, hydroxychloroquine-abnormal eye exam per patient, has been maintained on Benlysta IV therapy for about 5 years, last infusion was in 2020. She does not notice any change in her symptoms. She is on Cymbalta for fibromyalgia. Is on disability because of PTSD and lupus. A/PToday-  1. Fibromyalgia- doing better on Lyrica 50 mg twice daily.     2.  Lupus-no major or nodules. HEENT: Normal lids, lacrimal glands and pupils. No oral or nasal ulcers. Salivary glands reveal no evidence of abnormality. External inspection of the ears and nose within normal limits. Neck: No adenopathy or thyroid enlargement. DATA:   Lab Results   Component Value Date    WBC 4.7 10/06/2021    HGB 12.4 10/06/2021    HCT 38.9 10/06/2021    MCV 84.5 10/06/2021     10/06/2021         Chemistry        Component Value Date/Time     01/22/2021 0854    K 3.7 01/22/2021 0854     01/22/2021 0854    CO2 28 01/22/2021 0854    BUN 15 01/22/2021 0854    CREATININE 0.8 10/06/2021 0833        Component Value Date/Time    CALCIUM 9.7 01/22/2021 0854    ALKPHOS 79 01/22/2021 0854    AST 18 10/06/2021 0833    ALT 17 10/06/2021 0833    BILITOT 0.4 01/22/2021 0854          No results found for: [facility]  Lab Results   Component Value Date    CRP <3.0 10/06/2021     Lab Results   Component Value Date    IRIS POSITIVE (A) 01/22/2021    SEDRATE 5 10/06/2021     No results found for: CKTOTAL  Normal protein creatinine ratio from 10/20/2021  Total time 31 minutes that includes the following-  Preparing to see the patient such as reviewing patients records, pre-charting, preparing the visit on the same day, performing a medically appropriate history and physical examination, counseling and educating patient about diagnosis, management plan, ordering appropriate testings, prescriptions, communicating findings to other care providers, and documenting clinical information in electronic medical record. A/P- See above.

## 2021-12-01 NOTE — PATIENT INSTRUCTIONS
To avoid any disruptions in your treatment, we ask that you please notify us immediately if you have any insurance changes such as a new plan or if you might be losing coverage. If the infusion nurse does not receive your new insurance information, your infusion may be temporarily held until we can check your benefits and get an authorization from your new insurance. Please notify us BEFORE your next infusion, as it may take several weeks to check your benefits and for your new insurance to approve your continuing treatment. Please know that we want to help you in anyway we can, so notify us early! If your insurance coverage changes in ANY way you must do the follow:   Contact our office and speak with the Infusion Nurse. All calls will be returned within 24 hours, except on weekends. We will need a copy of both the front and back of your new card.  Infusion appointments will be placed on HOLD until a new Verification of Benefits and/or Prior Authorization (if required) is acquired   a. A NEW VOB (verification of benefits) must be requested to verify coverage for the medication and infusion fees. b. If a Prior Authorization is needed, we will obtain this and provide information needed to your insurance. c. This process can take 2-3 weeks depending on insurance.  Once the new VOB has been obtained the Infusion RN or a member of the Rheumatology staff will contact you to discuss changes in coverage. A new infusion schedule can be made at this time.  Changes in insurance may disqualify you from receiving infusion coverage, however, we will do everything we can to get your treatment, as we know it is necessary for your disease and quality of living. *As a reminder, anyone on Patient Copay Assistance (Commercial insurance ONLY. This does NOT pertain to Medicare or Medicaid). You should have received information from the drug  regarding how the copay programs work.   If you have not

## 2021-12-08 ENCOUNTER — HOSPITAL ENCOUNTER (EMERGENCY)
Age: 31
Discharge: HOME | End: 2021-12-08
Attending: EMERGENCY MEDICINE
Payer: MEDICARE

## 2021-12-08 VITALS
RESPIRATION RATE: 14 BRPM | SYSTOLIC BLOOD PRESSURE: 131 MMHG | WEIGHT: 146.19 LBS | BODY MASS INDEX: 23.49 KG/M2 | DIASTOLIC BLOOD PRESSURE: 86 MMHG | OXYGEN SATURATION: 97 % | TEMPERATURE: 98.8 F | HEIGHT: 66 IN | HEART RATE: 101 BPM

## 2021-12-08 DIAGNOSIS — N93.9 ABNORMAL VAGINAL BLEEDING: Primary | ICD-10-CM

## 2021-12-08 LAB
ANION GAP SERPL CALCULATED.3IONS-SCNC: 8 MMOL/L (ref 3–16)
BACTERIA: ABNORMAL /HPF
BASOPHILS ABSOLUTE: 0.1 K/UL (ref 0–0.2)
BASOPHILS RELATIVE PERCENT: 2.3 %
BILIRUBIN URINE: NEGATIVE
BLOOD, URINE: ABNORMAL
BUN BLDV-MCNC: 13 MG/DL (ref 7–20)
CALCIUM SERPL-MCNC: 8.6 MG/DL (ref 8.3–10.6)
CHLORIDE BLD-SCNC: 108 MMOL/L (ref 99–110)
CLARITY: CLEAR
CO2: 26 MMOL/L (ref 21–32)
COLOR: YELLOW
CREAT SERPL-MCNC: 0.8 MG/DL (ref 0.6–1.1)
EOSINOPHILS ABSOLUTE: 0.1 K/UL (ref 0–0.6)
EOSINOPHILS RELATIVE PERCENT: 2.1 %
EPITHELIAL CELLS, UA: ABNORMAL /HPF (ref 0–5)
GFR AFRICAN AMERICAN: >60
GFR NON-AFRICAN AMERICAN: >60
GLUCOSE BLD-MCNC: 103 MG/DL (ref 70–99)
GLUCOSE URINE: NEGATIVE MG/DL
HCG QUALITATIVE: NEGATIVE
HCT VFR BLD CALC: 39.3 % (ref 36–48)
HEMOGLOBIN: 13.2 G/DL (ref 12–16)
INR BLD: 1 (ref 0.88–1.12)
KETONES, URINE: NEGATIVE MG/DL
LEUKOCYTE ESTERASE, URINE: NEGATIVE
LYMPHOCYTES ABSOLUTE: 0.6 K/UL (ref 1–5.1)
LYMPHOCYTES RELATIVE PERCENT: 14.4 %
MCH RBC QN AUTO: 28.6 PG (ref 26–34)
MCHC RBC AUTO-ENTMCNC: 33.6 G/DL (ref 31–36)
MCV RBC AUTO: 85.1 FL (ref 80–100)
MICROSCOPIC EXAMINATION: YES
MONOCYTES ABSOLUTE: 0.3 K/UL (ref 0–1.3)
MONOCYTES RELATIVE PERCENT: 5.9 %
MUCUS: ABNORMAL /LPF
NEUTROPHILS ABSOLUTE: 3.4 K/UL (ref 1.7–7.7)
NEUTROPHILS RELATIVE PERCENT: 75.3 %
NITRITE, URINE: NEGATIVE
PDW BLD-RTO: 16.4 % (ref 12.4–15.4)
PH UA: 6 (ref 5–8)
PLATELET # BLD: 202 K/UL (ref 135–450)
PMV BLD AUTO: 7.8 FL (ref 5–10.5)
POTASSIUM REFLEX MAGNESIUM: 3.8 MMOL/L (ref 3.5–5.1)
PROTEIN UA: NEGATIVE MG/DL
PROTHROMBIN TIME: 11.3 SEC (ref 9.9–12.7)
RBC # BLD: 4.61 M/UL (ref 4–5.2)
RBC UA: ABNORMAL /HPF (ref 0–4)
SODIUM BLD-SCNC: 142 MMOL/L (ref 136–145)
SPECIFIC GRAVITY UA: >=1.03 (ref 1–1.03)
URINE TYPE: ABNORMAL
UROBILINOGEN, URINE: 0.2 E.U./DL
WBC # BLD: 4.5 K/UL (ref 4–11)
WBC UA: ABNORMAL /HPF (ref 0–5)

## 2021-12-08 PROCEDURE — 99284 EMERGENCY DEPT VISIT MOD MDM: CPT

## 2021-12-08 PROCEDURE — 84703 CHORIONIC GONADOTROPIN ASSAY: CPT

## 2021-12-08 PROCEDURE — 85025 COMPLETE CBC W/AUTO DIFF WBC: CPT

## 2021-12-08 PROCEDURE — 6360000002 HC RX W HCPCS: Performed by: EMERGENCY MEDICINE

## 2021-12-08 PROCEDURE — 96374 THER/PROPH/DIAG INJ IV PUSH: CPT

## 2021-12-08 PROCEDURE — 85610 PROTHROMBIN TIME: CPT

## 2021-12-08 PROCEDURE — 80048 BASIC METABOLIC PNL TOTAL CA: CPT

## 2021-12-08 PROCEDURE — 96375 TX/PRO/DX INJ NEW DRUG ADDON: CPT

## 2021-12-08 PROCEDURE — 81001 URINALYSIS AUTO W/SCOPE: CPT

## 2021-12-08 RX ORDER — KETOROLAC TROMETHAMINE 30 MG/ML
30 INJECTION, SOLUTION INTRAMUSCULAR; INTRAVENOUS ONCE
Status: COMPLETED | OUTPATIENT
Start: 2021-12-08 | End: 2021-12-08

## 2021-12-08 RX ORDER — MORPHINE SULFATE 4 MG/ML
4 INJECTION, SOLUTION INTRAMUSCULAR; INTRAVENOUS ONCE
Status: COMPLETED | OUTPATIENT
Start: 2021-12-08 | End: 2021-12-08

## 2021-12-08 RX ADMIN — MORPHINE SULFATE 4 MG: 4 INJECTION INTRAVENOUS at 11:48

## 2021-12-08 RX ADMIN — KETOROLAC TROMETHAMINE 30 MG: 30 INJECTION, SOLUTION INTRAMUSCULAR; INTRAVENOUS at 10:34

## 2021-12-08 ASSESSMENT — PAIN DESCRIPTION - DESCRIPTORS
DESCRIPTORS: CRAMPING
DESCRIPTORS: ACHING

## 2021-12-08 ASSESSMENT — PAIN SCALES - GENERAL
PAINLEVEL_OUTOF10: 7
PAINLEVEL_OUTOF10: 8

## 2021-12-08 ASSESSMENT — PAIN DESCRIPTION - LOCATION
LOCATION: VAGINA
LOCATION: VAGINA

## 2021-12-08 ASSESSMENT — PAIN DESCRIPTION - PAIN TYPE
TYPE: ACUTE PAIN;CHRONIC PAIN
TYPE: ACUTE PAIN;CHRONIC PAIN

## 2021-12-08 NOTE — ED NOTES
Patient to ed with complaints of continued vaginal bleeding d/t irregular menstral cycles with cramping, patient following with GYN and was not able to tolerate the medication prescribed.      Rosette South RN  12/08/21 1000

## 2021-12-08 NOTE — ED PROVIDER NOTES
Emergency Department Provider Note  Location: [address] [place] [address]  12/8/2021     Patient Identification  Maximino Mcknight is a 28 y.o. female    Chief Complaint  Vaginal Bleeding      Mode of Arrival  private car    HPI  (History provided by patient)  This is a 28 y.o. female with a PMH significant for lupus presented today for irregular menstrual bleeding. Patient states since last year, she has had very irregular menstrual bleeding. She often bleeds 20-30+ days. Her menstrual period is getting heavier and more painful. She has seen OB/GYN, endocrinologist, and rheumatologist and \"no one knows what's wrong. \"  She states she had a pelvic ultrasound in February 2021. She was initially put on levothyroxine which helped somewhat. Starting the summer, she started having very heavy bleed again. They tried birth control pills but patient could not tolerate the nausea even at the lowest dose. Patient states she is on iron pill. She has chronic lightheadedness and not sure if that is getting worse. She states she has syncope issues even before her irregular menstrual period started so that is not unusual for her. ROS  Review of Systems   Constitutional: Negative for chills and fever. HENT: Negative for congestion. Eyes: Negative for visual disturbance. Respiratory: Negative for shortness of breath. Cardiovascular: Negative for chest pain. Gastrointestinal: Positive for abdominal pain and nausea. Negative for blood in stool, constipation and vomiting. Genitourinary: Positive for vaginal bleeding. Negative for dysuria and frequency. Musculoskeletal: Positive for gait problem (chronic, needs to walk with a cane). Skin: Negative for color change. Neurological: Positive for light-headedness. I have reviewed the following nursing documentation:  Allergies:    Allergies   Allergen Reactions    Latex Hives, Itching, Rash and Swelling    Adhesive Tape     Pcn [Penicillins] hydrOXYzine (ATARAX) 50 MG tablet Take 50 mg by mouth 3 times daily as needed for Anxiety     Historical Provider, MD   mometasone (ELOCON) 0.1 % cream Apply topically daily. 1/5/21   Kiana Barnes MD       Social history:  reports that she quit smoking about 4 years ago. Her smoking use included cigarettes. She started smoking about 6 years ago. She smoked 0.20 packs per day. She has quit using smokeless tobacco. She reports previous drug use. She reports that she does not drink alcohol. Family history:    Family History   Problem Relation Age of Onset    Hypertension Mother     Thyroid Disease Mother     Depression Father     Sleep Apnea Father     Depression Sister     Anxiety Disorder Sister     Migraines Sister     Glaucoma Maternal Grandmother     Heart Attack Maternal Grandfather     Heart Disease Maternal Grandfather     Cancer Paternal Grandmother     Cancer Paternal Grandfather        Exam  ED Triage Vitals [12/08/21 0939]   BP Temp Temp Source Pulse Resp SpO2 Height Weight   131/86 98.8 °F (37.1 °C) Oral 99 18 100 % 5' 6\" (1.676 m) 146 lb 3 oz (66.3 kg)   Physical Exam  Vitals and nursing note reviewed. Exam conducted with a chaperone present. Constitutional:       General: She is not in acute distress. Appearance: Normal appearance. She is well-developed. She is not diaphoretic. HENT:      Head: Normocephalic and atraumatic. Eyes:      General: Lids are normal. No scleral icterus. Right eye: No discharge. Left eye: No discharge. Conjunctiva/sclera: Conjunctivae normal.      Pupils: Pupils are equal, round, and reactive to light. Neck:      Trachea: No tracheal deviation. Cardiovascular:      Rate and Rhythm: Normal rate and regular rhythm. Heart sounds: Normal heart sounds. No murmur heard. Pulmonary:      Effort: Pulmonary effort is normal. No respiratory distress. Breath sounds: Normal breath sounds. No stridor. No wheezing.    Abdominal: 33.6 31.0 - 36.0 g/dL    RDW 16.4 (H) 12.4 - 15.4 %    Platelets 860 904 - 445 K/uL    MPV 7.8 5.0 - 10.5 fL    Neutrophils % 75.3 %    Lymphocytes % 14.4 %    Monocytes % 5.9 %    Eosinophils % 2.1 %    Basophils % 2.3 %    Neutrophils Absolute 3.4 1.7 - 7.7 K/uL    Lymphocytes Absolute 0.6 (L) 1.0 - 5.1 K/uL    Monocytes Absolute 0.3 0.0 - 1.3 K/uL    Eosinophils Absolute 0.1 0.0 - 0.6 K/uL    Basophils Absolute 0.1 0.0 - 0.2 K/uL   Basic Metabolic Panel w/ Reflex to MG   Result Value Ref Range    Sodium 142 136 - 145 mmol/L    Potassium reflex Magnesium 3.8 3.5 - 5.1 mmol/L    Chloride 108 99 - 110 mmol/L    CO2 26 21 - 32 mmol/L    Anion Gap 8 3 - 16    Glucose 103 (H) 70 - 99 mg/dL    BUN 13 7 - 20 mg/dL    CREATININE 0.8 0.6 - 1.1 mg/dL    GFR Non-African American >60 >60    GFR African American >60 >60    Calcium 8.6 8.3 - 10.6 mg/dL   Protime-INR   Result Value Ref Range    Protime 11.3 9.9 - 12.7 sec    INR 1.00 0.88 - 1.12   Urinalysis, reflex to microscopic   Result Value Ref Range    Color, UA Yellow Straw/Yellow    Clarity, UA Clear Clear    Glucose, Ur Negative Negative mg/dL    Bilirubin Urine Negative Negative    Ketones, Urine Negative Negative mg/dL    Specific Gravity, UA >=1.030 1.005 - 1.030    Blood, Urine MODERATE (A) Negative    pH, UA 6.0 5.0 - 8.0    Protein, UA Negative Negative mg/dL    Urobilinogen, Urine 0.2 <2.0 E.U./dL    Nitrite, Urine Negative Negative    Leukocyte Esterase, Urine Negative Negative    Microscopic Examination YES     Urine Type NotGiven    HCG Qualitative, Serum   Result Value Ref Range    hCG Qual Negative Detects HCG level >10 MIU/mL   Microscopic Urinalysis   Result Value Ref Range    Mucus, UA 2+ (A) None Seen /LPF    WBC, UA 0-2 0 - 5 /HPF    RBC, UA 0-2 0 - 4 /HPF    Epithelial Cells, UA 2-5 0 - 5 /HPF    Bacteria, UA 1+ (A) None Seen /HPF         - Patient seen and evaluated in room 15.  28 y.o. female presented for irregular and heavy vaginal bleeding.  Exam (66.3 kg), SpO2 97 %, not currently breastfeeding. Disposition referral (if applicable): Your OB/Gyn physician at [address] [place] [address],[address] an appointment as soon as possible for a visit in 3 days      This chart was generated in part by using Dragon Dictation system and may contain errors related to that system including errors in grammar, punctuation, and spelling, as well as words and phrases that may be inappropriate. If there are any questions or concerns please feel free to contact the dictating provider for clarification.      José Miguel Dillon MD  [address] [place] [address] Rebeka Amanda MD  12/09/21 4569

## 2021-12-08 NOTE — ED NOTES
Patient given discharge instructions verbal and written, patient verbalized understanding. Alert/oriented X4, Clear speech.   Patient exhibits no distress, ambulates with steady gait per self leaving unit, no further request.     Doni Real RN  12/08/21 5341

## 2021-12-09 ASSESSMENT — ENCOUNTER SYMPTOMS
VOMITING: 0
BLOOD IN STOOL: 0
NAUSEA: 1
SHORTNESS OF BREATH: 0
ABDOMINAL PAIN: 1
COLOR CHANGE: 0
CONSTIPATION: 0

## 2021-12-18 RX ORDER — LEVOTHYROXINE SODIUM 0.05 MG/1
TABLET ORAL
Qty: 90 TABLET | Refills: 0 | Status: SHIPPED | OUTPATIENT
Start: 2021-12-18 | End: 2022-06-20 | Stop reason: SDUPTHER

## 2021-12-29 ENCOUNTER — NURSE ONLY (OUTPATIENT)
Dept: RHEUMATOLOGY | Age: 31
End: 2021-12-29
Payer: MEDICARE

## 2021-12-29 VITALS
SYSTOLIC BLOOD PRESSURE: 126 MMHG | DIASTOLIC BLOOD PRESSURE: 68 MMHG | RESPIRATION RATE: 16 BRPM | TEMPERATURE: 98.2 F | WEIGHT: 148.6 LBS | HEART RATE: 68 BPM | BODY MASS INDEX: 23.98 KG/M2

## 2021-12-29 DIAGNOSIS — Z79.899 HIGH RISK MEDICATION USE: ICD-10-CM

## 2021-12-29 DIAGNOSIS — M32.9 SYSTEMIC LUPUS ERYTHEMATOSUS, UNSPECIFIED SLE TYPE, UNSPECIFIED ORGAN INVOLVEMENT STATUS (HCC): Primary | ICD-10-CM

## 2021-12-29 PROCEDURE — 96413 CHEMO IV INFUSION 1 HR: CPT | Performed by: INTERNAL MEDICINE

## 2021-12-29 RX ORDER — EPINEPHRINE 1 MG/ML
0.3 INJECTION, SOLUTION, CONCENTRATE INTRAVENOUS PRN
Status: CANCELLED | OUTPATIENT
Start: 2022-01-19

## 2021-12-29 RX ORDER — ACETAMINOPHEN 325 MG/1
650 TABLET ORAL ONCE
Status: CANCELLED | OUTPATIENT
Start: 2022-01-19

## 2021-12-29 RX ORDER — SODIUM CHLORIDE 9 MG/ML
INJECTION, SOLUTION INTRAVENOUS CONTINUOUS
Status: CANCELLED | OUTPATIENT
Start: 2022-01-19

## 2021-12-29 RX ORDER — SODIUM CHLORIDE 0.9 % (FLUSH) 0.9 %
10 SYRINGE (ML) INJECTION PRN
Status: CANCELLED | OUTPATIENT
Start: 2022-01-19

## 2021-12-29 RX ORDER — HEPARIN SODIUM (PORCINE) LOCK FLUSH IV SOLN 100 UNIT/ML 100 UNIT/ML
500 SOLUTION INTRAVENOUS PRN
Status: CANCELLED | OUTPATIENT
Start: 2022-01-19

## 2021-12-29 RX ORDER — METHYLPREDNISOLONE SODIUM SUCCINATE 40 MG/ML
40 INJECTION, POWDER, LYOPHILIZED, FOR SOLUTION INTRAMUSCULAR; INTRAVENOUS ONCE
Status: CANCELLED | OUTPATIENT
Start: 2022-01-19 | End: 2022-01-19

## 2021-12-29 RX ORDER — DIPHENHYDRAMINE HYDROCHLORIDE 50 MG/ML
50 INJECTION INTRAMUSCULAR; INTRAVENOUS ONCE
Status: CANCELLED | OUTPATIENT
Start: 2022-01-19 | End: 2022-01-19

## 2021-12-29 RX ORDER — SODIUM CHLORIDE 0.9 % (FLUSH) 0.9 %
5 SYRINGE (ML) INJECTION PRN
Status: CANCELLED | OUTPATIENT
Start: 2022-01-19

## 2021-12-29 NOTE — PROGRESS NOTES
12/29/2021  Pt here for Benlysta infusion #12. No  Adverse effects from previous infusion. Today 148 lbs =67 kg   X 10 mg/kg = 670 mg, Rounded to 720 mg per MD.  Infusion  tolerated well. Next visit scheduled  in 4 weeks.     IV Gauge: #22  IV Site: left antecubital  # of Attempts: 1  IV Start: 0840 am  IV Stop: 0940 am    See Therapy Plan, flowsheets and MAR  Administrations This Visit     belimumab (BENLYSTA) 672 mg in sodium chloride 0.9 % 250 mL infusion     Admin Date  12/29/2021  08:40 Action  New Bag Dose  720 mg Rate  250 mL/hr Route  IntraVENous Site   Administered By  Nancy Salmeron RN    Ordering Provider: Caroline Camargo MD    Patient Supplied?: No

## 2021-12-29 NOTE — PATIENT INSTRUCTIONS
To avoid any disruptions in your treatment, we ask that you please notify us immediately if you have any insurance changes such as a new plan or if you might be losing coverage. If the infusion nurse does not receive your new insurance information, your infusion may be temporarily held until we can check your benefits and get an authorization from your new insurance. Please notify us BEFORE your next infusion, as it may take several weeks to check your benefits and for your new insurance to approve your continuing treatment. Please know that we want to help you in anyway we can, so notify us early! If your insurance coverage changes in ANY way you must do the follow:   Contact our office and speak with the Infusion Nurse. All calls will be returned within 24 hours, except on weekends. We will need a copy of both the front and back of your new card.  Infusion appointments will be placed on HOLD until a new Verification of Benefits and/or Prior Authorization (if required) is acquired   a. A NEW VOB (verification of benefits) must be requested to verify coverage for the medication and infusion fees. b. If a Prior Authorization is needed, we will obtain this and provide information needed to your insurance. c. This process can take 2-3 weeks depending on insurance.  Once the new VOB has been obtained the Infusion RN or a member of the Rheumatology staff will contact you to discuss changes in coverage. A new infusion schedule can be made at this time.  Changes in insurance may disqualify you from receiving infusion coverage, however, we will do everything we can to get your treatment, as we know it is necessary for your disease and quality of living. *As a reminder, anyone on Patient Copay Assistance (Commercial insurance ONLY. This does NOT pertain to Medicare or Medicaid). You should have received information from the drug  regarding how the copay programs work.   If you have not received the information, please let us know. You are responsible for sending in the Explanation of Benefits (EOBs) and [employer] for dates of service to your individual Savings Program and then following thru to ensure it was approved. If you are having difficulty understanding the program, please reach out immediately for assistance. If these are not sent in a timely manner, you will be responsible for the large portion that your insurance doesn't cover, which can be in the thousands! These charges can add up quickly and we don't want you to be stuck with large bills or your treatment be stopped for non-payment of the medication. *Reminder Appointments are schedule one appointment ahead if you come every 6-8 weeks, and 2 ahead if you come every 4 weeks. Please know that we cannot infuse if the physician is not present, so if we cannot accommodate your needs, please feel free to ask what other options are available. The nurse will do the best to accommodate your needs , there may be times that this is not always possible. Please be patient as we try to accommodate patients in a safe, and timely manner. If the physician is out, be aware that you may need to go to our [facility] to have your treatment in a timely manner. If you have questions, please speak with a member of the Rheumatology staff. We are here to help and guide you through this infusion process and ensure that you get the treatment that you need. Thank you for letting us take care of you! Thank you for your cooperation and help! We are grateful to serve you!

## 2022-01-26 ENCOUNTER — NURSE ONLY (OUTPATIENT)
Dept: INFUSION THERAPY | Age: 32
End: 2022-01-26
Payer: MEDICARE

## 2022-01-26 VITALS
BODY MASS INDEX: 23.89 KG/M2 | WEIGHT: 148 LBS | SYSTOLIC BLOOD PRESSURE: 118 MMHG | HEART RATE: 88 BPM | DIASTOLIC BLOOD PRESSURE: 72 MMHG | RESPIRATION RATE: 18 BRPM | TEMPERATURE: 98.2 F

## 2022-01-26 DIAGNOSIS — Z79.899 HIGH RISK MEDICATION USE: ICD-10-CM

## 2022-01-26 DIAGNOSIS — M32.9 SYSTEMIC LUPUS ERYTHEMATOSUS, UNSPECIFIED SLE TYPE, UNSPECIFIED ORGAN INVOLVEMENT STATUS (HCC): Primary | ICD-10-CM

## 2022-01-26 DIAGNOSIS — M32.0 DRUG-INDUCED SYSTEMIC LUPUS ERYTHEMATOSUS, UNSPECIFIED ORGAN INVOLVEMENT STATUS (HCC): ICD-10-CM

## 2022-01-26 PROCEDURE — 96413 CHEMO IV INFUSION 1 HR: CPT | Performed by: INTERNAL MEDICINE

## 2022-01-26 RX ORDER — ACETAMINOPHEN 325 MG/1
650 TABLET ORAL ONCE
Status: DISCONTINUED | OUTPATIENT
Start: 2022-01-26 | End: 2022-01-26 | Stop reason: HOSPADM

## 2022-01-26 RX ORDER — SODIUM CHLORIDE 0.9 % (FLUSH) 0.9 %
5 SYRINGE (ML) INJECTION PRN
Status: CANCELLED | OUTPATIENT
Start: 2022-02-16

## 2022-01-26 RX ORDER — EPINEPHRINE 1 MG/ML
0.3 INJECTION, SOLUTION, CONCENTRATE INTRAVENOUS PRN
Status: CANCELLED | OUTPATIENT
Start: 2022-02-16

## 2022-01-26 RX ORDER — ACETAMINOPHEN 325 MG/1
650 TABLET ORAL ONCE
Status: CANCELLED | OUTPATIENT
Start: 2022-02-16

## 2022-01-26 RX ORDER — SODIUM CHLORIDE 9 MG/ML
INJECTION, SOLUTION INTRAVENOUS CONTINUOUS
Status: DISCONTINUED | OUTPATIENT
Start: 2022-01-26 | End: 2022-01-26 | Stop reason: HOSPADM

## 2022-01-26 RX ORDER — SODIUM CHLORIDE 9 MG/ML
INJECTION, SOLUTION INTRAVENOUS CONTINUOUS
Status: CANCELLED | OUTPATIENT
Start: 2022-02-16

## 2022-01-26 RX ORDER — METHYLPREDNISOLONE SODIUM SUCCINATE 125 MG/2ML
40 INJECTION, POWDER, LYOPHILIZED, FOR SOLUTION INTRAMUSCULAR; INTRAVENOUS ONCE
Status: CANCELLED | OUTPATIENT
Start: 2022-02-16 | End: 2022-02-16

## 2022-01-26 RX ORDER — DIPHENHYDRAMINE HYDROCHLORIDE 50 MG/ML
50 INJECTION INTRAMUSCULAR; INTRAVENOUS ONCE
Status: CANCELLED | OUTPATIENT
Start: 2022-02-16 | End: 2022-02-16

## 2022-01-26 RX ORDER — SODIUM CHLORIDE 0.9 % (FLUSH) 0.9 %
10 SYRINGE (ML) INJECTION PRN
Status: CANCELLED | OUTPATIENT
Start: 2022-02-16

## 2022-01-26 RX ORDER — HEPARIN SODIUM (PORCINE) LOCK FLUSH IV SOLN 100 UNIT/ML 100 UNIT/ML
500 SOLUTION INTRAVENOUS PRN
Status: CANCELLED | OUTPATIENT
Start: 2022-02-16

## 2022-01-26 NOTE — PATIENT INSTRUCTIONS
Mandy Smith is every 28 days (4 weeks). abatacept  Pronunciation:  a BAY ta sept  Brand:  Karthikeyan  What is the most important information I should know about abatacept? Follow all directions on your medicine label and package. Tell each of your healthcare providers about all your medical conditions, allergies, and all medicines you use. What is abatacept? Abatacept is used to treat symptoms of rheumatoid arthritis, and to prevent joint damage caused by these conditions. This medicine is for adults and children at least 3years old. Abatacept is also used to treat active psoriatic arthritis in adults. Abatacept is not a cure for any autoimmune disorder and will only treat the symptoms of your condition. Abatacept may also be used for purposes not listed in this medication guide. What should I discuss with my healthcare provider before using abatacept? You should not use abatacept if you are allergic to it. Before using abatacept, tell your doctor if you have ever had tuberculosis, if anyone in your household has tuberculosis, or if you have recently traveled to an area where tuberculosis is common. Tell your doctor if you have ever had:  · a weak immune system;  · any type of infection including a skin infection or open sores;  · infections that go away and come back;  · COPD (chronic obstructive pulmonary disease);  · diabetes;  · hepatitis; or  · if you are scheduled to receive any vaccines. Using abatacept may increase your risk of developing certain types of cancer such as lymphoma (cancer of the lymph nodes). This risk may be greater in older adults. Talk to your doctor about your specific risk. Tell your doctor if you are pregnant or breastfeeding. If you are pregnant, your name may be listed on a pregnancy registry to track the effects of abatacept on the baby. Children using abatacept should be current on all childhood immunizations before starting treatment. How should I use abatacept?   Before you start treatment with abatacept, your doctor may perform tests to make sure you do not have tuberculosis or other infections. Abatacept is injected under the skin, or as an infusion into a vein. A healthcare provider will give your first dose and may teach you how to properly use the medication by yourself. Abatacept is injected under the skin when given to a child between 3and 10years old. Abatacept must be given slowly when injected into a vein, and the IV infusion can take at least 30 minutes to complete. This medicine is usually given every 1 to 4 weeks. Follow your doctor's instructions. Abatacept must be mixed with a liquid (diluent) before using it. When using injections by yourself, be sure you understand how to properly mix and store the medicine. Read and carefully follow any Instructions for Use provided with your medicine. Ask your doctor or pharmacist if you don't understand all instructions. Prepare an injection only when you are ready to give it. Gently swirl but do not shake the medication bottle. Do not use if the medicine looks cloudy, has changed colors, or has particles in it. Call your pharmacist for new medicine. Each vial (bottle) or prefilled syringe is for one use only. Throw it away after one use, even if there is still medicine left inside. Use a needle and syringe only once and then place them in a puncture-proof \"sharps\" container. Follow state or local laws about how to dispose of this container. Keep it out of the reach of children and pets. If you need surgery, tell the surgeon ahead of time that you are using abatacept. If you've ever had hepatitis B, using abatacept can cause this virus to become active or get worse. You may need frequent liver function tests while using this medicine and for several months after you stop. Abatacept can cause false results with certain blood glucose tests, showing high blood sugar readings.  If you have diabetes, talk to your doctor about the best way to test your blood sugar. Autoimmune disorders are often treated with a combination of different drugs. Use all medications as directed and read all medication guides you receive. Do not change your dose or dosing schedule without your doctor's advice. Store abatacept in original carton in a refrigerator. Protect from light and do not freeze. Do not use after the expiration date on the medicine label has passed. If you need to travel with your medicine, place the syringes in a cooler with ice packs. Abatacept mixed with a diluent may be stored in a refrigerator or at room temperature and must be used within 24 hours. What happens if I miss a dose? Call your doctor for instructions if you miss your abatacept dose. What happens if I overdose? Seek emergency medical attention or call the Poison Help line at 1-227.879.9724. What should I avoid while using abatacept? Do not receive a \"live\" vaccine while using abatacept, and for at least 3 months after your treatment ends. The vaccine may not work as well during this time, and may not fully protect you from disease. Live vaccines include measles, mumps, rubella (MMR), rotavirus, typhoid, yellow fever, varicella (chickenpox), zoster (shingles), and nasal flu (influenza) vaccine. Avoid being near people who are sick or have infections. Tell your doctor at once if you develop signs of infection. What are the possible side effects of abatacept? Get emergency medical help if you have signs of an allergic reaction:  hives; difficulty breathing; swelling of your face, lips, tongue, or throat. Some side effects may occur during the injection. Tell your caregiver right away if you feel dizzy, light-headed, itchy, or have a severe headache or trouble breathing within 1 hour after receiving the injection. You may get infections more easily, even serious or fatal infections.  Call your doctor right away if you have signs of infection such as:  · fever, chills, night sweats, flu symptoms, weight loss;  · feeling very tired;  · dry cough, sore throat; or  · warmth, pain, or redness of your skin. Call your doctor at once if you have any of these other serious side effects:  · trouble breathing;  · stabbing chest pain, wheezing, cough with yellow or green mucus;  · pain or burning when you urinate; or  · signs of skin infection such as itching, swelling, warmth, redness, or oozing. Common side effects may include:  · fever;  · nausea, diarrhea, stomach pain;  · headache; or  · cold symptoms such as stuffy nose, sneezing, sore throat, cough. This is not a complete list of side effects and others may occur. Call your doctor for medical advice about side effects. You may report side effects to FDA at 6-647-FDA-2273. What other drugs will affect abatacept? Tell your doctor about all your other medicines, especially:  · adalimumab;  · anakinra;  · certolizumab;  · etanercept;  · golimumab;  · infliximab;  · rituximab; or  · tocilizumab. This list is not complete. Other drugs may affect abatacept, including prescription and over-the-counter medicines, vitamins, and herbal products. Not all possible drug interactions are listed here. Where can I get more information? Your doctor or pharmacist can provide more information about abatacept. Remember, keep this and all other medicines out of the reach of children, never share your medicines with others, and use this medication only for the indication prescribed. Every effort has been made to ensure that the information provided by [address] is accurate, up-to-date, and complete, but no guarantee is made to that effect. Drug information contained herein may be time sensitive.  [employer] information has been compiled for use by healthcare practitioners and consumers in the [facility] and therefore [employer] does not warrant that uses outside of the [facility] are appropriate, unless specifically indicated otherwise. [facility] drug information does not endorse drugs, diagnose patients or recommend therapy. [facility] drug information is an informational resource designed to assist licensed healthcare practitioners in caring for their patients and/or to serve consumers viewing this service as a supplement to, and not a substitute for, the expertise, skill, knowledge and judgment of healthcare practitioners. The absence of a warning for a given drug or drug combination in no way should be construed to indicate that the drug or drug combination is safe, effective or appropriate for any given patient. [facility] does not assume any responsibility for any aspect of healthcare administered with the aid of information [facility] provides. The information contained herein is not intended to cover all possible uses, directions, precautions, warnings, drug interactions, allergic reactions, or adverse effects. If you have questions about the drugs you are taking, check with your doctor, nurse or pharmacist.  Copyright 8459-9688 [address]. Version: 9.01. Revision date: 11/2/2020. Care instructions adapted under license by [facility] ([facility]). If you have questions about a medical condition or this instruction, always ask your healthcare professional. [place] [address] any warranty or liability for your use of this information. To avoid any disruptions in your treatment, we ask that you please notify us immediately if you have ANY insurance changes such as a new plan, new plan number, new group number, or if you might be losing coverage. If the infusion nurse does not receive your new insurance information, your infusion may be temporarily held until we can check your benefits and get an authorization from your new insurance. Please notify us BEFORE your next infusion, as it may take several weeks to check your benefits and for your new insurance to approve your continuing treatment.   Please know that we want to help you in anyway we can, so notify us early! If your insurance coverage changes in ANY way you must do the follow:   Contact our office and speak with the Infusion Nurse. All calls will be returned within 48 hours, except on weekends. We will need a copy of both the front and back of your new card.  Infusion appointments will be placed on HOLD until a new Verification of Benefits and/or Prior Authorization (if required) is acquired   a. A NEW VOB (verification of benefits) must be requested to verify coverage for the medication and infusion fees. b. If a Prior Authorization is needed, we will obtain this and provide information needed to your insurance. c. This process can take 3-4 weeks depending on insurance.  Once the new VOB has been obtained the Infusion RN or a member of the Rheumatology staff will contact you to discuss changes in coverage. A new infusion schedule can be made at this time.  Changes in insurance may disqualify you from receiving infusion coverage, however, we will do everything we can to get your treatment, as we know it is necessary for your disease and quality of living. Reminder insurance requires medication to be given at every 4 weeks, and will Not reimburse for early treatments. Please Schedule appointments accordingly. *Co-payment and deductible is due at time of Service. *Reminder Appointments are schedule one appointment ahead if you come every 6-8 weeks, and 2 ahead if you come every 4 weeks. Please know that we cannot infuse if the physician is not present, so if we cannot accommodate your needs, please feel free to ask what other options are available. The nurse will do the best to accommodate your needs , there may be times that this is not always possible. Please be patient as we try to accommodate patients in a safe, and timely manner.  If the physician is out, be aware that you may need to go to our [facility], located on [facility], to have your treatment in a timely manner. If you have questions, please speak with a member of the Rheumatology staff. We are here to help and guide you through this infusion process and ensure that you get the treatment that you need. Thank you for letting us take care of you! Thank you for your cooperation and help! We are grateful to serve you! 2

## 2022-01-26 NOTE — PROGRESS NOTES
Pt here for Benlysta infusion #13. No  Adverse effects from previous infusion. Today 148 lbs =67 kg   X 10 mg/kg = 670 mg, Rounded to 720 mg per MD.  Infusion  tolerated well. Next visit scheduled  in 4 weeks.     IV Gauge: #24  IV Site: left forearm (pt preference)  # of Attempts: 1  IV Start: 0845 am  IV Stop: 1005 am          See Therapy Plan, flowsheets and MAR  Administrations This Visit     belimumab (BENLYSTA) 672 mg in sodium chloride 0.9 % 250 mL infusion     Admin Date  01/26/2022  08:45 Action  New Bag Dose  720 mg Rate  250 mL/hr Route  IntraVENous Site   Administered By  Maxwell Mcdonald RN    Ordering Provider: Kalyan Woods MD    Patient Supplied?: No    Comments: dose per md

## 2022-02-07 ENCOUNTER — VIRTUAL VISIT (OUTPATIENT)
Dept: PULMONOLOGY | Age: 32
End: 2022-02-07
Payer: MEDICARE

## 2022-02-07 DIAGNOSIS — G47.19 EXCESSIVE DAYTIME SLEEPINESS: ICD-10-CM

## 2022-02-07 DIAGNOSIS — G47.33 OSA (OBSTRUCTIVE SLEEP APNEA): Primary | ICD-10-CM

## 2022-02-07 DIAGNOSIS — F41.1 GAD (GENERALIZED ANXIETY DISORDER): Chronic | ICD-10-CM

## 2022-02-07 DIAGNOSIS — F51.04 PSYCHOPHYSIOLOGICAL INSOMNIA: ICD-10-CM

## 2022-02-07 DIAGNOSIS — E03.8 OTHER SPECIFIED HYPOTHYROIDISM: Chronic | ICD-10-CM

## 2022-02-07 DIAGNOSIS — M79.7 FIBROMYALGIA: Chronic | ICD-10-CM

## 2022-02-07 PROCEDURE — 99214 OFFICE O/P EST MOD 30 MIN: CPT | Performed by: NURSE PRACTITIONER

## 2022-02-07 RX ORDER — ATOMOXETINE 10 MG/1
10 CAPSULE ORAL 2 TIMES DAILY
COMMUNITY
End: 2022-04-20 | Stop reason: ALTCHOICE

## 2022-02-07 RX ORDER — DULOXETIN HYDROCHLORIDE 60 MG/1
CAPSULE, DELAYED RELEASE ORAL
Qty: 90 CAPSULE | Refills: 1 | Status: SHIPPED | OUTPATIENT
Start: 2022-02-07 | End: 2022-08-30

## 2022-02-07 ASSESSMENT — SLEEP AND FATIGUE QUESTIONNAIRES
HOW LIKELY ARE YOU TO NOD OFF OR FALL ASLEEP WHILE SITTING QUIETLY AFTER LUNCH WITHOUT ALCOHOL: 0
HOW LIKELY ARE YOU TO NOD OFF OR FALL ASLEEP WHILE WATCHING TV: 2
HOW LIKELY ARE YOU TO NOD OFF OR FALL ASLEEP WHILE LYING DOWN TO REST IN THE AFTERNOON WHEN CIRCUMSTANCES PERMIT: 2
HOW LIKELY ARE YOU TO NOD OFF OR FALL ASLEEP IN A CAR, WHILE STOPPED FOR A FEW MINUTES IN TRAFFIC: 0
ESS TOTAL SCORE: 7
HOW LIKELY ARE YOU TO NOD OFF OR FALL ASLEEP WHILE SITTING AND READING: 2
HOW LIKELY ARE YOU TO NOD OFF OR FALL ASLEEP WHEN YOU ARE A PASSENGER IN A CAR FOR AN HOUR WITHOUT A BREAK: 1
HOW LIKELY ARE YOU TO NOD OFF OR FALL ASLEEP WHILE SITTING AND TALKING TO SOMEONE: 0
HOW LIKELY ARE YOU TO NOD OFF OR FALL ASLEEP WHILE SITTING INACTIVE IN A PUBLIC PLACE: 0

## 2022-02-07 NOTE — PROGRESS NOTES
Diagnosis: [x] LEOLA (G47.33) [] CSA (G47.31) [] Apnea (G47.30)   Length of Need: [x] 15 Months [] 99 Months [] Other:   Machine (TON!): [] Respironics Dream Station      Auto [] ResMed AirSense     Auto [] Other:     []  CPAP () [] Bilevel ()   Mode: [] Auto [] Spontaneous    Mode: [] Auto [] Spontaneous             Comfort Settings:      Humidifier: [] Heated ()        [x] Water chamber replacement ()/ 1 per 6 months        Mask:   [] Nasal () /1 per 3 months [x] Full Face () /1 per 3 months   [] Patient choice -Size and fit mask [x] Patient Choice - Size and fit mask   [] Dispense: [] Dispense:   [] Headgear () / 1 per 3 months [x] Headgear () / 1 per 3 months   [] Replacement Nasal Cushion ()/2 per month [x] Interface Replacement ()/1 per month   [] Replacement Nasal Pillows ()/2 per month         Tubing: [x] Heated ()/1 per 3 months    [] Standard ()/1 per 3 months [] Other:           Filters: [x] Non-disposable ()/1 per 6 months     [x] Ultra-Fine, Disposable ()/2 per month        Miscellaneous: [] Chin Strap ()/ 1 per 6 months [] O2 bleed-in:        LPM   [] Oxymetry on CPAP/Bilevel []  Other:         Start Order Date: 02/07/22    MEDICAL JUSTIFICATION:  I, the undersigned, certify that the above prescribed supplies are medically necessary for this patients wellbeing. In my opinion, the supplies are both reasonable and necessary in reference to accepted standards of medicalpractice in treatment of this patients condition. Desean Hollis NP    NPI: [id number]       Order Signed Date: 02/07/22  [address] [facility]  Pulmonary, Sleep, and Critical Care    Pulmonary, Sleep, and Critical Care  [facility] [address] [place] [address].  [address] [facility], [address] [facility] , [address] [facility] [address], [place]  Phone: [phone]    Fax: [address] [employer]  1990  [address]  [phone] (home)   [phone] (mobile)      Insurance Info (confirm with patient if correct):  Payor/Plan Subscr  Sex Relation Sub.  Ins. ID Effective Group Num

## 2022-02-07 NOTE — ASSESSMENT & PLAN NOTE
Chronic- Stable. Discussed the importance of treating obstructive sleep apnea as part of the management of this disorder. Cont any meds per PCP and other physicians. Taking Trazodone 50mg to help her fall asleep. Medication managed by her PCP. She denies any side effects and symptoms are controlled at the current dose.

## 2022-02-07 NOTE — LETTER
[facility]  [id number] [facility]  [address] [address]  Phone: [phone]  Fax: [phone]    February 7, 2022       Patient: Mame Celestin   MR Number: [id number]   YOB: 1990   Date of Visit: 2/7/2022       Mame Celestin was seen for a follow up visit today. Here is my assessment and plan as well as an attached copy of [address] E[address] Mcbride's visit today:    Other specified hypothyroidism  Chronic- Stable. Discussed the importance of treating obstructive sleep apnea as part of the management of this disorder. Cont any meds per PCP and other physicians. Fibromyalgia  Chronic- Stable. Discussed the importance of treating obstructive sleep apnea as part of the management of this disorder. Cont any meds per PCP and other physicians. LEONEL (generalized anxiety disorder)  Chronic- Stable. Discussed the importance of treating obstructive sleep apnea as part of the management of this disorder. Cont any meds per PCP and other physicians. Systemic lupus erythematosus (HCC)   Chronic- Stable. Discussed the importance of treating obstructive sleep apnea as part of the management of this disorder. Cont any meds per PCP and other physicians. Excessive daytime sleepiness   Chronic- Improving: Discussed the importance of treating obstructive sleep apnea as part of the management of this disorder. Cont any meds per PCP and other physicians. Taking Strattera 10 mg BID. Newly started in the past two weeks by psychiatry. LEOLA (obstructive sleep apnea)  Chronic-Stable: Reviewed and analyzed results of physiologic download from patient's machine and reviewed with patient. Supplies and parts as needed for [employer]. These are medically necessary. Limit caffeine use after 3pm. Based on the analyzed data will continue with current settings. Discussed more consistent use of her machine each night to assist in control of Excessive daytime sleepiness. Discussed adjusting the moisture settings on her machine, trying a nasal steroid, and/or switching to a FFM to help with nasal congestion. Continue working with psychiatry. Will see back in 6 months or sooner if issues arise. Psychophysiological insomnia  Chronic- Stable. Discussed the importance of treating obstructive sleep apnea as part of the management of this disorder. Cont any meds per PCP and other physicians. Taking Trazodone 50mg to help her fall asleep. Medication managed by her PCP. She denies any side effects and symptoms are controlled at the current dose. If you have questions or concerns, please do not hesitate to call me. I look forward to following Onalee Phalen along with you. Sincerely,    YOVANNY Perry    CC providers:  Jocelyn Sharp MD  [address]

## 2022-02-07 NOTE — ASSESSMENT & PLAN NOTE
Chronic- Improving: Discussed the importance of treating obstructive sleep apnea as part of the management of this disorder. Cont any meds per PCP and other physicians. Taking Strattera 10 mg BID. Newly started in the past two weeks by psychiatry.

## 2022-02-07 NOTE — PROGRESS NOTES
Jaylin Marin MD, [facility], [facility]  [address]  [address] [facility], [address] [phone]   [facility] [address] Elizabeth Gao. [address] [place] [address]. [address] [phone]     Video Visit- Follow up    Heri Rosenthal, was evaluated through a synchronous (real-time) audio-video  encounter. The patient (or guardian if applicable) is aware that this is a billable  service, which includes applicable co-pays. This Virtual Visit was conducted with  patient's (and/or legal guardian's) consent. The visit was conducted pursuant to  the emergency declaration under the [facility], [facility] authority and the [employer] and  [employer] General Act. Patient identification was verified,  and a caregiver was present when appropriate. The patient was located in a  state where the provider was licensed to provide care. Assessment/Plan:      1. LEOLA (obstructive sleep apnea)  Assessment & Plan:  Chronic-Stable: Reviewed and analyzed results of physiologic download from patient's machine and reviewed with patient. Supplies and parts as needed for [employer]. These are medically necessary. Limit caffeine use after 3pm. Based on the analyzed data will continue with current settings. Discussed more consistent use of her machine each night to assist in control of Excessive daytime sleepiness. Discussed adjusting the moisture settings on her machine, trying a nasal steroid, and/or switching to a FFM to help with nasal congestion. Continue working with psychiatry. Will see back in 6 months or sooner if issues arise. 2. Other specified hypothyroidism  Assessment & Plan:  Chronic- Stable. Discussed the importance of treating obstructive sleep apnea as part of the management of this disorder. Cont any meds per PCP and other physicians. 3. Fibromyalgia  Assessment & Plan:  Chronic- Stable.   Discussed the importance of treating obstructive sleep apnea as part of the management of this disorder. Cont any meds per PCP and other physicians. 4. LEONEL (generalized anxiety disorder)  Assessment & Plan:  Chronic- Stable. Discussed the importance of treating obstructive sleep apnea as part of the management of this disorder. Cont any meds per PCP and other physicians. 5. Excessive daytime sleepiness  Assessment & Plan:   Chronic- Improving: Discussed the importance of treating obstructive sleep apnea as part of the management of this disorder. Cont any meds per PCP and other physicians. Taking Strattera 10 mg BID. Newly started in the past two weeks by psychiatry. 6. Psychophysiological insomnia  Assessment & Plan:  Chronic- Stable. Discussed the importance of treating obstructive sleep apnea as part of the management of this disorder. Cont any meds per PCP and other physicians. Taking Trazodone 50mg to help her fall asleep. Medication managed by her PCP. She denies any side effects and symptoms are controlled at the current dose. Reviewed, analyzed, and documented physiologic data from patient's PAP machine. This information was analyzed to assess complexity and medical decision making in regards to further testing and management. The primary encounter diagnosis was LEOLA (obstructive sleep apnea). Diagnoses of Other specified hypothyroidism, Fibromyalgia, LEONEL (generalized anxiety disorder), Excessive daytime sleepiness, and Psychophysiological insomnia were also pertinent to this visit. The chronic medical conditions listed are directly related to the primary diagnosis listed above. The management of the primary diagnosis affects the secondary diagnosis and vice versa. Subjective:     Patient ID: Rea Anderson is a 28 y.o. adult.     Chief Complaint   Patient presents with    Sleep Apnea     Subjective   HPI:    Machine Modem/Download Info:  Compliance (hours/night): 6 hrs/night  % of nights >= 4 hrs: 79 %  Download AHI (/hour): 0.8 /HR  Average CPAP Pressure : 11.1 cmH2O      APAP - Settings  Pressure Min: 10 cmH2O  Pressure Max: 20 cmH2O                 Comfort Settings  Heated Tubing (Yes/No): Yes  Flex/EPR (0-3): 3 PAP Mask  Mask Type: Full Face mask  Mask Model: Airfit N20  Clinically Relevant Leak: No     Mame Isabel is doing well with her machine. She reports she was having trouble with feeling like she wasn't getting enough air. For about a week. She thinks it may have been due to a cold. She would like to try a FFM to use when she is having trouble with congestion. Taking trazodone 50mg nightly to help her fall asleep. She has been on this medication since she was about 14years old. Feels like she cannot sleep at night unless she takes it. She denies side effects. Pressure feels good, she is using a 45 min ramp. Still reports some issues with Excessive daytime sleepiness. She started seeing a psychiatrist and he put her on Strattera 10mg. She feels like the excessive daytime sleepiness is the best it has ever been but still not sure if it is completely working. She will follow up with psychiatry in 2 weeks. denies headaches, congestion, nosebleeds, dryness, aerophagia, or drowsiness while driving. DME Company - [employer]    [place] - Total score: 7    Current Outpatient Medications   Medication Instructions    Albuterol Sulfate (PROAIR HFA IN) 1 puff, Inhalation, EVERY 4 HOURS PRN    atomoxetine (STRATTERA) 10 mg, Oral, 2 TIMES DAILY    clotrimazole-betamethasone (LOTRISONE) 1-0.05 % cream Apply topically 2 times daily.  DULoxetine (CYMBALTA) 60 MG extended release capsule TAKE 1 CAPSULE BY MOUTH EVERY DAY    Erenumab-aooe 70 MG/ML SOAJ Inject 1 cc (70 mg) subcutaneously every 30 days    hydrOXYzine (ATARAX) 50 mg, Oral, 3 TIMES DAILY PRN    levothyroxine (SYNTHROID) 50 MCG tablet TAKE 1 TABLET BY MOUTH EVERY DAY    mometasone (ELOCON) 0.1 % cream Apply topically daily.     norethindrone (AYGESTIN) 5 mg, Oral, DAILY    pregabalin (LYRICA) 50 MG capsule Take 1 tab po BID    Rimegepant Sulfate (NURTEC) 75 MG TBDP Oral, PRN    traZODone (DESYREL) 50 mg, Oral, NIGHTLY        Electronically signed by YOVANNY Cross on 2/7/2022 at 5:00 PM

## 2022-02-08 ENCOUNTER — TELEPHONE (OUTPATIENT)
Dept: RHEUMATOLOGY | Age: 32
End: 2022-02-08

## 2022-02-09 NOTE — TELEPHONE ENCOUNTER
Pt has Reji Ramos and [place]. PA likely needed thru managed Medicare. Will request once VOB returned. Please hold on infusions until this is clarified and PA obtained.

## 2022-02-15 NOTE — TELEPHONE ENCOUNTER
Please obtain PA for Benlysta IV () under Medical Benefit.      Dose:10mg/kg every 4 weeks    Infusion code: 80232    Dx: M32.9    Breanna- scan in VOB once received

## 2022-02-18 NOTE — TELEPHONE ENCOUNTER
Received PA form from [facility]. I filled out the PA form and faxed the form back with clinicals.   Fax# [phone]  KT:[phone]

## 2022-02-23 ENCOUNTER — NURSE ONLY (OUTPATIENT)
Dept: INFUSION THERAPY | Age: 32
End: 2022-02-23

## 2022-02-23 VITALS
RESPIRATION RATE: 16 BRPM | SYSTOLIC BLOOD PRESSURE: 114 MMHG | TEMPERATURE: 97.6 F | BODY MASS INDEX: 23.89 KG/M2 | HEART RATE: 70 BPM | DIASTOLIC BLOOD PRESSURE: 60 MMHG | WEIGHT: 148 LBS

## 2022-02-23 DIAGNOSIS — Z79.899 HIGH RISK MEDICATION USE: ICD-10-CM

## 2022-02-23 DIAGNOSIS — M32.9 SYSTEMIC LUPUS ERYTHEMATOSUS, UNSPECIFIED SLE TYPE, UNSPECIFIED ORGAN INVOLVEMENT STATUS (HCC): Primary | ICD-10-CM

## 2022-02-23 RX ORDER — ACETAMINOPHEN 325 MG/1
650 TABLET ORAL ONCE
Status: CANCELLED | OUTPATIENT
Start: 2022-03-23

## 2022-02-23 RX ORDER — DIPHENHYDRAMINE HYDROCHLORIDE 50 MG/ML
50 INJECTION INTRAMUSCULAR; INTRAVENOUS ONCE
Status: CANCELLED | OUTPATIENT
Start: 2022-03-23 | End: 2022-03-23

## 2022-02-23 RX ORDER — SODIUM CHLORIDE 9 MG/ML
INJECTION, SOLUTION INTRAVENOUS CONTINUOUS
Status: CANCELLED | OUTPATIENT
Start: 2022-03-23

## 2022-02-23 RX ORDER — METHYLPREDNISOLONE SODIUM SUCCINATE 125 MG/2ML
40 INJECTION, POWDER, LYOPHILIZED, FOR SOLUTION INTRAMUSCULAR; INTRAVENOUS ONCE
Status: CANCELLED | OUTPATIENT
Start: 2022-03-23 | End: 2022-03-23

## 2022-02-23 RX ORDER — SODIUM CHLORIDE 0.9 % (FLUSH) 0.9 %
5 SYRINGE (ML) INJECTION PRN
Status: CANCELLED | OUTPATIENT
Start: 2022-03-23

## 2022-02-23 RX ORDER — HEPARIN SODIUM (PORCINE) LOCK FLUSH IV SOLN 100 UNIT/ML 100 UNIT/ML
500 SOLUTION INTRAVENOUS PRN
Status: CANCELLED | OUTPATIENT
Start: 2022-03-23

## 2022-02-23 RX ORDER — SODIUM CHLORIDE 0.9 % (FLUSH) 0.9 %
10 SYRINGE (ML) INJECTION PRN
Status: CANCELLED | OUTPATIENT
Start: 2022-03-23

## 2022-02-23 RX ORDER — EPINEPHRINE 1 MG/ML
0.3 INJECTION, SOLUTION, CONCENTRATE INTRAVENOUS PRN
Status: CANCELLED | OUTPATIENT
Start: 2022-03-23

## 2022-02-23 NOTE — PROGRESS NOTES
Pt here for Benlysta infusion #14. No  Adverse effects from previous infusion. Today 148 lbs =67 kg   X 10 mg/kg = 670 mg, Rounded to 720 mg per MD.  Infusion  tolerated well. Next visit scheduled  in 4 weeks.     IV Gauge: #24  IV Site: left forearm (pt request)  # of Attempts: 1  IV Start: 0840 am  IV Stop: 0940 am        See Therapy Plan, flowsheets and MAR  Administrations This Visit     belimumab (BENLYSTA) 672 mg in sodium chloride 0.9 % 250 mL infusion     Admin Date  02/23/2022  08:40 Action  New Bag Dose  720 mg Rate  250 mL/hr Route  IntraVENous Site   Administered By  Heather Randle RN    Ordering Provider: Bailey Mei MD    Patient Supplied?: No

## 2022-02-23 NOTE — PATIENT INSTRUCTIONS
belimumab  Pronunciation:  be CASTRO ue mab  Brand:  Benlysta  What is the most important information I should know about belimumab? Some people have had serious or fatal allergic reactions to this medicine within hours or days after an injection. Tell your doctor right away if you have symptoms such as headache, anxiety, rash, itching, swelling in your face or throat, nausea, trouble breathing, and feeling dizzy or light-headed. Belimumab affects your immune system. You may get infections more easily, even serious or fatal infections. Call your doctor if you have a fever, chills, cough with mucus, skin sores, warmth or redness under your skin, increased urination, or burning when you urinate. Report any new or worsening mental health symptoms to your doctor, such as: depression, mood or behavior changes, trouble sleeping, or thoughts about hurting yourself or others. What is belimumab? Belimumab is used together with other medicines to treat active systemic lupus erythematosus (SLE) in adults and children at least 11years old. Beliumumab is also used to treat kidney problems (active lupus nephritis) in adults with SLE who are using other lupus medication. Belimumab is not for use in people who have active SLE that affects the central nervous system (brain, nerves, and spinal cord). Belimumab may also be used for other purposes not listed in this medication guide. What should I discuss with my healthcare provider before using belimumab? You should not use belimumab if you are allergic to it. Tell your doctor if you have ever had:  · an active or chronic infection;  · depression or mental illness;  · suicidal thoughts or actions;  · cancer;  · a drug allergy;  · if you recently received a vaccine; or  · if you are using cyclophosphamide, biologic medicines, or other monoclonal antibody medicines. Belimumab may increase your risk of certain cancers by changing the way your immune system works.  Ask your doctor about your individual risk. Some people have thoughts about suicide while using belimumab. Your doctor will need to check your progress at regular visits. Your family or other caregivers should also be alert to changes in your mood or symptoms. Belimumab may affect the immune system of your baby if you use this medicine while you are pregnant. Use effective birth control to prevent pregnancy while you are using belimumab and for at least 4 months after your last dose. Tell your doctor if you become pregnant. Belimumab may affect your baby's immune system, but having SLE during pregnancy may cause complications such as worsened lupus, eclampsia (dangerously high blood pressure), premature birth, miscarriage, or growth problems in the unborn baby. SLE in the mother may also cause lupus or heart problems to develop in the . The benefit of treating SLE may outweigh any risks to the baby. If you are pregnant, your name may be listed on a pregnancy registry to track the effects of belimumab on the baby. Make sure any doctor caring for your  baby knows if you used belimumab while you were pregnant. It may not be safe to breastfeed while using this medicine. Ask your doctor about any risk. How is belimumab given? Follow all directions on your prescription label and read all medication guides or instruction sheets. Your doctor may occasionally change your dose. Use the medicine exactly as directed. Belimumab is given as an infusion into a vein, usually every 2 to 4 weeks. A healthcare provider will give you this injection. The medicine must be given slowly, and the infusion can take about 1 hour to complete. In adults, belimumab may also be injected under the skin, usually once weekly on the same day each week. A healthcare provider may teach you how to properly use the medication by yourself. Do not inject this medicine into skin that is bruised, tender, red, or hard.   If you give injections at home, read and carefully follow any Instructions for Use provided with your medicine. Ask your doctor or pharmacist if you don't understand all instructions. Prepare an injection only when you are ready to give it. Do not use if the medicine looks cloudy, has changed colors, or has particles in it. Call your pharmacist for new medicine. You may be given other medications to help prevent serious side effects or an allergic reaction. Keep using these medicines for as long as your doctor has prescribed. Store the prefilled syringe  or injection pen  in its original packaging in the refrigerator. Do not freeze or expose to light or high heat. Do not shake the medicine. Take the syringe or injection pen out of the refrigerator and let it reach room temperature for 30 minutes before injecting your dose. Do not use if the medicine has been left at room temperature longer than 12 hours. Do not put it back into the refrigerator. Call your pharmacist for new medicine. Each prefilled syringe or injection pen is for one use only. Throw it away after one use, even if there is still medicine left inside. Throw away used needles, syringes, or injection pens in a puncture-proof \"sharps\" container. Follow state or local laws about how to dispose of this container. Keep it out of the reach of children and pets. What happens if I miss a dose? Use the medicine as soon as you remember. You can either restart a weekly schedule based on the new injection day, or you can go back to your regular injection schedule. Do not use 2 injections on the same day. Call your doctor for instructions if you miss an appointment for an intravenous infusion of belimumab. What happens if I overdose? Seek emergency medical attention or call the Poison Help line at [phone]. What should I avoid while using belimumab? Avoid being near people who are sick or have infections. Tell your doctor at once if you develop signs of infection.   Do not receive a \"live\" vaccine while using belimumab. The vaccine may not work as well and may not fully protect you from disease. Live vaccines include measles, mumps, rubella (MMR), rotavirus, typhoid, yellow fever, varicella (chickenpox), zoster (shingles), and nasal flu (influenza) vaccine. What are the possible side effects of belimumab? Get emergency medical help if you have signs of an allergic reaction:  hives, itching; feeling anxious or light-headed; difficult breathing; swelling of your face, lips, tongue, or throat. Some people have had serious or fatal allergic reactions to this medicine within hours or days after an injection. Tell your doctor right away if you have symptoms such as muscle pain, headache, tiredness, slow heartbeats, rash, itching, swelling in your face or throat, anxiety, nausea, trouble breathing, and feeling dizzy or light-headed. You may get infections more easily, even serious or fatal infections. Stop using belimumab and call your doctor right away if you have signs of infection such as:  · fever, chills;  · skin sores, warmth, or redness;  · cough with mucus, chest pain, shortness of breath;  · pain or burning when you urinate;  · urinating more than usual; or  · bloody diarrhea. Belimumab may cause a serious brain infection that can lead to disability or death. Call your doctor right away if you have problems with speech, thought, vision, or muscle movement. These symptoms may start gradually and get worse quickly. Also call your doctor at once if you have new or worsening depression, anxiety, mood or behavior changes, trouble sleeping, risk-taking behavior, or thoughts about hurting yourself or others.   Common side effects may include:  · nausea, diarrhea;  · fever, sore throat, runny or stuffy nose, cough, chest tightness;  · pain, itching, redness, or swelling where an injection was given under the skin;  · pain in your arms or legs;  · headache, depressed mood; or  · sleep problems (insomnia). This is not a complete list of side effects and others may occur. Call your doctor for medical advice about side effects. You may report side effects to FDA at 0-548-SQH-4930. What other drugs will affect belimumab? Other drugs may affect belimumab, including prescription and over-the-counter medicines, vitamins, and herbal products. Tell your doctor about all your current medicines and any medicine you start or stop using. Where can I get more information? Your doctor or pharmacist can provide more information about belimumab. Remember, keep this and all other medicines out of the reach of children, never share your medicines with others, and use this medication only for the indication prescribed. Every effort has been made to ensure that the information provided by [address] is accurate, up-to-date, and complete, but no guarantee is made to that effect. Drug information contained herein may be time sensitive. [facility] information has been compiled for use by healthcare practitioners and consumers in the [place] and therefore [employer] does not warrant that uses outside of the [place] are appropriate, unless specifically indicated otherwise. [facility]'s drug information does not endorse drugs, diagnose patients or recommend therapy. [employer] drug information is an informational resource designed to assist licensed healthcare practitioners in caring for their patients and/or to serve consumers viewing this service as a supplement to, and not a substitute for, the expertise, skill, knowledge and judgment of healthcare practitioners. The absence of a warning for a given drug or drug combination in no way should be construed to indicate that the drug or drug combination is safe, effective or appropriate for any given patient. [facility] does not assume any responsibility for any aspect of healthcare administered with the aid of information [facility] provides.  The insurance.  Once the new VOB has been obtained the Infusion RN or a member of the Rheumatology staff will contact you to discuss changes in coverage. A new infusion schedule can be made at this time.  Changes in insurance may disqualify you from receiving infusion coverage, however, we will do everything we can to get your treatment, as we know it is necessary for your disease and quality of living.       *Co-payment and deductible is due at time of Service. *As a reminder, anyone on Patient Copay Assistance (Commercial insurance ONLY. This does NOT pertain to Medicare or Medicaid). You should have received information from the drug  regarding how the copay programs work. If you have not received the information, please let us know. You are responsible for sending in the Explanation of Benefits (EOBs) and [employer] for dates of service to your individual Savings Program and then following thru to ensure it was approved. If you are having difficulty understanding the program, please reach out immediately for assistance. If these are not sent in a timely manner, you will be responsible for the large portion that your insurance doesn't cover, which can be in the thousands! These charges can add up quickly and we don't want you to be stuck with large bills or your treatment be stopped for non-payment of the medication. *Reminder Appointments are schedule one appointment ahead if you come every 6-8 weeks, and 2 ahead if you come every 4 weeks. Please know that we cannot infuse if the physician is not present, so if we cannot accommodate your needs, please feel free to ask what other options are available. The nurse will do the best to accommodate your needs , there may be times that this is not always possible. Please be patient as we try to accommodate patients in a safe, and timely manner.  If the physician is out, be aware that you may need to go to our [facility], located on [facility], to have your treatment in a timely manner. If you have questions, please speak with a member of the Rheumatology staff. We are here to help and guide you through this infusion process and ensure that you get the treatment that you need. Thank you for letting us take care of you! Thank you for your cooperation and help! We are grateful to serve you!

## 2022-02-26 LAB
QUANTI TB GOLD PLUS: NEGATIVE
QUANTI TB1 MINUS NIL: 0.01 IU/ML (ref 0–0.34)
QUANTI TB2 MINUS NIL: 0.01 IU/ML (ref 0–0.34)
QUANTIFERON MITOGEN: >10 IU/ML
QUANTIFERON NIL: 0.03 IU/ML

## 2022-03-02 ENCOUNTER — TELEPHONE (OUTPATIENT)
Dept: INTERNAL MEDICINE CLINIC | Age: 32
End: 2022-03-02

## 2022-03-02 NOTE — TELEPHONE ENCOUNTER
[facility] called into the office to request a fax  Of the patients last 2 office notes, Mri report and ct scan. Pt is trying to become a patient at the facility. [phone]  Please advise.

## 2022-03-17 RX ORDER — LEVOTHYROXINE SODIUM 0.05 MG/1
TABLET ORAL
Qty: 90 TABLET | Refills: 0 | OUTPATIENT
Start: 2022-03-17

## 2022-03-17 NOTE — TELEPHONE ENCOUNTER
Medication:   Requested Prescriptions     Pending Prescriptions Disp Refills    levothyroxine (SYNTHROID) 50 MCG tablet [Pharmacy Med Name: LEVOTHYROXINE 50 MCG TABLET] 90 tablet 0     Sig: TAKE 1 TABLET BY MOUTH EVERY DAY       Last Filled:      Patient Phone Number: [phone] (home)     Last appt: 3/9/2021   Next appt: Visit date not found    Last Thyroid:   Lab Results   Component Value Date    FT3 2.1 10/06/2021    T4FREE 1.0 10/06/2021

## 2022-03-23 ENCOUNTER — NURSE ONLY (OUTPATIENT)
Dept: INFUSION THERAPY | Age: 32
End: 2022-03-23
Payer: MEDICARE

## 2022-03-23 ENCOUNTER — OFFICE VISIT (OUTPATIENT)
Dept: RHEUMATOLOGY | Age: 32
End: 2022-03-23
Payer: MEDICARE

## 2022-03-23 VITALS
WEIGHT: 152.4 LBS | HEART RATE: 70 BPM | BODY MASS INDEX: 24.6 KG/M2 | TEMPERATURE: 98.7 F | DIASTOLIC BLOOD PRESSURE: 70 MMHG | SYSTOLIC BLOOD PRESSURE: 118 MMHG | RESPIRATION RATE: 16 BRPM

## 2022-03-23 DIAGNOSIS — M79.7 FIBROMYALGIA: ICD-10-CM

## 2022-03-23 DIAGNOSIS — M32.9 SYSTEMIC LUPUS ERYTHEMATOSUS, UNSPECIFIED SLE TYPE, UNSPECIFIED ORGAN INVOLVEMENT STATUS (HCC): Primary | ICD-10-CM

## 2022-03-23 DIAGNOSIS — Z79.899 HIGH RISK MEDICATION USE: ICD-10-CM

## 2022-03-23 DIAGNOSIS — M54.50 CHRONIC BILATERAL LOW BACK PAIN WITHOUT SCIATICA: ICD-10-CM

## 2022-03-23 DIAGNOSIS — G89.29 CHRONIC BILATERAL LOW BACK PAIN WITHOUT SCIATICA: ICD-10-CM

## 2022-03-23 LAB
ALT SERPL-CCNC: 16 U/L (ref 10–40)
AST SERPL-CCNC: 31 U/L (ref 15–37)
BASOPHILS ABSOLUTE: 0 K/UL (ref 0–0.2)
BASOPHILS RELATIVE PERCENT: 0.9 %
C-REACTIVE PROTEIN: <3 MG/L (ref 0–5.1)
CREAT SERPL-MCNC: 1 MG/DL (ref 0.6–1.1)
EOSINOPHILS ABSOLUTE: 0.1 K/UL (ref 0–0.6)
EOSINOPHILS RELATIVE PERCENT: 4.3 %
GFR AFRICAN AMERICAN: >60
GFR NON-AFRICAN AMERICAN: >60
HCT VFR BLD CALC: 41.7 % (ref 36–48)
HEMOGLOBIN: 14.1 G/DL (ref 12–16)
LYMPHOCYTES ABSOLUTE: 1.3 K/UL (ref 1–5.1)
LYMPHOCYTES RELATIVE PERCENT: 41.5 %
MCH RBC QN AUTO: 29.6 PG (ref 26–34)
MCHC RBC AUTO-ENTMCNC: 33.8 G/DL (ref 31–36)
MCV RBC AUTO: 87.7 FL (ref 80–100)
MONOCYTES ABSOLUTE: 0.3 K/UL (ref 0–1.3)
MONOCYTES RELATIVE PERCENT: 9.5 %
NEUTROPHILS ABSOLUTE: 1.4 K/UL (ref 1.7–7.7)
NEUTROPHILS RELATIVE PERCENT: 43.8 %
PDW BLD-RTO: 12.9 % (ref 12.4–15.4)
PLATELET # BLD: 214 K/UL (ref 135–450)
PMV BLD AUTO: 7.9 FL (ref 5–10.5)
RBC # BLD: 4.76 M/UL (ref 4–5.2)
SEDIMENTATION RATE, ERYTHROCYTE: 8 MM/HR (ref 0–20)
WBC # BLD: 3.2 K/UL (ref 4–11)

## 2022-03-23 PROCEDURE — 96413 CHEMO IV INFUSION 1 HR: CPT | Performed by: INTERNAL MEDICINE

## 2022-03-23 PROCEDURE — 99214 OFFICE O/P EST MOD 30 MIN: CPT | Performed by: INTERNAL MEDICINE

## 2022-03-23 PROCEDURE — 36415 COLL VENOUS BLD VENIPUNCTURE: CPT | Performed by: INTERNAL MEDICINE

## 2022-03-23 RX ORDER — HEPARIN SODIUM (PORCINE) LOCK FLUSH IV SOLN 100 UNIT/ML 100 UNIT/ML
500 SOLUTION INTRAVENOUS PRN
Status: CANCELLED | OUTPATIENT
Start: 2022-04-20

## 2022-03-23 RX ORDER — DIPHENHYDRAMINE HYDROCHLORIDE 50 MG/ML
50 INJECTION INTRAMUSCULAR; INTRAVENOUS ONCE
Status: CANCELLED | OUTPATIENT
Start: 2022-04-20 | End: 2022-04-20

## 2022-03-23 RX ORDER — SODIUM CHLORIDE 9 MG/ML
INJECTION, SOLUTION INTRAVENOUS CONTINUOUS
Status: CANCELLED | OUTPATIENT
Start: 2022-04-20

## 2022-03-23 RX ORDER — SODIUM CHLORIDE 0.9 % (FLUSH) 0.9 %
10 SYRINGE (ML) INJECTION PRN
Status: CANCELLED | OUTPATIENT
Start: 2022-04-20

## 2022-03-23 RX ORDER — EPINEPHRINE 1 MG/ML
0.3 INJECTION, SOLUTION, CONCENTRATE INTRAVENOUS PRN
Status: CANCELLED | OUTPATIENT
Start: 2022-04-20

## 2022-03-23 RX ORDER — METHYLPREDNISOLONE SODIUM SUCCINATE 125 MG/2ML
40 INJECTION, POWDER, LYOPHILIZED, FOR SOLUTION INTRAMUSCULAR; INTRAVENOUS ONCE
Status: CANCELLED | OUTPATIENT
Start: 2022-04-20 | End: 2022-04-20

## 2022-03-23 RX ORDER — TIZANIDINE 4 MG/1
TABLET ORAL
Qty: 60 TABLET | Refills: 2 | Status: SHIPPED | OUTPATIENT
Start: 2022-03-23 | End: 2022-04-18

## 2022-03-23 RX ORDER — SODIUM CHLORIDE 0.9 % (FLUSH) 0.9 %
5 SYRINGE (ML) INJECTION PRN
Status: CANCELLED | OUTPATIENT
Start: 2022-04-20

## 2022-03-23 RX ORDER — ACETAMINOPHEN 325 MG/1
650 TABLET ORAL ONCE
Status: CANCELLED | OUTPATIENT
Start: 2022-04-20

## 2022-03-23 NOTE — PATIENT INSTRUCTIONS
belimumab  Pronunciation:  be CASTRO ue mab  Brand:  Benlysta  What is the most important information I should know about belimumab? Some people have had serious or fatal allergic reactions to this medicine within hours or days after an injection. Tell your doctor right away if you have symptoms such as headache, anxiety, rash, itching, swelling in your face or throat, nausea, trouble breathing, and feeling dizzy or light-headed. Belimumab affects your immune system. You may get infections more easily, even serious or fatal infections. Call your doctor if you have a fever, chills, cough with mucus, skin sores, warmth or redness under your skin, increased urination, or burning when you urinate. Report any new or worsening mental health symptoms to your doctor, such as: depression, mood or behavior changes, trouble sleeping, or thoughts about hurting yourself or others. What is belimumab? Belimumab is used together with other medicines to treat active systemic lupus erythematosus (SLE) in adults and children at least 11years old. Beliumumab is also used to treat kidney problems (active lupus nephritis) in adults with SLE who are using other lupus medication. Belimumab is not for use in people who have active SLE that affects the central nervous system (brain, nerves, and spinal cord). Belimumab may also be used for other purposes not listed in this medication guide. What should I discuss with my healthcare provider before using belimumab? You should not use belimumab if you are allergic to it. Tell your doctor if you have ever had:  · an active or chronic infection;  · depression or mental illness;  · suicidal thoughts or actions;  · cancer;  · a drug allergy;  · if you recently received a vaccine; or  · if you are using cyclophosphamide, biologic medicines, or other monoclonal antibody medicines. Belimumab may increase your risk of certain cancers by changing the way your immune system works.  Ask your doctor about your individual risk. Some people have thoughts about suicide while using belimumab. Your doctor will need to check your progress at regular visits. Your family or other caregivers should also be alert to changes in your mood or symptoms. Belimumab may affect the immune system of your baby if you use this medicine while you are pregnant. Use effective birth control to prevent pregnancy while you are using belimumab and for at least 4 months after your last dose. Tell your doctor if you become pregnant. Belimumab may affect your baby's immune system, but having SLE during pregnancy may cause complications such as worsened lupus, eclampsia (dangerously high blood pressure), premature birth, miscarriage, or growth problems in the unborn baby. SLE in the mother may also cause lupus or heart problems to develop in the . The benefit of treating SLE may outweigh any risks to the baby. If you are pregnant, your name may be listed on a pregnancy registry to track the effects of belimumab on the baby. Make sure any doctor caring for your  baby knows if you used belimumab while you were pregnant. It may not be safe to breastfeed while using this medicine. Ask your doctor about any risk. How is belimumab given? Follow all directions on your prescription label and read all medication guides or instruction sheets. Your doctor may occasionally change your dose. Use the medicine exactly as directed. Belimumab is given as an infusion into a vein, usually every 2 to 4 weeks. A healthcare provider will give you this injection. The medicine must be given slowly, and the infusion can take about 1 hour to complete. In adults, belimumab may also be injected under the skin, usually once weekly on the same day each week. A healthcare provider may teach you how to properly use the medication by yourself. Do not inject this medicine into skin that is bruised, tender, red, or hard.   If you give injections at receive a \"live\" vaccine while using belimumab. The vaccine may not work as well and may not fully protect you from disease. Live vaccines include measles, mumps, rubella (MMR), rotavirus, typhoid, yellow fever, varicella (chickenpox), zoster (shingles), and nasal flu (influenza) vaccine. What are the possible side effects of belimumab? Get emergency medical help if you have signs of an allergic reaction:  hives, itching; feeling anxious or light-headed; difficult breathing; swelling of your face, lips, tongue, or throat. Some people have had serious or fatal allergic reactions to this medicine within hours or days after an injection. Tell your doctor right away if you have symptoms such as muscle pain, headache, tiredness, slow heartbeats, rash, itching, swelling in your face or throat, anxiety, nausea, trouble breathing, and feeling dizzy or light-headed. You may get infections more easily, even serious or fatal infections. Stop using belimumab and call your doctor right away if you have signs of infection such as:  · fever, chills;  · skin sores, warmth, or redness;  · cough with mucus, chest pain, shortness of breath;  · pain or burning when you urinate;  · urinating more than usual; or  · bloody diarrhea. Belimumab may cause a serious brain infection that can lead to disability or death. Call your doctor right away if you have problems with speech, thought, vision, or muscle movement. These symptoms may start gradually and get worse quickly. Also call your doctor at once if you have new or worsening depression, anxiety, mood or behavior changes, trouble sleeping, risk-taking behavior, or thoughts about hurting yourself or others.   Common side effects may include:  · nausea, diarrhea;  · fever, sore throat, runny or stuffy nose, cough, chest tightness;  · pain, itching, redness, or swelling where an injection was given under the skin;  · pain in your arms or legs;  · headache, depressed mood; or  · sleep problems (insomnia). This is not a complete list of side effects and others may occur. Call your doctor for medical advice about side effects. You may report side effects to FDA at 0-103-QIE-1637. What other drugs will affect belimumab? Other drugs may affect belimumab, including prescription and over-the-counter medicines, vitamins, and herbal products. Tell your doctor about all your current medicines and any medicine you start or stop using. Where can I get more information? Your doctor or pharmacist can provide more information about belimumab. Remember, keep this and all other medicines out of the reach of children, never share your medicines with others, and use this medication only for the indication prescribed. Every effort has been made to ensure that the information provided by [address] is accurate, up-to-date, and complete, but no guarantee is made to that effect. Drug information contained herein may be time sensitive. [facility] information has been compiled for use by healthcare practitioners and consumers in the [place] and therefore [employer] does not warrant that uses outside of the [place] are appropriate, unless specifically indicated otherwise. [facility]'s drug information does not endorse drugs, diagnose patients or recommend therapy. The [facility] drug information is an informational resource designed to assist licensed healthcare practitioners in caring for their patients and/or to serve consumers viewing this service as a supplement to, and not a substitute for, the expertise, skill, knowledge and judgment of healthcare practitioners. The absence of a warning for a given drug or drug combination in no way should be construed to indicate that the drug or drug combination is safe, effective or appropriate for any given patient. [facility] does not assume any responsibility for any aspect of healthcare administered with the aid of information [facility] provides.  The information contained herein is not intended to cover all possible uses, directions, precautions, warnings, drug interactions, allergic reactions, or adverse effects. If you have questions about the drugs you are taking, check with your doctor, nurse or pharmacist.  Copyright 6857-2876 [employer] [address] [place] [address]. Version: 9.01. Revision date: 4/15/2021. Care instructions adapted under license by [facility] ([facility]). If you have questions about a medical condition or this instruction, always ask your healthcare professional. [place] [address] any warranty or liability for your use of this information. To avoid any disruptions in your treatment, we ask that you please notify us immediately if you have ANY insurance changes such as a new plan, new plan number, new group number, or if you might be losing coverage. If the infusion nurse does not receive your new insurance information, your infusion may be temporarily held until we can check your benefits and get an authorization from your new insurance. Please notify us BEFORE your next infusion, as it may take several weeks to check your benefits and for your new insurance to approve your continuing treatment. Please know that we want to help you in anyway we can, so notify us early! If your insurance coverage changes in ANY way you must do the follow:   Contact our office and speak with the Infusion Nurse. All calls will be returned within 48 hours, except on weekends. We will need a copy of both the front and back of your new card.  Infusion appointments will be placed on HOLD until a new Verification of Benefits and/or Prior Authorization (if required) is acquired   a. A NEW VOB (verification of benefits) must be requested to verify coverage for the medication and infusion fees. b. If a Prior Authorization is needed, we will obtain this and provide information needed to your insurance.   c. This process can take 3-4 weeks depending on insurance.  Once the new VOB has been obtained the Infusion RN or a member of the Rheumatology staff will contact you to discuss changes in coverage. A new infusion schedule can be made at this time.  Changes in insurance may disqualify you from receiving infusion coverage, however, we will do everything we can to get your treatment, as we know it is necessary for your disease and quality of living.       *Co-payment and deductible is due at time of Service. *As a reminder, anyone on Patient Copay Assistance (Commercial insurance ONLY. This does NOT pertain to Medicare or Medicaid). You should have received information from the drug  regarding how the copay programs work. If you have not received the information, please let us know. You are responsible for sending in the Explanation of Benefits (EOBs) and [employer] for dates of service to your individual Savings Program and then following thru to ensure it was approved. If you are having difficulty understanding the program, please reach out immediately for assistance. If these are not sent in a timely manner, you will be responsible for the large portion that your insurance doesn't cover, which can be in the thousands! These charges can add up quickly and we don't want you to be stuck with large bills or your treatment be stopped for non-payment of the medication. *Reminder Appointments are schedule one appointment ahead if you come every 6-8 weeks, and 2 ahead if you come every 4 weeks. Please know that we cannot infuse if the physician is not present, so if we cannot accommodate your needs, please feel free to ask what other options are available. The nurse will do the best to accommodate your needs , there may be times that this is not always possible. Please be patient as we try to accommodate patients in a safe, and timely manner.  If the physician is out, be aware that you may need to go to our [facility], located on [facility], to have your treatment in a timely manner. If you have questions, please speak with a member of the Rheumatology staff. We are here to help and guide you through this infusion process and ensure that you get the treatment that you need. Thank you for letting us take care of you! Thank you for your cooperation and help! We are grateful to serve you!

## 2022-03-23 NOTE — PROGRESS NOTES
3/23/2022  Pt here for Benlysta infusion #15, follow up visit with Dr. Gomez Reason and labs today: cbc, creatinine, crp, AST, ALT, and sed rate drawn. No  Adverse effects from previous infusion. Today 152 lbs =69 kg  X 10 mg/kg = 670 mg, Rounded to 720 mg per MD.  Infusion  tolerated well. Vitals stable. Next visit scheduled  in 4 weeks. IV Gauge: #24  IV Site: left antecubital  # of Attempts: 1  IV Start: 0845 am  IV Stop: 0945 am     See Therapy Plan, flowsheets and MAR  Administrations This Visit     belimumab (BENLYSTA) 688 mg in sodium chloride 0.9 % 250 mL infusion     Admin Date  03/23/2022  08:45 Action  New Bag Dose  720 mg Rate  250 mL/hr Route  IntraVENous Site   Administered By  Mikaela Monterroso RN    Ordering Provider: Tiffany Mandel MD    Patient Supplied?: No    Comments: confirmed dose with Dr Gomez Reason. 720 mg today.

## 2022-03-23 NOTE — PROGRESS NOTES
[address], [place]                                                           [address] [place] [address], [address] (P) [phone] (F)      Dear Dr. Jessica Christian MD:  Please find Rheumatology assessment. Thank you for giving me the opportunity to be involved in Misa Mcbride's care and I look forward following Misa Kasper. along with you. If you have any questions or concerns please feel free to reach me. Note is transcribed using voice recognition software. Inadvertent computerized transcription errors may be present. Patient identification: Misa Mcbride,: 1990,31 y.o. Sex: adult     A/P  Misa Irene was seen today for follow-up. Diagnoses and all orders for this visit:    Systemic lupus erythematosus, unspecified SLE type, unspecified organ involvement status ([facility] [address]  -     Urinalysis; Future    High risk medication use    Fibromyalgia  -     External Referral To Massage Therapy    Chronic bilateral low back pain without sciatica    Other orders  -     tiZANidine (ZANAFLEX) 4 MG tablet; Take 1 at night, if tolerated in AM.         Background-   SLE-diagnosis -based on arthritis, photosensitivity, photosensitive rash, patchy alopecia, possible pleuritis. Abnormal labs including positive IRIS, double-stranded DNA, SSA, depressed C3-C4. Also has noninflammatory discomfort from fibromyalgia causing generalized musculoskeletal pain. Tried several medications-most of them for short duration-Imuran, methotrexate-leukopenia, CellCept-some kind of blood abnormalities, hydroxychloroquine-abnormal eye exam per patient, has been maintained on Benlysta IV therapy for about 5 years, last infusion was in 2020.   She does not notice any change in her symptoms. She is on Cymbalta for fibromyalgia. Is on disability because of PTSD and lupus. A/PToday-  1. Myofascial pain-predominantly affecting cervical and lumbar paraspinal muscles. Followed by pain management and spine. Had epidural injections, Vicodin 5 mg twice daily by pain management. Is on Lyrica 50 mg twice daily. Reassured patient that this is not from lupus. We also talked about management of noninflammatory pain by utilizing nonpharmacological measures including regular stretching, possibly dry needling and massage therapy. Prescription was given to the patient for massage therapy. Also try Zanaflex side effect discussed mainly sedation. She also has sleep apnea, using CPAP, hopefully that will also help with generalized myofascial pain. 2.  Lupus-does not have any clinical or laboratory markers of active disease. Check labs today. Continue Benlysta at current dose. 3.  Raynaud phenomena-getting better with weather changes. May reduce amlodipine 2.5 mg a day or discontinue during summer months if remains stable. Call with any flares, follow-up in 3 months. Patient indicates understanding and agrees with the management plan. I reviewed patient's history, referral documents and electronic medical records. Outside rheumatology records are scanned and reviewed as well as information from care everywhere is reviewed including rheumatology note, labs. #######################################################################    Subjective-  Her predominant complaint remains severe paraspinal muscle stiffness in the cervical and lumbar area. She is followed by pain management/spine, had epidural injections, now on Vicodin twice, continues to take Lyrica. Unfortunately continues to have chronic noninflammatory pain. 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  Fatigue is persisting.   Sleep is poor, has been using CPAP for sleep apnea. Raynaud's phenomena is doing well with weather getting warmer. No swollen, inflamed joints. No other active symptoms of lupus. Denies any focal alopecia, pleurisy, or any other symptoms of active lupus. Laboratory markers have normalized as well. Tolerating medications well. No sedation. Denies any infections. All other ROS are negative. PMH, PSH,Social history , Meds reviewed. PHYSICAL EXAM:    Vitals: There were no vitals taken for this visit. AA)x3 well nourished, and well groomed, normal judgement. MKS: No appreciable tender, swollen or inflamed joints in upper or lower extremities, full range of motion of peripheral joints. Subjective paraspinal cervical and lumbar, trapezius muscle stiffness. Normal gait and muscle strength in upper and lower extremities. Using cane for ambulation because of back stiffness. No focal neurological deficit. Skin: No rashes, no induration or skin thickening or nodules. HEENT: Normal lids, lacrimal glands and pupils. No oral or nasal ulcers. Salivary glands reveal no evidence of abnormality. External inspection of the ears and nose within normal limits. Neck: No adenopathy or thyroid enlargement.     DATA:   Lab Results   Component Value Date    WBC 4.5 12/08/2021    HGB 13.2 12/08/2021    HCT 39.3 12/08/2021    MCV 85.1 12/08/2021     12/08/2021         Chemistry        Component Value Date/Time     12/08/2021 1008    K 3.8 12/08/2021 1008     12/08/2021 1008    CO2 26 12/08/2021 1008    BUN 13 12/08/2021 1008    CREATININE 0.8 12/08/2021 1008        Component Value Date/Time    CALCIUM 8.6 12/08/2021 1008    ALKPHOS 79 01/22/2021 0854    AST 19 12/01/2021 0855    ALT 16 12/01/2021 0855    BILITOT 0.4 01/22/2021 0854          No results found for: [facility]  Lab Results   Component Value Date    CRP <3.0 12/01/2021     Lab Results   Component Value Date    IRIS POSITIVE (A) 01/22/2021    SEDRATE 8 12/01/2021     No results found for: CKTOTAL  Normal protein creatinine ratio from 10/20/2021  Total time 33 minutes that includes the following-  Preparing to see the patient such as reviewing patients records, pre-charting, preparing the visit on the same day, performing a medically appropriate history and physical examination, counseling and educating patient about diagnosis, management plan, ordering appropriate testings, prescriptions, communicating findings to other care providers, and documenting clinical information in electronic medical record. A/P- See above.

## 2022-03-31 ENCOUNTER — OFFICE VISIT (OUTPATIENT)
Dept: NEUROLOGY | Age: 32
End: 2022-03-31
Payer: MEDICARE

## 2022-03-31 VITALS
SYSTOLIC BLOOD PRESSURE: 117 MMHG | HEIGHT: 66 IN | RESPIRATION RATE: 20 BRPM | DIASTOLIC BLOOD PRESSURE: 79 MMHG | HEART RATE: 107 BPM | WEIGHT: 152 LBS | BODY MASS INDEX: 24.43 KG/M2

## 2022-03-31 DIAGNOSIS — G47.33 OSA (OBSTRUCTIVE SLEEP APNEA): ICD-10-CM

## 2022-03-31 DIAGNOSIS — M79.7 FIBROMYALGIA: ICD-10-CM

## 2022-03-31 DIAGNOSIS — G43.119 INTRACTABLE MIGRAINE WITH AURA WITHOUT STATUS MIGRAINOSUS: Primary | ICD-10-CM

## 2022-03-31 PROCEDURE — 99214 OFFICE O/P EST MOD 30 MIN: CPT | Performed by: PSYCHIATRY & NEUROLOGY

## 2022-03-31 RX ORDER — RIMEGEPANT SULFATE 75 MG/75MG
TABLET, ORALLY DISINTEGRATING ORAL
Qty: 8 TABLET | Refills: 1 | Status: SHIPPED | OUTPATIENT
Start: 2022-03-31 | End: 2022-04-01 | Stop reason: SDUPTHER

## 2022-03-31 NOTE — PROGRESS NOTES
Marilu Agee   Neurology followup    Subjective:   CC/HP  History was obtained from the patient. Patient states that she is doing better. Patient has had increased headaches. She now averages 5-7 headaches may be up to 10 headaches a month. She has been taking the Nurtec on and off. She is on Aimovig 70 mg subcutaneously every month. No side effects to medication. Detailed initial history:  Patient states that she has had migraine headaches for a number of years. She used to live in [place] and was seen by neurology there. They have tried her on several medications including topiramate amitriptyline and Botox injections. These were not very helpful. She was then put on Emgality. This seemed to help the most.  She also got trigger point injections at times. [place]. She has been off the Emgality injections. She continues to have severe headaches. The headaches are approximately 1-3 headaches a week. They can last for several hours. Bright light and loud noise may make the headaches worse and sleeping in a quiet dark room would make it better. Patient also has scintillating scotoma. Patient also has nausea with severe headaches. No other focal neurological symptoms. Patient sister also has migraines. Patient has no systemic lupus erythematosus.   Patient states that she had seizure-like episodes in [place] but was never treated as such for epilepsy    REVIEW OF SYSTEMS    Constitutional:  []   Chills   []  Fatigue   []  Fevers   []  Malaise   []  Weight loss     [x] Denies all of the above    Respiratory:   []  Cough    []  Shortness of breath         [x] Denies all of the above     Cardiovascular:   [x]  Chest pain    []  Exertional chest pressure/discomfort           [x] Palpitations    [x]  Syncope     [] Denies all of the above        Past Medical History:   Diagnosis Date    Anxiety     Asthma     Bulimia     Depression     Environmental allergies     Fibromyalgia 3/17/2021    Fibromyalgia syndrome     LEONEL (generalized anxiety disorder) 3/17/2021    Gestational hypertension, antepartum     High risk medication use 10/5/2021    Lupus ([facility] [address]     just diagnosised with lupus last week    Other specified hypothyroidism 3/17/2021    Seizures ([facility] [address]      Family History   Problem Relation Age of Onset    Hypertension Mother     Thyroid Disease Mother     Depression Father     Sleep Apnea Father     Depression Sister     Anxiety Disorder Sister     Migraines Sister     Glaucoma Maternal Grandmother     Heart Attack Maternal Grandfather     Heart Disease Maternal Grandfather     Cancer Paternal Grandmother     Cancer Paternal Grandfather      Social History     Socioeconomic History    Marital status: Legally      Spouse name: None    Number of children: None    Years of education: None    Highest education level: None   Occupational History    None   Tobacco Use    Smoking status: Former Smoker     Packs/day: 0.20     Types: Cigarettes     Start date:      Quit date:      Years since quittin.2    Smokeless tobacco: Former User   Vaping Use    Vaping Use: Never used   Substance and Sexual Activity    Alcohol use: No    Drug use: Not Currently    Sexual activity: None   Other Topics Concern    None   Social History Narrative    None     Social Determinants of Health     Financial Resource Strain:     Difficulty of Paying Living Expenses: Not on file   Food Insecurity:     Worried About Running Out of Food in the Last Year: Not on file    Danny of Food in the Last Year: Not on file   Transportation Needs:     Lack of Transportation (Medical): Not on file    Lack of Transportation (Non-Medical):  Not on file   Physical Activity:     Days of Exercise per Week: Not on file    Minutes of Exercise per Session: Not on file   Stress:     Feeling of Stress : Not on file   Social Connections:     Frequency of Communication with Friends and Family: Not on file    Frequency of Social Gatherings with Friends and Family: Not on file    Attends [employer] Services: Not on file    Active Member of Clubs or Organizations: Not on file    Attends Club or Organization Meetings: Not on file    Marital Status: Not on file   Intimate Partner Violence:     Fear of Current or Ex-Partner: Not on file    Emotionally Abused: Not on file    Physically Abused: Not on file    Sexually Abused: Not on file   Housing Stability:     Unable to Pay for Housing in the Last Year: Not on file    Number of [place] in the Last Year: Not on file    Unstable Housing in the Last Year: Not on file        Objective:  Exam:  /79   Pulse 107   Resp 20   Ht 5' 6\" (1.676 m)   Wt 152 lb (68.9 kg)   LMP 01/06/2022   BMI 24.53 kg/m²   This is a well-nourished patient in no acute distress  Patient is awake, alert and oriented x3. Speech is normal.  Pupils are equal round reacting to light. Extraocular movements intact. Face symmetrical. Tongue midline. Motor exam shows normal symmetrical strength. Deep tendon reflexes normal. Plantar reflexes downgoing. Sensory exam normal. Coordination normal. Gait normal. No carotid bruit. No neck stiffness.       Data :  LABS:  General Labs:    CBC:   Lab Results   Component Value Date    WBC 3.2 03/23/2022    RBC 4.76 03/23/2022    HGB 14.1 03/23/2022    HCT 41.7 03/23/2022    MCV 87.7 03/23/2022    MCH 29.6 03/23/2022    MCHC 33.8 03/23/2022    RDW 12.9 03/23/2022     03/23/2022    MPV 7.9 03/23/2022     BMP:    Lab Results   Component Value Date     12/08/2021    K 3.8 12/08/2021     12/08/2021    CO2 26 12/08/2021    BUN 13 12/08/2021    LABALBU 4.1 01/22/2021    CREATININE 1.0 03/23/2022    CALCIUM 8.6 12/08/2021    GFRAA >60 03/23/2022    GFRAA >60 08/31/2011    LABGLOM >60 03/23/2022    GLUCOSE 103 12/08/2021       Impression :  Migraine headaches, not well

## 2022-04-01 DIAGNOSIS — G43.119 INTRACTABLE MIGRAINE WITH AURA WITHOUT STATUS MIGRAINOSUS: Primary | ICD-10-CM

## 2022-04-01 RX ORDER — RIMEGEPANT SULFATE 75 MG/75MG
TABLET, ORALLY DISINTEGRATING ORAL
Qty: 8 TABLET | Refills: 1 | Status: SHIPPED | OUTPATIENT
Start: 2022-04-01 | End: 2022-06-30 | Stop reason: SDUPTHER

## 2022-04-04 RX ORDER — TRAZODONE HYDROCHLORIDE 50 MG/1
TABLET ORAL
Qty: 90 TABLET | Refills: 1 | Status: SHIPPED | OUTPATIENT
Start: 2022-04-04

## 2022-04-18 ENCOUNTER — PATIENT MESSAGE (OUTPATIENT)
Dept: NEUROLOGY | Age: 32
End: 2022-04-18

## 2022-04-18 RX ORDER — TIZANIDINE 4 MG/1
TABLET ORAL
Qty: 60 TABLET | Refills: 2 | Status: SHIPPED | OUTPATIENT
Start: 2022-04-18 | End: 2022-04-20 | Stop reason: ALTCHOICE

## 2022-04-19 NOTE — TELEPHONE ENCOUNTER
From: Mirella Benitez  To: Dr. Aliza Marcial: 4/18/2022 12:16 PM EDT  Subject: Ayaan Jean-Baptiste prescription     Ive been waiting since 3-31 for my Aimovig prescription. The pharmacy say that my insurance requires approval and they reached out to you almost 3 weeks ago. Im a week past when I normally take this medication. Hope this will be resolved asap.     Mirella Benitez

## 2022-04-20 ENCOUNTER — NURSE ONLY (OUTPATIENT)
Dept: INFUSION THERAPY | Age: 32
End: 2022-04-20
Payer: MEDICARE

## 2022-04-20 VITALS
SYSTOLIC BLOOD PRESSURE: 114 MMHG | HEART RATE: 70 BPM | BODY MASS INDEX: 25.34 KG/M2 | DIASTOLIC BLOOD PRESSURE: 70 MMHG | TEMPERATURE: 98.7 F | WEIGHT: 157 LBS | RESPIRATION RATE: 16 BRPM

## 2022-04-20 DIAGNOSIS — M32.9 SYSTEMIC LUPUS ERYTHEMATOSUS, UNSPECIFIED SLE TYPE, UNSPECIFIED ORGAN INVOLVEMENT STATUS (HCC): Primary | ICD-10-CM

## 2022-04-20 LAB
BACTERIA: ABNORMAL /HPF
BILIRUBIN URINE: NEGATIVE
BLOOD, URINE: ABNORMAL
CLARITY: ABNORMAL
COLOR: ABNORMAL
EPITHELIAL CELLS, UA: ABNORMAL /HPF (ref 0–5)
GLUCOSE URINE: NEGATIVE MG/DL
KETONES, URINE: NEGATIVE MG/DL
LEUKOCYTE ESTERASE, URINE: ABNORMAL
MICROSCOPIC EXAMINATION: YES
NITRITE, URINE: NEGATIVE
PH UA: 5.5 (ref 5–8)
PROTEIN UA: NEGATIVE MG/DL
RBC UA: ABNORMAL /HPF (ref 0–4)
SPECIFIC GRAVITY UA: >=1.03 (ref 1–1.03)
URINE TYPE: ABNORMAL
UROBILINOGEN, URINE: 0.2 E.U./DL
WBC UA: ABNORMAL /HPF (ref 0–5)

## 2022-04-20 PROCEDURE — 81003 URINALYSIS AUTO W/O SCOPE: CPT | Performed by: INTERNAL MEDICINE

## 2022-04-20 PROCEDURE — 96413 CHEMO IV INFUSION 1 HR: CPT | Performed by: INTERNAL MEDICINE

## 2022-04-20 NOTE — PROGRESS NOTES
4/20/2022  Pt here for Benlysta infusion #13. No  Adverse effects from previous infusion. Today 157 lbs =71 kg   X 10 mg/kg = 710 mg, Rounded to 720 mg per MD.  Infusion  tolerated well. Vitals stable. Next visit scheduled  in 4 weeks.     IV Gauge: #24  IV Site: right antecubital  # of Attempts: 1  Infusion Start time: 0850 am  Infusion Stop time: 0950 am        See Therapy Plan, flowsheets and MAR    Administrations This Visit     belimumab (BENLYSTA) 712 mg in sodium chloride 0.9 % 250 mL infusion     Admin Date  04/20/2022  08:50 Action  New Bag Dose  720 mg Rate  250 mL/hr Route  IntraVENous Site   Administered By  Luiza Briggs RN    Ordering Provider: Francisca Tillman MD    Patient Supplied?: No    Comments: 720 mg per Jordy

## 2022-04-20 NOTE — PATIENT INSTRUCTIONS
belimumab  Pronunciation: be CASTRO ue mab  Brand: Benlysta  What is the most important information I should know about belimumab? Some people have had serious or fatal allergic reactions to this medicine within hours or days after an injection. Tell your doctor right away if you have symptoms such as headache, anxiety, rash, itching, swelling in your face or throat, nausea, trouble breathing, andfeeling dizzy or light-headed. Belimumab affects your immune system. You may get infections more easily, even serious or fatal infections. Call your doctor if you have a fever, chills, cough with mucus, skin sores, warmth or redness under your skin, increased urination, or burning when youurinate. Report any new or worsening mental health symptoms to your doctor, such as: depression, mood or behavior changes, trouble sleeping, or thoughtsabout hurting yourself or others. What is belimumab? Belimumab is used together with other medicines to treat active systemic lupuserythematosus (SLE) in adults and children at least 11years old. Beliumumab is also used to treat kidney problems (active lupus nephritis) inadults with SLE who are using other lupus medication. Belimumab is not for use in people who have active SLE that affects the centralnervous system (brain, nerves, and spinal cord). Belimumab may also be used for other purposes not listed in this medicationguide. What should I discuss with my healthcare provider before using belimumab? You should not use belimumab if you are allergic to it. Tell your doctor if you have ever had:   an active or chronic infection;   depression or mental illness;   suicidal thoughts or actions;   cancer;   a drug allergy;   if you recently received a vaccine; or   if you are using cyclophosphamide, biologic medicines, or other monoclonal antibody medicines. Belimumab may increase your risk of certain cancers by changing the way yourimmune system works.  Ask your doctor about your individual risk. Some people have thoughts about suicide while using belimumab. Your doctor will need to check your progress at regular visits. Your family or other caregiversshould also be alert to changes in your mood or symptoms. Belimumab may affect the immune system of your baby if you use this medicine while you are pregnant. Use effective birth control to prevent pregnancy while you are using belimumab and for at least 4 months after your last dose. Tell your doctor if you becomepregnant. Belimumab may affect your baby's immune system, but having SLE during pregnancy may cause complications such as worsened lupus, eclampsia (dangerously high blood pressure), premature birth, miscarriage, or growth problems in the unborn baby. SLE in the mother may also cause lupus or heart problems to develop inthe . The benefit of treating SLE may outweigh any risks to the baby. If you are pregnant, your name may be listed on a pregnancy registry to track the effects of belimumab on the baby. Make sure any doctor caring for your  baby knows if you used belimumab while you were pregnant. It may not be safe to breastfeed while using this medicine. Ask your doctorabout any risk. How is belimumab given? Follow all directions on your prescription label and read all medication guides or instruction sheets. Your doctor may occasionally change your dose. Use themedicine exactly as directed. Belimumab is given as an infusion into a vein, usually every 2 to 4 weeks. A healthcare provider will give you this injection. The medicine must be given slowly, and the infusion can take about 1hour to complete. In adults, belimumab may also be injected under the skin, usually once weekly on the same day each week. A healthcare provider may teach you how to properly use the medication by yourself. Do not inject thismedicine into skin that is bruised, tender, red, or hard.   If you give injections at home, read and carefully follow any Instructions for Use provided with your medicine. Ask your doctor or pharmacist if you don't understand all instructions. Prepare an injection only when you are ready to give it. Do not use if the medicine looks cloudy, has changed colors, or has particles in it. Call your pharmacist for new medicine. You may be given other medications to help prevent serious side effects or an allergic reaction. Keep using these medicines for as long as your doctor hasprescribed. Store the prefilled syringe  or injection pen  in its original packaging in the refrigerator. Do not freeze or expose tolight or high heat. Do not shake the medicine. Take the syringe or injection pen out of the refrigerator and let it reach room temperature for 30 minutes before injecting your dose. Do not use if the medicine has been left at room temperature longer than 12 hours. Do not put it back into the refrigerator. Call your pharmacist for new medicine. Each prefilled syringe or injection pen is for one use only. Throw it awayafter one use, even if there is still medicine left inside. Throw away used needles, syringes, or injection pens in a puncture-proof \"sharps\" container. Follow state or local laws about how to dispose of thiscontainer. Keep it out of the reach of children and pets. What happens if I miss a dose? Use the medicine as soon as you remember. You can either restart a weekly schedule based on the new injection day, or you can go back to your regular injection schedule. Do not use 2 injections on the same day. Call your doctor for instructions if you miss an appointment for an intravenousinfusion of belimumab. What happens if I overdose? Seek emergency medical attention or call the Poison Help line at 1-303.565.6847. What should I avoid while using belimumab? Avoid being near people who are sick or have infections. Tell your doctor atonce if you develop signs of infection.   Do not receive a \"live\" vaccine while using belimumab. The vaccine may not work as well and may not fully protect you from disease. Live vaccines include measles, mumps, rubella (MMR), rotavirus, typhoid, yellow fever, varicella (chickenpox), zoster (shingles), and nasal flu (influenza)vaccine. What are the possible side effects of belimumab? Get emergency medical help if you have signs of an allergic reaction:  hives, itching; feeling anxious or light-headed; difficult breathing; swellingof your face, lips, tongue, or throat. Some people have had serious or fatal allergic reactions to this medicine within hours or days after an injection. Tell your doctor right away if you have symptoms such as muscle pain, headache, tiredness, slow heartbeats, rash, itching, swelling in your face orthroat, anxiety, nausea, trouble breathing, and feeling dizzy or light-headed. You may get infections more easily, even serious or fatal infections. Stop using belimumab and call your doctor right away if you have signs ofinfection such as:   fever, chills;   skin sores, warmth, or redness;   cough with mucus, chest pain, shortness of breath;   pain or burning when you urinate;   urinating more than usual; or   bloody diarrhea. Belimumab may cause a serious brain infection that can lead to disability or death. Call your doctor right away if you have problems with speech, thought, vision, or muscle movement. These symptoms may start gradually and get worsequickly. Also call your doctor at once if you have new or worsening depression, anxiety, mood or behavior changes, trouble sleeping, risk-taking behavior, or thoughtsabout hurting yourself or others.   Common side effects may include:   nausea, diarrhea;   fever, sore throat, runny or stuffy nose, cough, chest tightness;   pain, itching, redness, or swelling where an injection was given under the skin;   pain in your arms or legs;   headache, depressed mood; or   sleep problems (insomnia). This is not a complete list of side effects and others may occur. Call your doctor for medical advice about side effects. You may report side effects toFDA at 6-219-BJN-[phone]. What other drugs will affect belimumab? Other drugs may affect belimumab, including prescription and over-the-counter medicines, vitamins, and herbal products. Tell your doctor about all yourcurrent medicines and any medicine you start or stop using. Where can I get more information? Your doctor or pharmacist can provide more information about belimumab. Remember, keep this and all other medicines out of the reach of children, never share your medicines with others, and use this medication only for the indication prescribed. Every effort has been made to ensure that the information provided by [address] is accurate, up-to-date, and complete, but no guarantee is made to that effect. Drug information contained herein may be time sensitive. [facility] information has been compiled for use by healthcare practitioners and consumers in the [place] and therefore [facility] does not warrant that uses outside of the [place] are appropriate, unless specifically indicated otherwise. [facility]'s drug information does not endorse drugs, diagnose patients or recommend therapy. [facility] drug information is an informational resource designed to assist licensed healthcare practitioners in caring for their patients and/or to serve consumers viewing this service as a supplement to, and not a substitute for, the expertise, skill, knowledge and judgment of healthcare practitioners. The absence of a warning for a given drug or drug combination in no way should be construed to indicate that the drug or drug combination is safe, effective or appropriate for any given patient. [facility] does not assume any responsibility for any aspect of healthcare administered with the aid of information [facility] provides.  The information contained herein is not intended to cover all possible uses, directions, precautions, warnings, drug interactions, allergic reactions, or adverse effects. If you have questions about the drugs you are taking, check with yourdoctor, nurse or pharmacist.  Copyright 0382-7616 [employer] [address] [place] [address]. Version: 9.01. Revision date: 4/15/2021. Care instructions adapted under license by [facility] ([facility]). If you have questions about a medical condition or this instruction, always ask your healthcare professional. [place] [address] any warranty or liability for your use of this information. To avoid any disruptions in your treatment, we ask that you please notify us immediately if you have ANY insurance changes such as a new plan, new plan number, new group number, or if you might be losing coverage. If the infusion nurse does not receive your new insurance information, your infusion may be temporarily held until we can check your benefits and get an authorization from your new insurance. Please notify us BEFORE your next infusion, as it may take several weeks to check your benefits and for your new insurance to approve your continuing treatment. Please know that we want to help you in anyway we can, so notify us early! If your insurance coverage changes in ANY way you must do the follow:   Contact our office and speak with the Infusion Nurse. All calls will be returned within 48 hours, except on weekends. We will need a copy of both the front and back of your new card.  Infusion appointments will be placed on HOLD until a new Verification of Benefits and/or Prior Authorization (if required) is acquired   a. A NEW VOB (verification of benefits) must be requested to verify coverage for the medication and infusion fees. b. If a Prior Authorization is needed, we will obtain this and provide information needed to your insurance. c. This process can take 3-4 weeks depending on insurance.    Once the new VOB has been obtained the Infusion RN or a member of the Rheumatology staff will contact you to discuss changes in coverage. A new infusion schedule can be made at this time.  Changes in insurance may disqualify you from receiving infusion coverage, however, we will do everything we can to get your treatment, as we know it is necessary for your disease and quality of living.       *Co-payment and deductible is due at time of Service. *As a reminder, anyone on Patient Copay Assistance (Commercial insurance ONLY. This does NOT pertain to Medicare or Medicaid). You should have received information from the drug  regarding how the copay programs work. If you have not received the information, please let us know. You are responsible for sending in the Explanation of Benefits (EOBs) and [employer] for dates of service to your individual Savings Program and then following thru to ensure it was approved. If you are having difficulty understanding the program, please reach out immediately for assistance. If these are not sent in a timely manner, you will be responsible for the large portion that your insurance doesn't cover, which can be in the thousands! These charges can add up quickly and we don't want you to be stuck with large bills or your treatment be stopped for non-payment of the medication. *Reminder Appointments are schedule one appointment ahead if you come every 6-8 weeks, and 2 ahead if you come every 4 weeks. Please know that we cannot infuse if the physician is not present, so if we cannot accommodate your needs, please feel free to ask what other options are available. The nurse will do the best to accommodate your needs , there may be times that this is not always possible. Please be patient as we try to accommodate patients in a safe, and timely manner.  If the physician is out, be aware that you may need to go to our [facility], located on campus of [facility], to have your treatment in a timely manner. If you have questions, please speak with a member of the Rheumatology staff. We are here to help and guide you through this infusion process and ensure that you get the treatment that you need. Thank you for letting us take care of you! Thank you for your cooperation and help! We are grateful to serve you!

## 2022-04-22 RX ORDER — SODIUM CHLORIDE 0.9 % (FLUSH) 0.9 %
10 SYRINGE (ML) INJECTION PRN
Status: CANCELLED | OUTPATIENT
Start: 2022-04-22

## 2022-04-22 RX ORDER — HEPARIN SODIUM (PORCINE) LOCK FLUSH IV SOLN 100 UNIT/ML 100 UNIT/ML
500 SOLUTION INTRAVENOUS PRN
Status: CANCELLED | OUTPATIENT
Start: 2022-04-22

## 2022-04-22 RX ORDER — SODIUM CHLORIDE 9 MG/ML
INJECTION, SOLUTION INTRAVENOUS CONTINUOUS
Status: CANCELLED | OUTPATIENT
Start: 2022-04-22

## 2022-04-22 RX ORDER — DIPHENHYDRAMINE HYDROCHLORIDE 50 MG/ML
50 INJECTION INTRAMUSCULAR; INTRAVENOUS ONCE
Status: CANCELLED | OUTPATIENT
Start: 2022-04-22 | End: 2022-04-22

## 2022-04-22 RX ORDER — EPINEPHRINE 1 MG/ML
0.3 INJECTION, SOLUTION, CONCENTRATE INTRAVENOUS PRN
Status: CANCELLED | OUTPATIENT
Start: 2022-04-22

## 2022-04-22 RX ORDER — ACETAMINOPHEN 325 MG/1
650 TABLET ORAL ONCE
Status: CANCELLED | OUTPATIENT
Start: 2022-04-22

## 2022-04-22 RX ORDER — SODIUM CHLORIDE 0.9 % (FLUSH) 0.9 %
5 SYRINGE (ML) INJECTION PRN
Status: CANCELLED | OUTPATIENT
Start: 2022-04-22

## 2022-04-22 RX ORDER — METHYLPREDNISOLONE SODIUM SUCCINATE 125 MG/2ML
40 INJECTION, POWDER, LYOPHILIZED, FOR SOLUTION INTRAMUSCULAR; INTRAVENOUS ONCE
Status: CANCELLED | OUTPATIENT
Start: 2022-04-22 | End: 2022-04-22

## 2022-04-22 RX ORDER — FAMOTIDINE 10 MG/ML
20 INJECTION, SOLUTION INTRAVENOUS ONCE
Status: CANCELLED | OUTPATIENT
Start: 2022-04-22 | End: 2022-04-22

## 2022-04-29 RX ORDER — LEVOTHYROXINE SODIUM 0.05 MG/1
TABLET ORAL
Qty: 90 TABLET | Refills: 0 | OUTPATIENT
Start: 2022-04-29

## 2022-04-29 NOTE — TELEPHONE ENCOUNTER
Medication:   Requested Prescriptions     Pending Prescriptions Disp Refills    levothyroxine (SYNTHROID) 50 MCG tablet [Pharmacy Med Name: LEVOTHYROXINE 50 MCG TABLET] 90 tablet 0     Sig: TAKE 1 TABLET BY MOUTH EVERY DAY       Last Filled:      Patient Phone Number: [phone] (home)     Last appt: 3/9/2021   Next appt: Visit date not found    Last Thyroid:   Lab Results   Component Value Date    FT3 2.1 10/06/2021    T4FREE 1.0 10/06/2021

## 2022-05-08 DIAGNOSIS — M79.7 FIBROMYALGIA: ICD-10-CM

## 2022-05-09 RX ORDER — PREGABALIN 50 MG/1
CAPSULE ORAL
Qty: 180 CAPSULE | Refills: 1 | Status: SHIPPED | OUTPATIENT
Start: 2022-05-09 | End: 2022-06-15

## 2022-05-18 ENCOUNTER — NURSE ONLY (OUTPATIENT)
Dept: INFUSION THERAPY | Age: 32
End: 2022-05-18
Payer: MEDICARE

## 2022-05-18 VITALS
WEIGHT: 157.8 LBS | DIASTOLIC BLOOD PRESSURE: 68 MMHG | HEART RATE: 70 BPM | RESPIRATION RATE: 16 BRPM | TEMPERATURE: 98.8 F | SYSTOLIC BLOOD PRESSURE: 110 MMHG | BODY MASS INDEX: 25.47 KG/M2

## 2022-05-18 DIAGNOSIS — M32.9 SYSTEMIC LUPUS ERYTHEMATOSUS, UNSPECIFIED SLE TYPE, UNSPECIFIED ORGAN INVOLVEMENT STATUS (HCC): Primary | ICD-10-CM

## 2022-05-18 PROCEDURE — 96413 CHEMO IV INFUSION 1 HR: CPT | Performed by: INTERNAL MEDICINE

## 2022-05-18 RX ORDER — SODIUM CHLORIDE 0.9 % (FLUSH) 0.9 %
5 SYRINGE (ML) INJECTION PRN
Status: CANCELLED | OUTPATIENT
Start: 2022-06-15

## 2022-05-18 RX ORDER — FAMOTIDINE 10 MG/ML
20 INJECTION, SOLUTION INTRAVENOUS ONCE
Status: CANCELLED | OUTPATIENT
Start: 2022-06-15 | End: 2022-06-15

## 2022-05-18 RX ORDER — DIPHENHYDRAMINE HYDROCHLORIDE 50 MG/ML
50 INJECTION INTRAMUSCULAR; INTRAVENOUS ONCE
Status: CANCELLED | OUTPATIENT
Start: 2022-06-15 | End: 2022-06-15

## 2022-05-18 RX ORDER — SODIUM CHLORIDE 9 MG/ML
INJECTION, SOLUTION INTRAVENOUS CONTINUOUS
Status: CANCELLED | OUTPATIENT
Start: 2022-06-15

## 2022-05-18 RX ORDER — AMLODIPINE BESYLATE 2.5 MG/1
2.5 TABLET ORAL DAILY
COMMUNITY
End: 2022-06-06

## 2022-05-18 RX ORDER — METHYLPREDNISOLONE SODIUM SUCCINATE 125 MG/2ML
40 INJECTION, POWDER, LYOPHILIZED, FOR SOLUTION INTRAMUSCULAR; INTRAVENOUS ONCE
Status: CANCELLED | OUTPATIENT
Start: 2022-06-15 | End: 2022-06-15

## 2022-05-18 RX ORDER — EPINEPHRINE 1 MG/ML
0.3 INJECTION, SOLUTION, CONCENTRATE INTRAVENOUS PRN
Status: CANCELLED | OUTPATIENT
Start: 2022-06-15

## 2022-05-18 RX ORDER — ACETAMINOPHEN 325 MG/1
650 TABLET ORAL ONCE
Status: CANCELLED | OUTPATIENT
Start: 2022-06-15

## 2022-05-18 RX ORDER — RISPERIDONE 0.25 MG/1
0.25 TABLET, FILM COATED ORAL 2 TIMES DAILY
COMMUNITY

## 2022-05-18 RX ORDER — SODIUM CHLORIDE 0.9 % (FLUSH) 0.9 %
10 SYRINGE (ML) INJECTION PRN
Status: CANCELLED | OUTPATIENT
Start: 2022-06-15

## 2022-05-18 RX ORDER — HEPARIN SODIUM (PORCINE) LOCK FLUSH IV SOLN 100 UNIT/ML 100 UNIT/ML
500 SOLUTION INTRAVENOUS PRN
Status: CANCELLED | OUTPATIENT
Start: 2022-06-15

## 2022-05-18 NOTE — PROGRESS NOTES
5/18/2022  Pt here for Benlysta infusion #14. No  Adverse effects from previous infusion. Today 157 lbs =71 kg   X 10 mg/kg = 710 mg, Rounded to 720 mg per MD.  Infusion  tolerated well. Vitals stable. Next visit scheduled  in 4 weeks.     IV Gauge: #24  IV Site: left antecubital  # of Attempts: 1  Infusion Start time: 0840 am   Infusion Stop time: 0945 am          See Therapy Plan, flowsheets and MAR  Administrations This Visit     belimumab (BENLYSTA) 720 mg in sodium chloride 0.9 % 250 mL infusion     Admin Date  05/18/2022  08:40 Action  New Bag Dose  720 mg Rate  250 mL/hr Route  IntraVENous Site   Administered By  Chris Stein RN    Ordering Provider: Ladean Gitelman, MD    Patient Supplied?: No

## 2022-05-18 NOTE — PATIENT INSTRUCTIONS
Patient Education        belimumab  Pronunciation: be CASTRO ue mab  Brand: Benlysta  What is the most important information I should know about belimumab? Some people have had serious or fatal allergic reactions to this medicine within hours or days after an injection. Tell your doctor right away if you have symptoms such as headache, anxiety, rash, itching, swelling in your face or throat, nausea, trouble breathing, andfeeling dizzy or light-headed. Belimumab affects your immune system. You may get infections more easily, even serious or fatal infections. Call your doctor if you have a fever, chills, cough with mucus, skin sores, warmth or redness under your skin, increased urination, or burning when youurinate. Report any new or worsening mental health symptoms to your doctor, such as: depression, mood or behavior changes, trouble sleeping, or thoughtsabout hurting yourself or others. What is belimumab? Belimumab is used together with other medicines to treat active systemic lupuserythematosus (SLE) in adults and children at least 11years old. Beliumumab is also used to treat kidney problems (active lupus nephritis) inadults with SLE who are using other lupus medication. Belimumab is not for use in people who have active SLE that affects the centralnervous system (brain, nerves, and spinal cord). Belimumab may also be used for other purposes not listed in this medicationguide. What should I discuss with my healthcare provider before using belimumab? You should not use belimumab if you are allergic to it. Tell your doctor if you have ever had:   an active or chronic infection;   depression or mental illness;   suicidal thoughts or actions;   cancer;   a drug allergy;   if you recently received a vaccine; or   if you are using cyclophosphamide, biologic medicines, or other monoclonal antibody medicines. Belimumab may increase your risk of certain cancers by changing the way yourimmune system works. Ask your doctor about your individual risk. Some people have thoughts about suicide while using belimumab. Your doctor will need to check your progress at regular visits. Your family or other caregiversshould also be alert to changes in your mood or symptoms. Belimumab may affect the immune system of your baby if you use this medicine while you are pregnant. Use effective birth control to prevent pregnancy while you are using belimumab and for at least 4 months after your last dose. Tell your doctor if you becomepregnant. Belimumab may affect your baby's immune system, but having SLE during pregnancy may cause complications such as worsened lupus, eclampsia (dangerously high blood pressure), premature birth, miscarriage, or growth problems in the unborn baby. SLE in the mother may also cause lupus or heart problems to develop inthe . The benefit of treating SLE may outweigh any risks to the baby. If you are pregnant, your name may be listed on a pregnancy registry to track the effects of belimumab on the baby. Make sure any doctor caring for your  baby knows if you used belimumab while you were pregnant. It may not be safe to breastfeed while using this medicine. Ask your doctorabout any risk. How is belimumab given? Follow all directions on your prescription label and read all medication guides or instruction sheets. Your doctor may occasionally change your dose. Use themedicine exactly as directed. Belimumab is given as an infusion into a vein, usually every 2 to 4 weeks. A healthcare provider will give you this injection. The medicine must be given slowly, and the infusion can take about 1hour to complete. In adults, belimumab may also be injected under the skin, usually once weekly on the same day each week. A healthcare provider may teach you how to properly use the medication by yourself. Do not inject thismedicine into skin that is bruised, tender, red, or hard.   If you give injections at home, read and carefully follow any Instructions for Use provided with your medicine. Ask your doctor or pharmacist if you don't understand all instructions. Prepare an injection only when you are ready to give it. Do not use if the medicine looks cloudy, has changed colors, or has particles in it. Call your pharmacist for new medicine. You may be given other medications to help prevent serious side effects or an allergic reaction. Keep using these medicines for as long as your doctor hasprescribed. Store the prefilled syringe  or injection pen  in its original packaging in the refrigerator. Do not freeze or expose tolight or high heat. Do not shake the medicine. Take the syringe or injection pen out of the refrigerator and let it reach room temperature for 30 minutes before injecting your dose. Do not use if the medicine has been left at room temperature longer than 12 hours. Do not put it back into the refrigerator. Call your pharmacist for new medicine. Each prefilled syringe or injection pen is for one use only. Throw it awayafter one use, even if there is still medicine left inside. Throw away used needles, syringes, or injection pens in a puncture-proof \"sharps\" container. Follow state or local laws about how to dispose of thiscontainer. Keep it out of the reach of children and pets. What happens if I miss a dose? Use the medicine as soon as you remember. You can either restart a weekly schedule based on the new injection day, or you can go back to your regular injection schedule. Do not use 2 injections on the same day. Call your doctor for instructions if you miss an appointment for an intravenousinfusion of belimumab. What happens if I overdose? Seek emergency medical attention or call the Poison Help line at 1-534.153.9214. What should I avoid while using belimumab? Avoid being near people who are sick or have infections. Tell your doctor atonce if you develop signs of infection.   Do not problems (insomnia). This is not a complete list of side effects and others may occur. Call your doctor for medical advice about side effects. You may report side effects toFDA at 7-358-MZN-2936. What other drugs will affect belimumab? Other drugs may affect belimumab, including prescription and over-the-counter medicines, vitamins, and herbal products. Tell your doctor about all yourcurrent medicines and any medicine you start or stop using. Where can I get more information? Your doctor or pharmacist can provide more information about belimumab. Remember, keep this and all other medicines out of the reach of children, never share your medicines with others, and use this medication only for the indication prescribed. Every effort has been made to ensure that the information provided by [address] is accurate, up-to-date, and complete, but no guarantee is made to that effect. Drug information contained herein may be time sensitive. [facility] information has been compiled for use by healthcare practitioners and consumers in the [place] and therefore [employer] does not warrant that uses outside of the [place] are appropriate, unless specifically indicated otherwise. [facility]'s drug information does not endorse drugs, diagnose patients or recommend therapy. [facility] drug information is an informational resource designed to assist licensed healthcare practitioners in caring for their patients and/or to serve consumers viewing this service as a supplement to, and not a substitute for, the expertise, skill, knowledge and judgment of healthcare practitioners. The absence of a warning for a given drug or drug combination in no way should be construed to indicate that the drug or drug combination is safe, effective or appropriate for any given patient. [facility] does not assume any responsibility for any aspect of healthcare administered with the aid of information [facility] provides.  The information contained herein is not intended to cover all possible uses, directions, precautions, warnings, drug interactions, allergic reactions, or adverse effects. If you have questions about the drugs you are taking, check with yourdoctor, nurse or pharmacist.  Copyright 8353-4898 [address]. Version: 9.01. Revision date: 4/15/2021. Care instructions adapted under license by [facility] ([facility]). If you have questions about a medical condition or this instruction, always ask your healthcare professional. [place] [address] any warranty or liability for your use of this information.

## 2022-05-24 DIAGNOSIS — G43.019 MIGRAINE WITHOUT AURA, INTRACTABLE, WITHOUT STATUS MIGRAINOSUS: ICD-10-CM

## 2022-05-24 RX ORDER — ERENUMAB-AOOE 140 MG/ML
INJECTION, SOLUTION SUBCUTANEOUS
Qty: 1 PEN | Refills: 0 | Status: SHIPPED | OUTPATIENT
Start: 2022-05-24 | End: 2022-06-13

## 2022-06-06 RX ORDER — AMLODIPINE BESYLATE 2.5 MG/1
TABLET ORAL
Qty: 90 TABLET | Refills: 1 | Status: SHIPPED | OUTPATIENT
Start: 2022-06-06 | End: 2022-06-30

## 2022-06-13 DIAGNOSIS — G43.019 MIGRAINE WITHOUT AURA, INTRACTABLE, WITHOUT STATUS MIGRAINOSUS: ICD-10-CM

## 2022-06-13 RX ORDER — ERENUMAB-AOOE 140 MG/ML
INJECTION, SOLUTION SUBCUTANEOUS
Qty: 140 ML | Refills: 0 | Status: SHIPPED | OUTPATIENT
Start: 2022-06-13 | End: 2022-06-30 | Stop reason: SDUPTHER

## 2022-06-15 ENCOUNTER — OFFICE VISIT (OUTPATIENT)
Dept: RHEUMATOLOGY | Age: 32
End: 2022-06-15
Payer: MEDICARE

## 2022-06-15 ENCOUNTER — NURSE ONLY (OUTPATIENT)
Dept: INFUSION THERAPY | Age: 32
End: 2022-06-15
Payer: MEDICARE

## 2022-06-15 VITALS
TEMPERATURE: 98.1 F | SYSTOLIC BLOOD PRESSURE: 112 MMHG | HEART RATE: 81 BPM | WEIGHT: 159.6 LBS | BODY MASS INDEX: 25.76 KG/M2 | DIASTOLIC BLOOD PRESSURE: 70 MMHG

## 2022-06-15 DIAGNOSIS — M32.9 SYSTEMIC LUPUS ERYTHEMATOSUS, UNSPECIFIED SLE TYPE, UNSPECIFIED ORGAN INVOLVEMENT STATUS (HCC): Primary | ICD-10-CM

## 2022-06-15 DIAGNOSIS — Z79.899 HIGH RISK MEDICATION USE: ICD-10-CM

## 2022-06-15 DIAGNOSIS — M79.7 FIBROMYALGIA: ICD-10-CM

## 2022-06-15 LAB
ALT SERPL-CCNC: 15 U/L (ref 10–40)
AST SERPL-CCNC: 18 U/L (ref 15–37)
BASOPHILS ABSOLUTE: 0 K/UL (ref 0–0.2)
BASOPHILS RELATIVE PERCENT: 0.5 %
C-REACTIVE PROTEIN: <3 MG/L (ref 0–5.1)
CREAT SERPL-MCNC: 0.7 MG/DL (ref 0.6–1.1)
EOSINOPHILS ABSOLUTE: 0.1 K/UL (ref 0–0.6)
EOSINOPHILS RELATIVE PERCENT: 4.3 %
GFR AFRICAN AMERICAN: >60
GFR NON-AFRICAN AMERICAN: >60
HCT VFR BLD CALC: 38.6 % (ref 36–48)
HEMOGLOBIN: 13.2 G/DL (ref 12–16)
LYMPHOCYTES ABSOLUTE: 1 K/UL (ref 1–5.1)
LYMPHOCYTES RELATIVE PERCENT: 36.4 %
MCH RBC QN AUTO: 30.2 PG (ref 26–34)
MCHC RBC AUTO-ENTMCNC: 34.2 G/DL (ref 31–36)
MCV RBC AUTO: 88.2 FL (ref 80–100)
MONOCYTES ABSOLUTE: 0.3 K/UL (ref 0–1.3)
MONOCYTES RELATIVE PERCENT: 12.3 %
NEUTROPHILS ABSOLUTE: 1.3 K/UL (ref 1.7–7.7)
NEUTROPHILS RELATIVE PERCENT: 46.5 %
PDW BLD-RTO: 13.3 % (ref 12.4–15.4)
PLATELET # BLD: 200 K/UL (ref 135–450)
PMV BLD AUTO: 8.6 FL (ref 5–10.5)
RBC # BLD: 4.37 M/UL (ref 4–5.2)
SEDIMENTATION RATE, ERYTHROCYTE: 13 MM/HR (ref 0–20)
WBC # BLD: 2.8 K/UL (ref 4–11)

## 2022-06-15 PROCEDURE — 96413 CHEMO IV INFUSION 1 HR: CPT | Performed by: INTERNAL MEDICINE

## 2022-06-15 PROCEDURE — 36415 COLL VENOUS BLD VENIPUNCTURE: CPT | Performed by: INTERNAL MEDICINE

## 2022-06-15 PROCEDURE — 99214 OFFICE O/P EST MOD 30 MIN: CPT | Performed by: INTERNAL MEDICINE

## 2022-06-15 RX ORDER — PREGABALIN 75 MG/1
75 CAPSULE ORAL 2 TIMES DAILY
Qty: 60 CAPSULE | Refills: 2 | Status: SHIPPED | OUTPATIENT
Start: 2022-06-15 | End: 2022-08-18

## 2022-06-15 RX ORDER — EPINEPHRINE 1 MG/ML
0.3 INJECTION, SOLUTION, CONCENTRATE INTRAVENOUS PRN
Status: CANCELLED | OUTPATIENT
Start: 2022-07-13

## 2022-06-15 RX ORDER — SODIUM CHLORIDE 0.9 % (FLUSH) 0.9 %
10 SYRINGE (ML) INJECTION PRN
Status: CANCELLED | OUTPATIENT
Start: 2022-07-13

## 2022-06-15 RX ORDER — HEPARIN SODIUM (PORCINE) LOCK FLUSH IV SOLN 100 UNIT/ML 100 UNIT/ML
500 SOLUTION INTRAVENOUS PRN
Status: CANCELLED | OUTPATIENT
Start: 2022-07-13

## 2022-06-15 RX ORDER — SODIUM CHLORIDE 9 MG/ML
INJECTION, SOLUTION INTRAVENOUS CONTINUOUS
Status: CANCELLED | OUTPATIENT
Start: 2022-07-13

## 2022-06-15 RX ORDER — METHYLPREDNISOLONE SODIUM SUCCINATE 125 MG/2ML
40 INJECTION, POWDER, LYOPHILIZED, FOR SOLUTION INTRAMUSCULAR; INTRAVENOUS ONCE
Status: CANCELLED | OUTPATIENT
Start: 2022-07-13 | End: 2022-07-13

## 2022-06-15 RX ORDER — FAMOTIDINE 10 MG/ML
20 INJECTION, SOLUTION INTRAVENOUS ONCE
Status: CANCELLED | OUTPATIENT
Start: 2022-07-13 | End: 2022-07-13

## 2022-06-15 RX ORDER — SODIUM CHLORIDE 9 MG/ML
INJECTION, SOLUTION INTRAVENOUS CONTINUOUS
Status: DISCONTINUED | OUTPATIENT
Start: 2022-06-15 | End: 2022-06-15 | Stop reason: HOSPADM

## 2022-06-15 RX ORDER — SODIUM CHLORIDE 0.9 % (FLUSH) 0.9 %
5 SYRINGE (ML) INJECTION PRN
Status: CANCELLED | OUTPATIENT
Start: 2022-07-13

## 2022-06-15 RX ORDER — ACETAMINOPHEN 325 MG/1
650 TABLET ORAL ONCE
Status: CANCELLED | OUTPATIENT
Start: 2022-07-13

## 2022-06-15 RX ORDER — DIPHENHYDRAMINE HYDROCHLORIDE 50 MG/ML
50 INJECTION INTRAMUSCULAR; INTRAVENOUS ONCE
Status: CANCELLED | OUTPATIENT
Start: 2022-07-13 | End: 2022-07-13

## 2022-06-15 NOTE — PROGRESS NOTES
Patient arrived on time for her Benlysta infusion today. She is alert and oriented and denies having an infection, illness or recent surgery. She did state over a week ago she was treated for a sinus infection. Weight =159.6lb = 72.4kg. 10mg/kg = 724mg. Benlysta was rounded to 720mg per Dr Lisset Parks orders. Labs were drawn today and the patient saw Dr Maribel Valiente today.   Patient tolerated her infusion well and she ambulated out of the office unaccompanied to her PV    IV Gauge: #22  IV Site: LAC  # of Attempts: 1   Infusion Start: 3372  Infusion Stop: 0264  IV Volume:250ml

## 2022-06-15 NOTE — PROGRESS NOTES
[address], [place]                                                           [address] [place] [address], [place] [address] (x) [phone] (A)      Dear Dr. Goldie Ramírez MD:  Please find Rheumatology assessment. Thank you for giving me the opportunity to be involved in Erich Mcbride's care and I look forward following Erich Ricks along with you. If you have any questions or concerns please feel free to reach me. Note is transcribed using voice recognition software. Inadvertent computerized transcription errors may be present. Patient identification: Erich Mcbride,: 1990,32 y.o. Sex: adult     A/P  [place] was seen today for follow-up. Diagnoses and all orders for this visit:    Systemic lupus erythematosus, unspecified SLE type, unspecified organ involvement status ([facility] [address]    High risk medication use    Fibromyalgia  -     pregabalin (LYRICA) 75 MG capsule; Take 1 capsule by mouth 2 times daily for 30 days. Background-   SLE-diagnosis -based on arthritis, photosensitivity, photosensitive rash, patchy alopecia, possible pleuritis. Abnormal labs including positive IRIS, double-stranded DNA, SSA, depressed C3-C4. Also has noninflammatory discomfort from fibromyalgia causing generalized musculoskeletal pain. Tried several medications-most of them for short duration-Imuran, methotrexate-leukopenia, CellCept-some kind of blood abnormalities, hydroxychloroquine-abnormal eye exam per patient, has been maintained on Benlysta IV therapy for about 5 years, last infusion was in 2020. She does not notice any change in her symptoms. She is on Cymbalta for fibromyalgia.   Is on disability because of PTSD and lupus. A/PToday-  1. Persistent chronic generalized pain-no objective evidence of active inflammatory arthritis. Suspect chronic pain syndrome and myofascial pain. Increase Lyrica 75 mg twice daily. She is already on Cymbalta. Recommend massage therapy, or aquatic therapy wherever patient is able to arrange. She is having difficulty in finding a provider who would accept her insurance for massage therapy. She also has sleep apnea, using CPAP, hopefully that will also help with generalized myofascial pain. 2.  Lupus-indicating remission-no objective findings clinically or lab wise. Normal inflammatory markers. Continue current dose of Benlysta. Check safety labs and disease activity markers. 3.  Raynaud phenomena--stable without tissue loss, on amlodipine 2.5 mg a day    Call with any flares, follow-up in 3 months. Patient indicates understanding and agrees with the management plan. I reviewed patient's history, referral documents and electronic medical records. Outside rheumatology records are scanned and reviewed as well as information from care everywhere is reviewed including rheumatology note, labs. #######################################################################    Subjective-  She continues to have chronic generalized body pain-all muscles, joints in her body is sore, achy, stiff. No overt swelling. Lupus activity markers inflammatory markers remain normal.  She used to see pain management, has had several epidural injections, narcotics. She is having difficulty in establishing care with pain management. Lyrica has helped to some extent but is not enough. She denies any active rash, photosensitivity, alopecia, mucositis, pleurisy, GI/ symptoms. She is trying to find a massage therapist, is having difficulty in finding who is under her insurance. Fatigue is persisting. Affecting ADLs and recreational activities. No side effects from medications.   All other ROS are negative. PMH, PSH,Social history , Meds reviewed. PHYSICAL EXAM:    Vitals: There were no vitals taken for this visit. AA)x3 well nourished, and well groomed, normal judgement. MKS: Other chronic generalized body pain-muscles, joints, skin-no objective findings of active inflammatory arthritis. No appreciable tender, swollen or inflamed joints in upper or lower extremities, full range of motion of peripheral joints. Subjective paraspinal cervical and lumbar, trapezius muscle stiffness. Normal gait and muscle strength in upper and lower extremities. Skin: No rashes, no induration or skin thickening or nodules. HEENT: Normal lids, lacrimal glands and pupils. No oral or nasal ulcers. Salivary glands reveal no evidence of abnormality. External inspection of the ears and nose within normal limits. Neck: No adenopathy or thyroid enlargement.     DATA:   Lab Results   Component Value Date    WBC 3.0 (L) 05/10/2022    HGB 13.2 05/10/2022    HCT 38.9 05/10/2022    MCV 88.4 05/10/2022     05/10/2022         Chemistry        Component Value Date/Time     05/10/2022 1146    K 4.2 05/10/2022 1146    K 3.8 12/08/2021 1008     05/10/2022 1146    CO2 22 05/10/2022 1146    BUN 14 05/10/2022 1146    CREATININE 0.8 05/10/2022 1146        Component Value Date/Time    CALCIUM 9.5 05/10/2022 1146    ALKPHOS 70 05/10/2022 1146    AST 23 05/10/2022 1146    ALT 20 05/10/2022 1146    BILITOT 0.3 05/10/2022 1146          No results found for: [facility]  Lab Results   Component Value Date    CRP <3.0 03/23/2022     Lab Results   Component Value Date    IRIS POSITIVE (A) 01/22/2021    SEDRATE 8 03/23/2022     No results found for: CKTOTAL  Normal protein creatinine ratio from 10/20/2021  Total time 32 minutes that includes the following-  Preparing to see the patient such as reviewing patients records, pre-charting, preparing the visit on the same day, performing a medically appropriate history and physical examination, counseling and educating patient about diagnosis, management plan, ordering appropriate testings, prescriptions, communicating findings to other care providers, and documenting clinical information in electronic medical record. A/P- See above.

## 2022-06-20 ENCOUNTER — OFFICE VISIT (OUTPATIENT)
Dept: ENDOCRINOLOGY | Age: 32
End: 2022-06-20
Payer: MEDICARE

## 2022-06-20 VITALS
HEART RATE: 96 BPM | DIASTOLIC BLOOD PRESSURE: 85 MMHG | BODY MASS INDEX: 25.23 KG/M2 | RESPIRATION RATE: 14 BRPM | HEIGHT: 66 IN | WEIGHT: 157 LBS | SYSTOLIC BLOOD PRESSURE: 120 MMHG | TEMPERATURE: 98 F

## 2022-06-20 DIAGNOSIS — E03.9 ACQUIRED HYPOTHYROIDISM: Primary | ICD-10-CM

## 2022-06-20 PROCEDURE — 99214 OFFICE O/P EST MOD 30 MIN: CPT | Performed by: INTERNAL MEDICINE

## 2022-06-20 RX ORDER — LEVOTHYROXINE SODIUM 0.05 MG/1
TABLET ORAL
Qty: 90 TABLET | Refills: 0 | Status: SHIPPED | OUTPATIENT
Start: 2022-06-20 | End: 2022-06-20 | Stop reason: SDUPTHER

## 2022-06-20 RX ORDER — LEVOTHYROXINE SODIUM 0.05 MG/1
TABLET ORAL
Qty: 90 TABLET | Refills: 3 | Status: SHIPPED | OUTPATIENT
Start: 2022-06-20 | End: 2022-10-07 | Stop reason: SDUPTHER

## 2022-06-20 NOTE — PROGRESS NOTES
Subjective:      36 y/o WF who is here for thyroid evaluation.      She was diagnosed with hypothyroidism    Interim:    Ran out of medication for 2 months  Fatigue, weight gain, exhausted  Notices being of medication    Labs:    1/21 showed TSH 0.03 FT4 0.7    4/17 TSH 1.9  3/11 TSH 1.68    Started levothyroxine 25mcg daily by PCP    Initial complaints: sleep issues, no cycle since Oct    Mild, controlled  No worsening factors    No improvement with levothyroxine  No relieving factors    History of radiation to patient's neck:  no  Family history includes mom thyroid issues  Family history of thyroid cancer: no  2/17 MRI brain no pituitary abnormality mentioned  2018_ Thyroid [facility] normal    1/21 prolactin was elevated  27.1    Repeat normal 2/21    She had low DHEA-S 39.5 on 221  Testosterone 0.6 Nl    Reports no cycles since Oct  Has seen Gyn    2/21 Estradiol 26  FSH 5.8 LH 5.5    3/21  TSH 4.13 FT4 0.6 FT3 3.0  Will increase levothyroxine dose to 50mcg  Cortisol 7.7 IGF-1 nl    4/21  TSH 2.0 free level nl    5/22 TSH 2.01    Repeat in 4 months    No h/o infertility  One live birth, h/o miscarriages  No hirsutism, acne    She has SLE    She has a head concussion in the past    SH: Lives with wife  She has one child    Past Medical History:   Diagnosis Date    Anxiety     Asthma     Bulimia     Depression     Environmental allergies     Fibromyalgia 3/17/2021    Fibromyalgia syndrome     LEONEL (generalized anxiety disorder) 3/17/2021    Gestational hypertension, antepartum     High risk medication use 10/5/2021    Lupus ([address]     just diagnosised with lupus last week    Other specified hypothyroidism 3/17/2021    Seizures ([address]      Past Surgical History:   Procedure Laterality Date    ARM SURGERY Right 7/16/2021    RIGHT ANTERIOR NERVE TRANSPOSITION AT THE ELBOW performed by Nixon Echeverria MD at [address] [place] [address]  [id number] Bilateral 2020    LYMPHADENECTOMY  2012    TONSILLECTOMY  2013    WRIST SURGERY Right 2/26/2021    RIGHT WRIST DEQUERVAINS RELEASE performed by Melanie Maxwell MD at [address]     Current Outpatient Medications   Medication Sig Dispense Refill    pregabalin (LYRICA) 75 MG capsule Take 1 capsule by mouth 2 times daily for 30 days. 60 capsule 2    AIMOVIG 140 MG/ML SOAJ INJECT 140 MG (1 ML) SUBCUTANEOUSLY EVERY 30 DAYS 140 mL 0    risperiDONE (RISPERDAL) 0.25 MG tablet Take 0.25 mg by mouth 2 times daily      Lansoprazole (PREVACID PO) Take by mouth      traZODone (DESYREL) 50 MG tablet TAKE 1 TABLET BY MOUTH EVERY DAY AT NIGHT 90 tablet 1    Rimegepant Sulfate (NURTEC) 75 MG TBDP Take 1 tablet at the onset of the severe headache. Not to exceed 1 tablet in 48 hours. 8 tablet 1    DULoxetine (CYMBALTA) 60 MG extended release capsule TAKE 1 CAPSULE BY MOUTH EVERY DAY 90 capsule 1    clotrimazole-betamethasone (LOTRISONE) 1-0.05 % cream Apply topically 2 times daily. (Patient taking differently: as needed Apply topically 2 times daily. ) 15 g 0    Albuterol Sulfate (PROAIR HFA IN) Inhale 1 puff into the lungs every 4 hours as needed      hydrOXYzine (ATARAX) 50 MG tablet Take 50 mg by mouth 3 times daily as needed for Anxiety       mometasone (ELOCON) 0.1 % cream Apply topically daily. 15 g 2    amLODIPine (NORVASC) 2.5 MG tablet TAKE 1 TABLET BY MOUTH EVERY DAY (Patient not taking: Reported on 6/20/2022) 90 tablet 1    levothyroxine (SYNTHROID) 50 MCG tablet TAKE 1 TABLET BY MOUTH EVERY DAY (Patient not taking: Reported on 4/20/2022) 90 tablet 0     No current facility-administered medications for this visit. Review of Systems  Please see scanned     Objective: There were no vitals taken for this visit.   Wt Readings from Last 3 Encounters:   06/15/22 159 lb 9.6 oz (72.4 kg)   05/18/22 157 lb 12.8 oz (71.6 kg)   04/20/22 157 lb (71.2 kg)     Vitals:    06/20/22 1608   BP: 120/85   Pulse: 96   Resp: 14   Temp: 98 °F (36.7 °C) Constitutional: Well-developed, appears stated age, cooperative, in no acute distress  H/E/N/M/T:atraumatic, normocephalic, external ears, nose, lips normal without lesions  Eyes: Lids, lashes, conjunctivae and sclerae normal, No proptosis, no redness  Neck: supple, symmetrical, no swelling  Skin: No obvious rashes or lesions present. Skin and hair texture normal  Psychiatric: Judgement and Insight:  judgement and insight appear normal  Neuro: Normal without focal findings, speech is normal normal, speech is spontaneous  Chest: No labored breathing, no chest deformity, no stridor  Musculoskeletal: No joint deformity, swelling      Lab Review  Lab Results   Component Value Date    TSH 2.01 05/10/2022     No results found for: FREET4      Assessment: 1. Hypothyroidism: TSH low with low FT4 suggestive of central hypothyroidism  MRI did not show any pituitary abnormality  Ran out of medication. She has symptoms. Will check levels and then restart medication  2. Elevated prolactin: Repeat normal  3. SLE  4. Irregular Cycles: Testosterone normal, DHEA-S low, not suggestive of PCOS. Repeat prolactin normal. No improvement with levothyroxine. FSH, LH normal. Estradiol is low normal. Will get records of brain imaging. Recommend to f/u with Gyn    Plan: 1. Check TFT  2.Start levothyroxine 50mcg after labs    F/u in a year

## 2022-06-22 DIAGNOSIS — E03.9 ACQUIRED HYPOTHYROIDISM: ICD-10-CM

## 2022-06-22 LAB
T3 FREE: 3.4 PG/ML (ref 2.3–4.2)
T4 FREE: 0.9 NG/DL (ref 0.9–1.8)
TSH REFLEX: 2.53 UIU/ML (ref 0.27–4.2)

## 2022-06-23 NOTE — PROGRESS NOTES
TSH 2.53 FT4 0.9 FT3 3.4    Level improved, she had ran out of medication  Discussed with patient, given symptoms, wants to restart medication  Advised start back on previous dose but assess for hyperthyroidism.     Advised f/u in 3-4 months

## 2022-06-30 ENCOUNTER — OFFICE VISIT (OUTPATIENT)
Dept: NEUROLOGY | Age: 32
End: 2022-06-30
Payer: MEDICARE

## 2022-06-30 VITALS
WEIGHT: 157 LBS | BODY MASS INDEX: 25.23 KG/M2 | HEIGHT: 66 IN | DIASTOLIC BLOOD PRESSURE: 75 MMHG | HEART RATE: 104 BPM | SYSTOLIC BLOOD PRESSURE: 118 MMHG

## 2022-06-30 DIAGNOSIS — G47.33 OSA (OBSTRUCTIVE SLEEP APNEA): ICD-10-CM

## 2022-06-30 DIAGNOSIS — M79.7 FIBROMYALGIA: ICD-10-CM

## 2022-06-30 DIAGNOSIS — G43.119 INTRACTABLE MIGRAINE WITH AURA WITHOUT STATUS MIGRAINOSUS: Primary | ICD-10-CM

## 2022-06-30 PROCEDURE — 99214 OFFICE O/P EST MOD 30 MIN: CPT | Performed by: PSYCHIATRY & NEUROLOGY

## 2022-06-30 RX ORDER — ERENUMAB-AOOE 140 MG/ML
INJECTION, SOLUTION SUBCUTANEOUS
Qty: 140 ML | Refills: 5 | Status: SHIPPED | OUTPATIENT
Start: 2022-06-30

## 2022-06-30 RX ORDER — RIMEGEPANT SULFATE 75 MG/75MG
TABLET, ORALLY DISINTEGRATING ORAL
Qty: 8 TABLET | Refills: 1 | Status: SHIPPED | OUTPATIENT
Start: 2022-06-30

## 2022-06-30 NOTE — PROGRESS NOTES
[place]   Neurology followup    Subjective:   CC/HP  History was obtained from the patient. Patient is currently on Aimovig 140 mg every 30 days. She is having some trouble with insurance. Every 30 days and sometimes it is related. Patient states that she still gets headaches at least a few times a month and she has to take 1 or 2 tablets at least twice a month and sometimes more. No side effects to medication. Detailed initial history:  Patient states that she has had migraine headaches for a number of years. She used to live in [place] and was seen by neurology there. They have tried her on several medications including topiramate amitriptyline and Botox injections. These were not very helpful. She was then put on Emgality. This seemed to help the most.  She also got trigger point injections at times. Patient then moved to [place]. She has been off the Emgality injections. She continues to have severe headaches. The headaches are approximately 1-3 headaches a week. They can last for several hours. Bright light and loud noise may make the headaches worse and sleeping in a quiet dark room would make it better. Patient also has scintillating scotoma. Patient also has nausea with severe headaches. No other focal neurological symptoms. Patient sister also has migraines. Patient has no systemic lupus erythematosus.   Patient states that she had seizure-like episodes in [place] but was never treated as such for epilepsy    REVIEW OF SYSTEMS    Constitutional:  []   Chills   [x]  Fatigue   []  Fevers   []  Malaise   []  Weight loss     [x] Denies all of the above    Respiratory:   []  Cough    []  Shortness of breath         [x] Denies all of the above     Cardiovascular:   []  Chest pain    []  Exertional chest pressure/discomfort           [] Palpitations    []  Syncope     [x] Denies all of the above        Past Medical History:   Diagnosis Date    Anxiety     Asthma  Bulimia     Depression     Environmental allergies     Fibromyalgia 3/17/2021    Fibromyalgia syndrome     LEONEL (generalized anxiety disorder) 3/17/2021    Gestational hypertension, antepartum     High risk medication use 10/5/2021    Lupus ([facility] [address]     just diagnosised with lupus last week    Other specified hypothyroidism 3/17/2021    Seizures ([facility] [address]      Family History   Problem Relation Age of Onset    Hypertension Mother     Thyroid Disease Mother     Depression Father     Sleep Apnea Father     Depression Sister     Anxiety Disorder Sister     Migraines Sister     Glaucoma Maternal Grandmother     Heart Attack Maternal Grandfather     Heart Disease Maternal Grandfather     Cancer Paternal Grandmother     Cancer Paternal Grandfather      Social History     Socioeconomic History    Marital status: Single     Spouse name: Not on file    Number of children: Not on file    Years of education: Not on file    Highest education level: Not on file   Occupational History    Not on file   Tobacco Use    Smoking status: Former Smoker     Packs/day: 0.20     Types: Cigarettes     Start date:      Quit date:      Years since quittin.4    Smokeless tobacco: Former User   Vaping Use    Vaping Use: Never used   Substance and Sexual Activity    Alcohol use: No    Drug use: Not Currently    Sexual activity: Not on file   Other Topics Concern    Not on file   Social History Narrative    Not on file     Social Determinants of Health     Financial Resource Strain:     Difficulty of Paying Living Expenses: Not on file   Food Insecurity:     Worried About [address] in the Last Year: Not on file    [address] in the Last Year: Not on file   Transportation Needs:     Lack of Transportation (Medical): Not on file    Lack of Transportation (Non-Medical):  Not on file   Physical Activity:     Days of Exercise per Week: Not on file    Minutes of Exercise per Session: Not on file   Stress:     Feeling of Stress : Not on file   Social Connections:     Frequency of Communication with Friends and Family: Not on file    Frequency of Social Gatherings with Friends and Family: Not on file    Attends Muslim Services: Not on file    Active Member of Clubs or Organizations: Not on file    Attends Club or Organization Meetings: Not on file    Marital Status: Not on file   Intimate Partner Violence:     Fear of Current or Ex-Partner: Not on file    Emotionally Abused: Not on file    Physically Abused: Not on file    Sexually Abused: Not on file   Housing Stability:     Unable to Pay for Housing in the Last Year: Not on file    Number of [place] in the Last Year: Not on file    Unstable Housing in the Last Year: Not on file        Objective:  Exam:  /75   Pulse (!) 104   Ht 5' 6\" (1.676 m)   Wt 157 lb (71.2 kg)   BMI 25.34 kg/m²   This is a well-nourished patient in no acute distress  Patient is awake, alert and oriented x3. Speech is normal.  Pupils are equal round reacting to light. Extraocular movements intact. Face symmetrical. Tongue midline. Motor exam shows normal symmetrical strength. Deep tendon reflexes normal. Plantar reflexes downgoing. Sensory exam normal. Coordination normal. Gait normal. No carotid bruit. No neck stiffness.       Data :  LABS:  General Labs:    CBC:   Lab Results   Component Value Date/Time    WBC 2.8 06/15/2022 08:42 AM    RBC 4.37 06/15/2022 08:42 AM    HGB 13.2 06/15/2022 08:42 AM    HCT 38.6 06/15/2022 08:42 AM    MCV 88.2 06/15/2022 08:42 AM    MCH 30.2 06/15/2022 08:42 AM    MCHC 34.2 06/15/2022 08:42 AM    RDW 13.3 06/15/2022 08:42 AM     06/15/2022 08:42 AM    MPV 8.6 06/15/2022 08:42 AM     BMP:    Lab Results   Component Value Date/Time     05/10/2022 11:46 AM    K 4.2 05/10/2022 11:46 AM    K 3.8 12/08/2021 10:08 AM     05/10/2022 11:46 AM    CO2 22 05/10/2022 11:46 AM    BUN 14 05/10/2022 11:46 AM LABALBU 4.7 05/10/2022 11:46 AM    CREATININE 0.7 06/15/2022 08:42 AM    CALCIUM 9.5 05/10/2022 11:46 AM    GFRAA >60 06/15/2022 08:42 AM    GFRAA >60 08/31/2011 08:10 PM    LABGLOM >60 06/15/2022 08:42 AM    GLUCOSE 92 05/10/2022 11:46 AM       Impression :  Migraine headaches, not well controlled  Fibromyalgia  Patient states that she has been tried on several preventative medications for migraines in the past including topiramate amitriptyline and Botox injections without much improvement  Obstructive sleep apnea    Plan :  Discussed with patient  She is still trying to get records from her previous neurologist.  Continue Aimovig 140 mg. She will take it subcutaneously every 30 days. Continue Nurtec #8 to be taken on a as needed basis. Continue CPAP for sleep apnea  Return in 3 months        Please note a portion of  this chart was generated using dragon dictation software. Although every effort was made to ensure the accuracy of this automated transcription, some errors in transcription may have occurred.

## 2022-07-13 ENCOUNTER — HOSPITAL ENCOUNTER (OUTPATIENT)
Dept: PHYSICAL THERAPY | Age: 32
Setting detail: THERAPIES SERIES
Discharge: HOME | End: 2022-07-13

## 2022-07-13 ENCOUNTER — NURSE ONLY (OUTPATIENT)
Dept: INFUSION THERAPY | Age: 32
End: 2022-07-13
Payer: MEDICARE

## 2022-07-13 VITALS
WEIGHT: 154 LBS | BODY MASS INDEX: 24.86 KG/M2 | DIASTOLIC BLOOD PRESSURE: 78 MMHG | RESPIRATION RATE: 16 BRPM | SYSTOLIC BLOOD PRESSURE: 119 MMHG | HEART RATE: 82 BPM | TEMPERATURE: 98.6 F

## 2022-07-13 DIAGNOSIS — M32.9 SYSTEMIC LUPUS ERYTHEMATOSUS, UNSPECIFIED SLE TYPE, UNSPECIFIED ORGAN INVOLVEMENT STATUS (HCC): Primary | ICD-10-CM

## 2022-07-13 PROCEDURE — 96413 CHEMO IV INFUSION 1 HR: CPT | Performed by: INTERNAL MEDICINE

## 2022-07-13 RX ORDER — SODIUM CHLORIDE 9 MG/ML
INJECTION, SOLUTION INTRAVENOUS CONTINUOUS
Status: CANCELLED | OUTPATIENT
Start: 2022-08-10

## 2022-07-13 RX ORDER — ACETAMINOPHEN 325 MG/1
650 TABLET ORAL ONCE
Status: DISCONTINUED | OUTPATIENT
Start: 2022-07-13 | End: 2022-07-13 | Stop reason: HOSPADM

## 2022-07-13 RX ORDER — METHYLPREDNISOLONE SODIUM SUCCINATE 125 MG/2ML
40 INJECTION, POWDER, LYOPHILIZED, FOR SOLUTION INTRAMUSCULAR; INTRAVENOUS ONCE
Status: CANCELLED | OUTPATIENT
Start: 2022-08-10 | End: 2022-08-10

## 2022-07-13 RX ORDER — EPINEPHRINE 1 MG/ML
0.3 INJECTION, SOLUTION, CONCENTRATE INTRAVENOUS PRN
Status: CANCELLED | OUTPATIENT
Start: 2022-08-10

## 2022-07-13 RX ORDER — ACETAMINOPHEN 325 MG/1
650 TABLET ORAL ONCE
Status: CANCELLED | OUTPATIENT
Start: 2022-08-10

## 2022-07-13 RX ORDER — HEPARIN SODIUM (PORCINE) LOCK FLUSH IV SOLN 100 UNIT/ML 100 UNIT/ML
500 SOLUTION INTRAVENOUS PRN
Status: CANCELLED | OUTPATIENT
Start: 2022-08-10

## 2022-07-13 RX ORDER — DIPHENHYDRAMINE HYDROCHLORIDE 50 MG/ML
50 INJECTION INTRAMUSCULAR; INTRAVENOUS ONCE
Status: CANCELLED | OUTPATIENT
Start: 2022-08-10 | End: 2022-08-10

## 2022-07-13 RX ORDER — SODIUM CHLORIDE 0.9 % (FLUSH) 0.9 %
5 SYRINGE (ML) INJECTION PRN
Status: CANCELLED | OUTPATIENT
Start: 2022-08-10

## 2022-07-13 RX ORDER — SODIUM CHLORIDE 0.9 % (FLUSH) 0.9 %
10 SYRINGE (ML) INJECTION PRN
Status: CANCELLED | OUTPATIENT
Start: 2022-08-10

## 2022-07-13 RX ORDER — FAMOTIDINE 10 MG/ML
20 INJECTION, SOLUTION INTRAVENOUS ONCE
Status: CANCELLED | OUTPATIENT
Start: 2022-08-10 | End: 2022-08-10

## 2022-07-13 NOTE — PATIENT INSTRUCTIONS
Your next Infusion is scheduled at   [address] [facility] [address]  [place], [address] [facility]  [phone]

## 2022-07-13 NOTE — PROGRESS NOTES
7/13/2022   Pt here for Benlysta infusion. ay: cbc, creatinine, crp, AST, ALT, and sed rate drawn. No  Adverse effects from previous infusion. Today 154 lbs =69 kg   X 10 mg/kg = 690 mg, Rounded to 720 mg per MD. Remains therapeutic on current dose. Dr Jesi Rodríguez aware of weight loss, No dose change. Infusion  tolerated well. Vitals stable.  Next visit scheduled  in 4 weeks, [address]    IV Gauge: #24  IV Site: left antecubital  # of Attempts: 1  Infusion Start time: 1045 am  Infusion Stop time: 1145 am          See Therapy Plan, flowsheets and MAR  Administrations This Visit     belimumab (BENLYSTA) 696 mg in sodium chloride 0.9 % 250 mL infusion     Admin Date  07/13/2022  10:45 Action  New Bag Dose  720 mg Rate  250 mL/hr Route  IntraVENous Site   Administered By  Charlotte Ho RN    Ordering Provider: Maykel Lima MD    [address]: [id number], [id number]    Lot#: [id number]    : [facility], [address] [place] [address]    Patient Supplied?: No    Comments: Benlysta 720 mg today no premedication per Dr Jesi Rodríguez

## 2022-07-19 DIAGNOSIS — G43.119 INTRACTABLE MIGRAINE WITH AURA WITHOUT STATUS MIGRAINOSUS: ICD-10-CM

## 2022-07-19 RX ORDER — RIMEGEPANT SULFATE 75 MG/75MG
TABLET, ORALLY DISINTEGRATING ORAL
Qty: 8 TABLET | Refills: 1 | OUTPATIENT
Start: 2022-07-19

## 2022-07-19 NOTE — TELEPHONE ENCOUNTER
Rimegepant Sulfate (NURTEC) 75 MG TBDP 8 tablet 1 6/30/2022     Sig: Take 1 tablet at the onset of the severe headache. Not to exceed 1 tablet in 48 hours.     Sent to pharmacy as: Nurtec 75 MG Oral Tablet Disintegrating (Rimegepant Sulfate)    E-Prescribing Status: Receipt confirmed by pharmacy (6/30/2022 10:15 AM EDT)

## 2022-08-03 ENCOUNTER — TELEPHONE (OUTPATIENT)
Dept: INTERNAL MEDICINE CLINIC | Age: 32
End: 2022-08-03

## 2022-08-03 NOTE — TELEPHONE ENCOUNTER
----- Message from Renetta Nazario sent at 8/3/2022 10:41 AM EDT -----  Subject: Referral Request    Reason for referral request? Please assist pt with a psychiatristv as she   wants to change. The one she has now is not covered by ins. Please reach   out to pt to discuss. Provider patient wants to be referred to(if known):     Provider Phone Number(if known):     Additional Information for Provider?   ---------------------------------------------------------------------------  --------------  [id number] [employer]    [phone]; OK to leave message on voicemail  ---------------------------------------------------------------------------  --------------

## 2022-08-04 RX ORDER — DULOXETIN HYDROCHLORIDE 60 MG/1
CAPSULE, DELAYED RELEASE ORAL
Qty: 90 CAPSULE | Refills: 1 | OUTPATIENT
Start: 2022-08-04

## 2022-08-10 ENCOUNTER — NURSE ONLY (OUTPATIENT)
Dept: INFUSION THERAPY | Age: 32
End: 2022-08-10
Payer: MEDICARE

## 2022-08-10 VITALS
BODY MASS INDEX: 24.21 KG/M2 | TEMPERATURE: 98.2 F | RESPIRATION RATE: 16 BRPM | SYSTOLIC BLOOD PRESSURE: 110 MMHG | HEART RATE: 76 BPM | DIASTOLIC BLOOD PRESSURE: 70 MMHG | WEIGHT: 150 LBS

## 2022-08-10 DIAGNOSIS — M32.9 SYSTEMIC LUPUS ERYTHEMATOSUS, UNSPECIFIED SLE TYPE, UNSPECIFIED ORGAN INVOLVEMENT STATUS (HCC): Primary | ICD-10-CM

## 2022-08-10 PROCEDURE — 96413 CHEMO IV INFUSION 1 HR: CPT | Performed by: INTERNAL MEDICINE

## 2022-08-10 RX ORDER — EPINEPHRINE 1 MG/ML
0.3 INJECTION, SOLUTION, CONCENTRATE INTRAVENOUS PRN
Status: CANCELLED | OUTPATIENT
Start: 2022-09-07

## 2022-08-10 RX ORDER — DIPHENHYDRAMINE HYDROCHLORIDE 50 MG/ML
50 INJECTION INTRAMUSCULAR; INTRAVENOUS ONCE
Status: CANCELLED | OUTPATIENT
Start: 2022-09-07 | End: 2022-09-07

## 2022-08-10 RX ORDER — SODIUM CHLORIDE 9 MG/ML
INJECTION, SOLUTION INTRAVENOUS CONTINUOUS
Status: CANCELLED | OUTPATIENT
Start: 2022-09-07

## 2022-08-10 RX ORDER — METHYLPREDNISOLONE SODIUM SUCCINATE 125 MG/2ML
40 INJECTION, POWDER, LYOPHILIZED, FOR SOLUTION INTRAMUSCULAR; INTRAVENOUS ONCE
Status: CANCELLED | OUTPATIENT
Start: 2022-09-07 | End: 2022-09-07

## 2022-08-10 RX ORDER — FAMOTIDINE 10 MG/ML
20 INJECTION, SOLUTION INTRAVENOUS ONCE
Status: CANCELLED | OUTPATIENT
Start: 2022-09-07 | End: 2022-09-07

## 2022-08-10 RX ORDER — SODIUM CHLORIDE 0.9 % (FLUSH) 0.9 %
10 SYRINGE (ML) INJECTION PRN
Status: CANCELLED | OUTPATIENT
Start: 2022-09-07

## 2022-08-10 RX ORDER — METHYLPHENIDATE HYDROCHLORIDE EXTENDED RELEASE 20 MG/1
20 TABLET ORAL EVERY MORNING
COMMUNITY

## 2022-08-10 RX ORDER — HEPARIN SODIUM (PORCINE) LOCK FLUSH IV SOLN 100 UNIT/ML 100 UNIT/ML
500 SOLUTION INTRAVENOUS PRN
Status: CANCELLED | OUTPATIENT
Start: 2022-09-07

## 2022-08-10 RX ORDER — SODIUM CHLORIDE 0.9 % (FLUSH) 0.9 %
5 SYRINGE (ML) INJECTION PRN
Status: CANCELLED | OUTPATIENT
Start: 2022-09-07

## 2022-08-10 RX ORDER — ACETAMINOPHEN 325 MG/1
650 TABLET ORAL ONCE
Status: CANCELLED | OUTPATIENT
Start: 2022-09-07

## 2022-08-10 NOTE — PROGRESS NOTES
8/10/2022  Pt here for Benlysta infusion. Patient denies current infections, illnesses, or recent surgeries. No  Adverse effects from previous infusion. Today 150 lbs =68 kg   X 10 mg/kg = 680 mg, Rounded to 720 mg per MD.  Infusion  tolerated well. Vitals stable. Next visit scheduled  in 4 weeks.     IV Gauge: #24  IV Site: left antecubital  # of Attempts: 1  Infusion Start time: 0935 am  Infusion Stop time: 1035 am      See Therapy Plan, flowsheets and MAR  Administrations This Visit       belimumab (BENLYSTA) 680 mg in sodium chloride 0.9 % 250 mL infusion       Admin Date  08/10/2022  09:35 Action  New Bag Dose  720 mg Rate  250 mL/hr Route  IntraVENous Site   Administered By  Gavi High RN    Ordering Provider: Rolando Gutierrez MD    Patient Supplied?: No    Comments: benlysta 720 mg pr Dr Renay Luu orders

## 2022-08-18 ENCOUNTER — OFFICE VISIT (OUTPATIENT)
Dept: INTERNAL MEDICINE CLINIC | Age: 32
End: 2022-08-18
Payer: MEDICARE

## 2022-08-18 VITALS
SYSTOLIC BLOOD PRESSURE: 128 MMHG | WEIGHT: 147 LBS | HEIGHT: 66 IN | DIASTOLIC BLOOD PRESSURE: 68 MMHG | BODY MASS INDEX: 23.63 KG/M2

## 2022-08-18 DIAGNOSIS — G43.109 MIGRAINE WITH AURA AND WITHOUT STATUS MIGRAINOSUS, NOT INTRACTABLE: Primary | ICD-10-CM

## 2022-08-18 DIAGNOSIS — Z11.3 SCREEN FOR STD (SEXUALLY TRANSMITTED DISEASE): ICD-10-CM

## 2022-08-18 DIAGNOSIS — F41.1 GAD (GENERALIZED ANXIETY DISORDER): Chronic | ICD-10-CM

## 2022-08-18 DIAGNOSIS — M32.9 SYSTEMIC LUPUS ERYTHEMATOSUS, UNSPECIFIED SLE TYPE, UNSPECIFIED ORGAN INVOLVEMENT STATUS (HCC): ICD-10-CM

## 2022-08-18 LAB — HEPATITIS C ANTIBODY INTERPRETATION: NORMAL

## 2022-08-18 PROCEDURE — 99214 OFFICE O/P EST MOD 30 MIN: CPT | Performed by: INTERNAL MEDICINE

## 2022-08-18 RX ORDER — CLOTRIMAZOLE AND BETAMETHASONE DIPROPIONATE 10; .64 MG/G; MG/G
CREAM TOPICAL 2 TIMES DAILY PRN
Qty: 45 G | Refills: 1 | Status: SHIPPED | OUTPATIENT
Start: 2022-08-18

## 2022-08-18 SDOH — ECONOMIC STABILITY: FOOD INSECURITY: WITHIN THE PAST 12 MONTHS, THE FOOD YOU BOUGHT JUST DIDN'T LAST AND YOU DIDN'T HAVE MONEY TO GET MORE.: NEVER TRUE

## 2022-08-18 SDOH — ECONOMIC STABILITY: FOOD INSECURITY: WITHIN THE PAST 12 MONTHS, YOU WORRIED THAT YOUR FOOD WOULD RUN OUT BEFORE YOU GOT MONEY TO BUY MORE.: NEVER TRUE

## 2022-08-18 ASSESSMENT — PATIENT HEALTH QUESTIONNAIRE - PHQ9
SUM OF ALL RESPONSES TO PHQ QUESTIONS 1-9: 2
1. LITTLE INTEREST OR PLEASURE IN DOING THINGS: 1
SUM OF ALL RESPONSES TO PHQ QUESTIONS 1-9: 2
SUM OF ALL RESPONSES TO PHQ QUESTIONS 1-9: 2
2. FEELING DOWN, DEPRESSED OR HOPELESS: 1
SUM OF ALL RESPONSES TO PHQ QUESTIONS 1-9: 2
SUM OF ALL RESPONSES TO PHQ9 QUESTIONS 1 & 2: 2

## 2022-08-18 ASSESSMENT — SOCIAL DETERMINANTS OF HEALTH (SDOH): HOW HARD IS IT FOR YOU TO PAY FOR THE VERY BASICS LIKE FOOD, HOUSING, MEDICAL CARE, AND HEATING?: NOT HARD AT ALL

## 2022-08-18 NOTE — PROGRESS NOTES
Sydni Isabel (:  1990) is a 33 y.o. adult,Established patient, here for evaluation of the following chief complaint(s): Other (STD testing ) and ADHD         ASSESSMENT/PLAN:  1. Migraine with aura and without status migrainosus, not intractable  -To schedule with  headache clinic when her insurance changes  -     External Referral To Neurology  2. LEONEL (generalized anxiety disorder)   -Likely ADHD, discussed options for screening, will continue to work on seeing a psychologist  3. Systemic lupus erythematosus, unspecified SLE type, unspecified organ involvement status ([facility] [address]   -Stable, seeing rheumatology  4. Screen for STD (sexually transmitted disease)  -     Hepatitis C Antibody; Future  -     HIV Screen; Future  -     Syphilis Antibody Cascading Reflex; Future  -     C.trachomatis N.gonorrhoeae DNA, Urine; Future    Return if symptoms worsen or fail to improve. Subjective   SUBJECTIVE/OBJECTIVE:  HPI    She is seeing a psychiatrist, recently did a trial of stimulants as a psychiatrist thinks she may have ADHD but they would like her to have formal testing and she is struggling with finding a place which can do it. She is trying to find a psychologist, she will be changing insurance which should help. Has a lot of stress recently since her ex passed from cancer. Pain is her biggest issue. She is taking Cymbalta 90 mg, the psychiatrist had increased the dose. Not fully helpful. Still getting Benlysta from the rheumatologist for her lupus, also started on Lyrica but not yet helpful. She has seen spine specialist for her neck, still has symptoms. In the past she had trigger point injections for migraine which was helpful, her current neurologist does not do these so once her insurance changes she will pursue this. She would like STD screening, no current symptoms. Review of Systems       Objective   Physical Exam  Vitals reviewed.    Constitutional:       General: Sydni Isabel is not in acute distress. Appearance: Normal appearance. Kulwinder Augustin is well-developed. HENT:      Head: Normocephalic and atraumatic. Pulmonary:      Effort: Pulmonary effort is normal. No respiratory distress. Skin:     General: Skin is warm and dry. Neurological:      Mental Status: Kulwinder Augustin is alert. Psychiatric:         Behavior: Behavior normal.         Thought Content: Thought content normal.         Judgment: Judgment normal.                An electronic signature was used to authenticate this note.     --Sona Dorsey MD

## 2022-08-19 LAB
HIV AG/AB: NORMAL
HIV ANTIGEN: NORMAL
HIV-1 ANTIBODY: NORMAL
HIV-2 AB: NORMAL
TOTAL SYPHILLIS IGG/IGM: NORMAL

## 2022-08-20 ENCOUNTER — HOSPITAL ENCOUNTER (EMERGENCY)
Age: 32
Discharge: HOME | End: 2022-08-20
Attending: STUDENT IN AN ORGANIZED HEALTH CARE EDUCATION/TRAINING PROGRAM
Payer: MEDICARE

## 2022-08-20 ENCOUNTER — APPOINTMENT (OUTPATIENT)
Dept: MRI IMAGING | Age: 32
End: 2022-08-20
Payer: MEDICARE

## 2022-08-20 VITALS
HEART RATE: 79 BPM | RESPIRATION RATE: 16 BRPM | OXYGEN SATURATION: 100 % | TEMPERATURE: 98.4 F | SYSTOLIC BLOOD PRESSURE: 121 MMHG | DIASTOLIC BLOOD PRESSURE: 86 MMHG | HEIGHT: 66 IN | BODY MASS INDEX: 23.73 KG/M2

## 2022-08-20 DIAGNOSIS — R20.2 ARM PARESTHESIA, LEFT: ICD-10-CM

## 2022-08-20 DIAGNOSIS — M54.2 NECK PAIN: Primary | ICD-10-CM

## 2022-08-20 LAB
ANION GAP SERPL CALCULATED.3IONS-SCNC: 12 MMOL/L (ref 3–16)
BASOPHILS ABSOLUTE: 0 K/UL (ref 0–0.2)
BASOPHILS RELATIVE PERCENT: 0.7 %
BUN BLDV-MCNC: 20 MG/DL (ref 7–20)
C-REACTIVE PROTEIN: <3 MG/L (ref 0–5.1)
C. TRACHOMATIS DNA ,URINE: NEGATIVE
CALCIUM SERPL-MCNC: 9.3 MG/DL (ref 8.3–10.6)
CHLORIDE BLD-SCNC: 103 MMOL/L (ref 99–110)
CO2: 24 MMOL/L (ref 21–32)
CREAT SERPL-MCNC: 0.7 MG/DL (ref 0.6–1.1)
EOSINOPHILS ABSOLUTE: 0.1 K/UL (ref 0–0.6)
EOSINOPHILS RELATIVE PERCENT: 2.6 %
GFR AFRICAN AMERICAN: >60
GFR NON-AFRICAN AMERICAN: >60
GLUCOSE BLD-MCNC: 100 MG/DL (ref 70–99)
HCG(URINE) PREGNANCY TEST: NEGATIVE
HCT VFR BLD CALC: 38.5 % (ref 36–48)
HEMOGLOBIN: 13 G/DL (ref 12–16)
LYMPHOCYTES ABSOLUTE: 1 K/UL (ref 1–5.1)
LYMPHOCYTES RELATIVE PERCENT: 36.5 %
MCH RBC QN AUTO: 29.1 PG (ref 26–34)
MCHC RBC AUTO-ENTMCNC: 33.7 G/DL (ref 31–36)
MCV RBC AUTO: 86.3 FL (ref 80–100)
MONOCYTES ABSOLUTE: 0.3 K/UL (ref 0–1.3)
MONOCYTES RELATIVE PERCENT: 9.1 %
N. GONORRHOEAE DNA, URINE: NEGATIVE
NEUTROPHILS ABSOLUTE: 1.4 K/UL (ref 1.7–7.7)
NEUTROPHILS RELATIVE PERCENT: 51.1 %
PDW BLD-RTO: 12.8 % (ref 12.4–15.4)
PLATELET # BLD: 210 K/UL (ref 135–450)
PMV BLD AUTO: 8.7 FL (ref 5–10.5)
POTASSIUM REFLEX MAGNESIUM: 3.7 MMOL/L (ref 3.5–5.1)
RBC # BLD: 4.47 M/UL (ref 4–5.2)
SEDIMENTATION RATE, ERYTHROCYTE: 3 MM/HR (ref 0–20)
SODIUM BLD-SCNC: 139 MMOL/L (ref 136–145)
WBC # BLD: 2.8 K/UL (ref 4–11)

## 2022-08-20 PROCEDURE — A9579 GAD-BASE MR CONTRAST NOS,1ML: HCPCS | Performed by: STUDENT IN AN ORGANIZED HEALTH CARE EDUCATION/TRAINING PROGRAM

## 2022-08-20 PROCEDURE — 96375 TX/PRO/DX INJ NEW DRUG ADDON: CPT

## 2022-08-20 PROCEDURE — 85025 COMPLETE CBC W/AUTO DIFF WBC: CPT

## 2022-08-20 PROCEDURE — 80048 BASIC METABOLIC PNL TOTAL CA: CPT

## 2022-08-20 PROCEDURE — 6360000004 HC RX CONTRAST MEDICATION: Performed by: STUDENT IN AN ORGANIZED HEALTH CARE EDUCATION/TRAINING PROGRAM

## 2022-08-20 PROCEDURE — 70553 MRI BRAIN STEM W/O & W/DYE: CPT

## 2022-08-20 PROCEDURE — 85652 RBC SED RATE AUTOMATED: CPT

## 2022-08-20 PROCEDURE — 6360000002 HC RX W HCPCS: Performed by: STUDENT IN AN ORGANIZED HEALTH CARE EDUCATION/TRAINING PROGRAM

## 2022-08-20 PROCEDURE — 99285 EMERGENCY DEPT VISIT HI MDM: CPT

## 2022-08-20 PROCEDURE — 36415 COLL VENOUS BLD VENIPUNCTURE: CPT

## 2022-08-20 PROCEDURE — 84703 CHORIONIC GONADOTROPIN ASSAY: CPT

## 2022-08-20 PROCEDURE — 72141 MRI NECK SPINE W/O DYE: CPT

## 2022-08-20 PROCEDURE — 86140 C-REACTIVE PROTEIN: CPT

## 2022-08-20 PROCEDURE — 96374 THER/PROPH/DIAG INJ IV PUSH: CPT

## 2022-08-20 RX ORDER — MORPHINE SULFATE 2 MG/ML
2 INJECTION, SOLUTION INTRAMUSCULAR; INTRAVENOUS ONCE
Status: COMPLETED | OUTPATIENT
Start: 2022-08-20 | End: 2022-08-20

## 2022-08-20 RX ADMIN — GADOTERIDOL 14 ML: 279.3 INJECTION, SOLUTION INTRAVENOUS at 20:58

## 2022-08-20 RX ADMIN — MORPHINE SULFATE 2 MG: 2 INJECTION, SOLUTION INTRAMUSCULAR; INTRAVENOUS at 18:39

## 2022-08-20 ASSESSMENT — ENCOUNTER SYMPTOMS
ABDOMINAL PAIN: 0
SHORTNESS OF BREATH: 0

## 2022-08-20 ASSESSMENT — PAIN SCALES - GENERAL
PAINLEVEL_OUTOF10: 10
PAINLEVEL_OUTOF10: 10

## 2022-08-20 ASSESSMENT — PAIN DESCRIPTION - LOCATION: LOCATION: NECK

## 2022-08-20 ASSESSMENT — PAIN - FUNCTIONAL ASSESSMENT: PAIN_FUNCTIONAL_ASSESSMENT: 0-10

## 2022-08-20 NOTE — ED TRIAGE NOTES
States having a lupus flare and having severe neck pain for the past 4 days. Is under the care of [facility]. Pain in neck radiates into left side of head and into jaw. Also radiates down into her back in left arm.

## 2022-08-20 NOTE — ED PROVIDER NOTES
[facility] RESIDENT NOTE       Date of evaluation: 8/20/2022    Chief Complaint     Neck Pain (States having a lupus flare and having severe neck pain for the past 4 days. Is under the care of [facility]. Pain in neck radiates into left side of head and into jaw. Also radiates down into her back in left arm. )      History of Present Illness     Clarence Sifuentes is a 33 y.o. adult with a history of SLE, fibromyalgia, seizures, LEONEL who presents to the ED with generalized pain all over her body she states she feels like she is having a flare. Her joints hurt all over. And her muscles feel like they are very tense and spasming. Has pain in her neck on the left side. The left side of her body feels achy. She is unable to turn the left side of her neck without having significant pain. She rates the pain at 10 out of 10. He states that she has been under a lot of stress recently being a single parent, and recently a family member dying. She states that sometimes she is not able to take on all of the stress. She states she follows with [facility], and states that she has cervical spinal stenosis. Review of Systems     Review of Systems   Constitutional:  Negative for fever. Respiratory:  Negative for shortness of breath. Cardiovascular:  Negative for chest pain. Gastrointestinal:  Negative for abdominal pain. Genitourinary:  Negative for difficulty urinating. Musculoskeletal:  Positive for arthralgias, myalgias and neck pain. Negative for joint swelling. Neurological:  Positive for light-headedness and numbness.      Past Medical, Surgical, Family, and Social History     Clarence Sifuentes has a past medical history of Anxiety, Asthma, Bulimia, Depression, Environmental allergies, Fibromyalgia, Fibromyalgia syndrome, LEONEL (generalized anxiety disorder), Gestational hypertension, antepartum, High risk medication use, Lupus ([facility] [address] Other specified MCG tablet One tab daily, Disp-90 tablet, R-3Normal      pregabalin (LYRICA) 75 MG capsule Take 1 capsule by mouth 2 times daily for 30 days. , Disp-60 capsule, R-2Normal      risperiDONE (RISPERDAL) 0.25 MG tablet Take 0.25 mg by mouth 2 times dailyHistorical Med      Lansoprazole (PREVACID PO) Take by mouthHistorical Med      traZODone (DESYREL) 50 MG tablet TAKE 1 TABLET BY MOUTH EVERY DAY AT NIGHT, Disp-90 tablet, R-1Normal      DULoxetine (CYMBALTA) 60 MG extended release capsule TAKE 1 CAPSULE BY MOUTH EVERY DAY, Disp-90 capsule, R-1Normal      Albuterol Sulfate (PROAIR HFA IN) Inhale 1 puff into the lungs every 4 hours as neededHistorical Med      hydrOXYzine (ATARAX) 50 MG tablet Take 50 mg by mouth 3 times daily as needed for AnxietyHistorical Med      mometasone (ELOCON) 0.1 % cream Apply topically daily. , Disp-15 g, R-2, Normal             Allergies     Gisela Guallpa is allergic to latex, adhesive tape, pcn [penicillins], bee pollen, glucosamine, nickel, and other. Physical Exam     INITIAL VITALS: BP: (!) 139/91, Temp: 98.4 °F (36.9 °C), Heart Rate: 79, Resp: 16, SpO2: 98 %   Physical Exam  Constitutional:       Appearance: Normal appearance. Eyes:      Conjunctiva/sclera: Conjunctivae normal.   Cardiovascular:      Rate and Rhythm: Normal rate. Pulmonary:      Effort: Pulmonary effort is normal. No respiratory distress. Breath sounds: Normal breath sounds. No wheezing. Musculoskeletal:         General: Normal range of motion. Skin:     General: Skin is warm and dry. Neurological:      Mental Status: Gisela Guallpa is alert. Sensory: Sensory deficit (delayed feeling of sensation on left arm) present. Motor: Weakness (of left arm limited to pain) present. Diagnostic Results     EKG   Interpreted inconjunction with emergency department physician Jacque Robertson MD  Not done    RADIOLOGY:  MRI BRAIN W WO CONTRAST   Final Result   HEAD:   Normal MRI of the brain. CERVICAL SPINE:   No cervical cord signal abnormality or abnormal cord enhancement. Mild multilevel cervical spondylosis as detailed. Moderate right C5 foraminal stenosis. Mild right C4, left C5, and bilateral C6 foraminal stenosis. MRI CERVICAL SPINE WO CONTRAST   Final Result   HEAD:   Normal MRI of the brain. CERVICAL SPINE:   No cervical cord signal abnormality or abnormal cord enhancement. Mild multilevel cervical spondylosis as detailed. Moderate right C5 foraminal stenosis. Mild right C4, left C5, and bilateral C6 foraminal stenosis.              LABS:   Results for orders placed or performed during the hospital encounter of 08/20/22   CBC with Auto Differential   Result Value Ref Range    WBC 2.8 (L) 4.0 - 11.0 K/uL    RBC 4.47 4.00 - 5.20 M/uL    Hemoglobin 13.0 12.0 - 16.0 g/dL    Hematocrit 38.5 36.0 - 48.0 %    MCV 86.3 80.0 - 100.0 fL    MCH 29.1 26.0 - 34.0 pg    MCHC 33.7 31.0 - 36.0 g/dL    RDW 12.8 12.4 - 15.4 %    Platelets 254 827 - 373 K/uL    MPV 8.7 5.0 - 10.5 fL    Neutrophils % 51.1 %    Lymphocytes % 36.5 %    Monocytes % 9.1 %    Eosinophils % 2.6 %    Basophils % 0.7 %    Neutrophils Absolute 1.4 (L) 1.7 - 7.7 K/uL    Lymphocytes Absolute 1.0 1.0 - 5.1 K/uL    Monocytes Absolute 0.3 0.0 - 1.3 K/uL    Eosinophils Absolute 0.1 0.0 - 0.6 K/uL    Basophils Absolute 0.0 0.0 - 0.2 K/uL   Sedimentation Rate   Result Value Ref Range    Sed Rate 3 0 - 20 mm/Hr   C-Reactive Protein   Result Value Ref Range    CRP <3.0 0.0 - 5.1 mg/L   Basic Metabolic Panel w/ Reflex to MG   Result Value Ref Range    Sodium 139 136 - 145 mmol/L    Potassium reflex Magnesium 3.7 3.5 - 5.1 mmol/L    Chloride 103 99 - 110 mmol/L    CO2 24 21 - 32 mmol/L    Anion Gap 12 3 - 16    Glucose 100 (H) 70 - 99 mg/dL    BUN 20 7 - 20 mg/dL    Creatinine 0.7 0.6 - 1.1 mg/dL    GFR Non-African American >60 >60    GFR African American >60 >60    Calcium 9.3 8.3 - 10.6 mg/dL   hCG, qualitative, pain.    Patient should follow-up with her spinal surgeon as an outpatient for her foraminal stenosis. She did follow-up with her primary care provider for management of her chronic pain. Return precautions given. This patient was also evaluated by the attending physician. All care plans were discussed and agreed upon. Clinical Impression     1. Neck pain    2. Arm paresthesia, left        Disposition     PATIENT REFERRED TO:  Savannah Maldonado MD  [address]  [phone]    Call in 2 days  As needed, If symptoms worsen    [facility]  [place] [address]  [phone]  Call       DISCHARGE MEDICATIONS:  Discharge Medication List as of 8/20/2022 10:42 PM          DISPOSITION Decision To Discharge 08/20/2022 10:40:13 PM      Danilo Morales MD  Resident  08/21/22 [id number]

## 2022-08-21 NOTE — DISCHARGE INSTRUCTIONS
-You were seen in the emergency department for a flare of pain and neck pain radiating down your left arm  -In the emergency department, your labs were reassuring with negative inflammatory markers  -The MRI of your brain was normal and the MRI of your neck showed stenosis but no compression on your spinal cord  -Please follow-up with a spine surgeon as an outpatient for follow-up of the stenosis in your neck.   We have attached a phone number for neurosurgery below.  -Follow-up with your primary care doctor and pain management specialist for ongoing symptoms  -Return to the ER for new or worsening symptoms including fevers, worsening pain, weakness or numbness or other symptoms that are concerning to you

## 2022-08-22 ENCOUNTER — APPOINTMENT (OUTPATIENT)
Dept: GENERAL RADIOLOGY | Age: 32
End: 2022-08-22
Payer: MEDICARE

## 2022-08-22 ENCOUNTER — HOSPITAL ENCOUNTER (EMERGENCY)
Age: 32
Discharge: HOME | End: 2022-08-22
Attending: EMERGENCY MEDICINE
Payer: MEDICARE

## 2022-08-22 VITALS
TEMPERATURE: 98.2 F | OXYGEN SATURATION: 100 % | HEIGHT: 66 IN | RESPIRATION RATE: 20 BRPM | DIASTOLIC BLOOD PRESSURE: 89 MMHG | SYSTOLIC BLOOD PRESSURE: 127 MMHG | WEIGHT: 150 LBS | HEART RATE: 83 BPM | BODY MASS INDEX: 24.11 KG/M2

## 2022-08-22 DIAGNOSIS — R07.89 CHEST WALL PAIN: Primary | ICD-10-CM

## 2022-08-22 LAB
ANION GAP SERPL CALCULATED.3IONS-SCNC: 13 MMOL/L (ref 3–16)
BASOPHILS ABSOLUTE: 0 K/UL (ref 0–0.2)
BASOPHILS RELATIVE PERCENT: 0.7 %
BILIRUBIN URINE: NEGATIVE
BLOOD, URINE: NEGATIVE
BUN BLDV-MCNC: 15 MG/DL (ref 7–20)
CALCIUM SERPL-MCNC: 9.5 MG/DL (ref 8.3–10.6)
CHLORIDE BLD-SCNC: 102 MMOL/L (ref 99–110)
CLARITY: CLEAR
CO2: 23 MMOL/L (ref 21–32)
COLOR: YELLOW
CREAT SERPL-MCNC: 0.6 MG/DL (ref 0.6–1.1)
EKG ATRIAL RATE: 93 BPM
EKG DIAGNOSIS: NORMAL
EKG P AXIS: 77 DEGREES
EKG P-R INTERVAL: 156 MS
EKG Q-T INTERVAL: 372 MS
EKG QRS DURATION: 78 MS
EKG QTC CALCULATION (BAZETT): 462 MS
EKG R AXIS: 79 DEGREES
EKG T AXIS: 71 DEGREES
EKG VENTRICULAR RATE: 93 BPM
EOSINOPHILS ABSOLUTE: 0.1 K/UL (ref 0–0.6)
EOSINOPHILS RELATIVE PERCENT: 2.4 %
GFR AFRICAN AMERICAN: >60
GFR NON-AFRICAN AMERICAN: >60
GLUCOSE BLD-MCNC: 80 MG/DL (ref 70–99)
GLUCOSE URINE: NEGATIVE MG/DL
HCG(URINE) PREGNANCY TEST: NEGATIVE
HCT VFR BLD CALC: 42.7 % (ref 36–48)
HEMOGLOBIN: 14.1 G/DL (ref 12–16)
KETONES, URINE: NEGATIVE MG/DL
LACTIC ACID: 1.2 MMOL/L (ref 0.4–2)
LEUKOCYTE ESTERASE, URINE: NEGATIVE
LYMPHOCYTES ABSOLUTE: 1.2 K/UL (ref 1–5.1)
LYMPHOCYTES RELATIVE PERCENT: 30.5 %
MAGNESIUM: 2.2 MG/DL (ref 1.8–2.4)
MCH RBC QN AUTO: 28.9 PG (ref 26–34)
MCHC RBC AUTO-ENTMCNC: 33 G/DL (ref 31–36)
MCV RBC AUTO: 87.4 FL (ref 80–100)
MICROSCOPIC EXAMINATION: NORMAL
MONOCYTES ABSOLUTE: 0.3 K/UL (ref 0–1.3)
MONOCYTES RELATIVE PERCENT: 8.1 %
NEUTROPHILS ABSOLUTE: 2.3 K/UL (ref 1.7–7.7)
NEUTROPHILS RELATIVE PERCENT: 58.3 %
NITRITE, URINE: NEGATIVE
PDW BLD-RTO: 12.8 % (ref 12.4–15.4)
PH UA: 7 (ref 5–8)
PLATELET # BLD: 200 K/UL (ref 135–450)
PMV BLD AUTO: 8.8 FL (ref 5–10.5)
POTASSIUM SERPL-SCNC: 3.9 MMOL/L (ref 3.5–5.1)
PROTEIN UA: NEGATIVE MG/DL
RBC # BLD: 4.88 M/UL (ref 4–5.2)
SODIUM BLD-SCNC: 138 MMOL/L (ref 136–145)
SPECIFIC GRAVITY UA: 1.01 (ref 1–1.03)
TROPONIN: <0.01 NG/ML
URINE TYPE: NORMAL
UROBILINOGEN, URINE: 0.2 E.U./DL
WBC # BLD: 3.9 K/UL (ref 4–11)

## 2022-08-22 PROCEDURE — 80048 BASIC METABOLIC PNL TOTAL CA: CPT

## 2022-08-22 PROCEDURE — 85025 COMPLETE CBC W/AUTO DIFF WBC: CPT

## 2022-08-22 PROCEDURE — 84484 ASSAY OF TROPONIN QUANT: CPT

## 2022-08-22 PROCEDURE — 36415 COLL VENOUS BLD VENIPUNCTURE: CPT

## 2022-08-22 PROCEDURE — 83605 ASSAY OF LACTIC ACID: CPT

## 2022-08-22 PROCEDURE — 83735 ASSAY OF MAGNESIUM: CPT

## 2022-08-22 PROCEDURE — 71046 X-RAY EXAM CHEST 2 VIEWS: CPT

## 2022-08-22 PROCEDURE — 99285 EMERGENCY DEPT VISIT HI MDM: CPT

## 2022-08-22 PROCEDURE — 96374 THER/PROPH/DIAG INJ IV PUSH: CPT

## 2022-08-22 PROCEDURE — 6360000002 HC RX W HCPCS: Performed by: PHYSICIAN ASSISTANT

## 2022-08-22 PROCEDURE — 84703 CHORIONIC GONADOTROPIN ASSAY: CPT

## 2022-08-22 PROCEDURE — 93005 ELECTROCARDIOGRAM TRACING: CPT | Performed by: PHYSICIAN ASSISTANT

## 2022-08-22 PROCEDURE — 81003 URINALYSIS AUTO W/O SCOPE: CPT

## 2022-08-22 RX ORDER — KETOROLAC TROMETHAMINE 30 MG/ML
15 INJECTION, SOLUTION INTRAMUSCULAR; INTRAVENOUS ONCE
Status: COMPLETED | OUTPATIENT
Start: 2022-08-22 | End: 2022-08-22

## 2022-08-22 RX ORDER — MORPHINE SULFATE 2 MG/ML
2 INJECTION, SOLUTION INTRAMUSCULAR; INTRAVENOUS ONCE
Status: DISCONTINUED | OUTPATIENT
Start: 2022-08-22 | End: 2022-08-22

## 2022-08-22 RX ADMIN — KETOROLAC TROMETHAMINE 15 MG: 30 INJECTION, SOLUTION INTRAMUSCULAR at 21:01

## 2022-08-22 ASSESSMENT — HEART SCORE: ECG: 0

## 2022-08-22 ASSESSMENT — PAIN DESCRIPTION - FREQUENCY: FREQUENCY: CONTINUOUS

## 2022-08-22 ASSESSMENT — ENCOUNTER SYMPTOMS
EYES NEGATIVE: 1
DIARRHEA: 0
PHOTOPHOBIA: 0
COUGH: 0
CONSTIPATION: 0
NAUSEA: 0
SHORTNESS OF BREATH: 0
ABDOMINAL PAIN: 0
VOMITING: 0
BACK PAIN: 0

## 2022-08-22 ASSESSMENT — PAIN - FUNCTIONAL ASSESSMENT: PAIN_FUNCTIONAL_ASSESSMENT: 0-10

## 2022-08-22 ASSESSMENT — PAIN DESCRIPTION - ORIENTATION: ORIENTATION: MID

## 2022-08-22 ASSESSMENT — PAIN DESCRIPTION - PAIN TYPE: TYPE: ACUTE PAIN

## 2022-08-22 ASSESSMENT — PAIN DESCRIPTION - LOCATION: LOCATION: CHEST

## 2022-08-22 ASSESSMENT — PAIN SCALES - GENERAL: PAINLEVEL_OUTOF10: 8

## 2022-08-22 ASSESSMENT — PAIN DESCRIPTION - DESCRIPTORS: DESCRIPTORS: PRESSURE;SHARP;THROBBING

## 2022-08-22 NOTE — ED PROVIDER NOTES
[address] [facility] [address] ENCOUNTER          PHYSICIAN ASSISTANT NOTE       Date of evaluation: 8/22/2022    Chief Complaint     Chest Pain    History of Present Illness     Tim Markham is a 33 y.o. adult who presents to the emergency department with a very complex medical history as indicated below who presents emergency department with a chief complaint of chest pain. The patient states that over the past several days approximately 3 to 5 days she has had midsternal chest pain which radiates towards the left chest wall. She states that her discomfort comes and goes but gets worse with deep inhalation. She states that she has not been taking anything for her symptoms. She was actually here on Saturday for a multitude of complaints including headache, neck pain, left-sided weakness and some slurred speech. The patient states that she has been in a lupus flare for couple months now where she has increased neurological symptoms and seizures which she states she has been evaluated for in the past that may be stress-induced or psychogenic related. She states that she does not take any medications for her seizure disorder. She states that she had a bad flare in 2017 where this happened. On Saturday, the patient had an MRI of her brain and C-spine which were relatively unremarkable with no signs of any acute infarcts or cord compression. Otherwise the patient denies fevers or chills. She is vaccinated for COVID-19 and has been isolated secondary to her lupus. She denies shortness of breath. She denies any recent travel, surgeries and is not taking any estrogens. She tells me that she had been having this chest pain on Saturday but it was not her main concern and therefore she was not evaluated for this when she was in our emergency department. Review of Systems     Review of Systems   Constitutional:  Negative for chills, diaphoresis and fever. HENT: Negative. Eyes: Negative. Negative for photophobia and visual disturbance. Respiratory:  Negative for cough and shortness of breath. Cardiovascular:  Positive for chest pain. Negative for palpitations and leg swelling. Gastrointestinal:  Negative for abdominal pain, constipation, diarrhea, nausea and vomiting. Genitourinary:  Negative for difficulty urinating, dysuria, frequency, urgency and vaginal bleeding. Musculoskeletal: Negative. Negative for back pain, neck pain and neck stiffness. Skin: Negative. Negative for rash. Neurological:  Positive for seizures. Negative for dizziness, light-headedness and headaches. Hematological:  Negative for adenopathy. Psychiatric/Behavioral: Negative. All other systems reviewed and are negative. Past Medical, Surgical, Family, and Social History     Cindi Feng has a past medical history of Anxiety, Asthma, Bulimia, Depression, Environmental allergies, Fibromyalgia, Fibromyalgia syndrome, LEONEL (generalized anxiety disorder), Gestational hypertension, antepartum, High risk medication use, Lupus ([facility] [address] Other specified hypothyroidism, and Seizures ([facility] [address] Cindi Feng has a past surgical history that includes Tonsillectomy (2013); lymphadenectomy (2012); Carpal tunnel release (Bilateral, 2020); Bladder surgery (2017); Wrist surgery (Right, 2/26/2021); and Arm Surgery (Right, 7/16/2021). [facility]'s family history includes Anxiety Disorder in [facility]'s sister; Cancer in [facility]'s paternal grandfather and paternal grandmother; Depression in [facility]'s father and sister; Glaucoma in [facility]'s maternal grandmother; Heart Attack in [facility] maternal grandfather; Heart Disease in [facility] maternal grandfather; Hypertension in [facility]'s mother; Migraines in [facility]'s sister; Sleep Apnea in [facility]'s father; Thyroid Disease in [facility]'s mother. Cindi Feng reports that Cindi Feng quit smoking about 5 years ago. Milka Mcbride's smoking use included cigarettes. Conner Cantrell started smoking about 7 years ago. Conner Cantrell smoked an average of .2 packs per day. Conner Cantrell has quit using smokeless tobacco. Conner Cantrell reports that Conner Cantrell does not currently use drugs. Conner Cantrell reports that Conner Cantrell does not drink alcohol. Medications     Previous Medications    ALBUTEROL SULFATE (PROAIR HFA IN)    Inhale 1 puff into the lungs every 4 hours as needed    CLOTRIMAZOLE-BETAMETHASONE (LOTRISONE) 1-0.05 % CREAM    Apply topically 2 times daily as needed (rash) Apply topically 2 times daily. DULOXETINE (CYMBALTA) 60 MG EXTENDED RELEASE CAPSULE    TAKE 1 CAPSULE BY MOUTH EVERY DAY    ERENUMAB-AOOE (AIMOVIG) 140 MG/ML SOAJ    Inject once monthly    HYDROXYZINE (ATARAX) 50 MG TABLET    Take 50 mg by mouth 3 times daily as needed for Anxiety    LANSOPRAZOLE (PREVACID PO)    Take by mouth    LEVOTHYROXINE (SYNTHROID) 50 MCG TABLET    One tab daily    METHYLPHENIDATE (METADATE ER) 20 MG EXTENDED RELEASE TABLET    Take 20 mg by mouth every morning. MOMETASONE (ELOCON) 0.1 % CREAM    Apply topically daily. PREGABALIN (LYRICA) 75 MG CAPSULE    Take 1 capsule by mouth 2 times daily for 30 days. RIMEGEPANT SULFATE (NURTEC) 75 MG TBDP    Take 1 tablet at the onset of the severe headache. Not to exceed 1 tablet in 48 hours. RISPERIDONE (RISPERDAL) 0.25 MG TABLET    Take 0.25 mg by mouth 2 times daily    TRAZODONE (DESYREL) 50 MG TABLET    TAKE 1 TABLET BY MOUTH EVERY DAY AT NIGHT       Allergies     Conner Cantrell is allergic to latex, adhesive tape, pcn [penicillins], bee pollen, glucosamine, nickel, and other. Physical Exam     INITIAL VITALS: BP: 117/83, Temp: 98.2 °F (36.8 °C), Heart Rate: 88, Resp: 18, SpO2: 100 %  Physical Exam  Vitals and nursing note reviewed. Constitutional:       General: Conner Cantrell is not in acute distress. Appearance: Normal appearance.  Conner Cantrell is well-developed and normal weight. Kulwinder Augustin is not ill-appearing or diaphoretic. HENT:      Head: Normocephalic and atraumatic. Nose: Nose normal.      Mouth/Throat:      Mouth: Mucous membranes are moist.      Pharynx: Oropharynx is clear. No oropharyngeal exudate. Eyes:      General:         Right eye: No discharge. Left eye: No discharge. Extraocular Movements: Extraocular movements intact. Conjunctiva/sclera: Conjunctivae normal.      Pupils: Pupils are equal, round, and reactive to light. Cardiovascular:      Rate and Rhythm: Normal rate and regular rhythm. Heart sounds: Normal heart sounds. No murmur heard. Pulmonary:      Effort: Pulmonary effort is normal.      Breath sounds: Normal breath sounds. No wheezing or rales. Comments: Left-sided chest wall tenderness to palpation. Chest:      Chest wall: Tenderness present. Abdominal:      General: Abdomen is flat. Bowel sounds are normal. There is no distension. Palpations: Abdomen is soft. Tenderness: no abdominal tenderness There is no right CVA tenderness, left CVA tenderness or guarding. Musculoskeletal:         General: No swelling or tenderness. Normal range of motion. Cervical back: Normal range of motion and neck supple. Lymphadenopathy:      Cervical: No cervical adenopathy. Skin:     General: Skin is warm and dry. Coloration: Skin is not pale. Findings: No rash. Neurological:      General: No focal deficit present. Mental Status: Kulwinder Augustin is alert and oriented to person, place, and time. Cranial Nerves: No cranial nerve deficit. Sensory: No sensory deficit. Motor: No weakness.       Gait: Gait normal.      Deep Tendon Reflexes: Reflexes normal.   Psychiatric:         Mood and Affect: Mood normal.         Behavior: Behavior normal.       Diagnostic Results     EKG   Interpreted in conjunction with emergency department physician Parth Oliva MD  Rhythm: normal sinus   Rate: normal  Axis: normal  : none  Conduction: normal  ST Segments: normal  T Waves: non specific changes  Q Waves:none  Clinical Impression: non-specific EKG  Comparison: No recent comparison.     RADIOLOGY:  XR CHEST (2 VW)   Final Result      No acute pulmonary pathology or change from the prior study                    LABS:   Results for orders placed or performed during the hospital encounter of 08/22/22   CBC with Auto Differential   Result Value Ref Range    WBC 3.9 (L) 4.0 - 11.0 K/uL    RBC 4.88 4.00 - 5.20 M/uL    Hemoglobin 14.1 12.0 - 16.0 g/dL    Hematocrit 42.7 36.0 - 48.0 %    MCV 87.4 80.0 - 100.0 fL    MCH 28.9 26.0 - 34.0 pg    MCHC 33.0 31.0 - 36.0 g/dL    RDW 12.8 12.4 - 15.4 %    Platelets 914 192 - 134 K/uL    MPV 8.8 5.0 - 10.5 fL    Neutrophils % 58.3 %    Lymphocytes % 30.5 %    Monocytes % 8.1 %    Eosinophils % 2.4 %    Basophils % 0.7 %    Neutrophils Absolute 2.3 1.7 - 7.7 K/uL    Lymphocytes Absolute 1.2 1.0 - 5.1 K/uL    Monocytes Absolute 0.3 0.0 - 1.3 K/uL    Eosinophils Absolute 0.1 0.0 - 0.6 K/uL    Basophils Absolute 0.0 0.0 - 0.2 K/uL   BMP   Result Value Ref Range    Sodium 138 136 - 145 mmol/L    Potassium 3.9 3.5 - 5.1 mmol/L    Chloride 102 99 - 110 mmol/L    CO2 23 21 - 32 mmol/L    Anion Gap 13 3 - 16    Glucose 80 70 - 99 mg/dL    BUN 15 7 - 20 mg/dL    Creatinine 0.6 0.6 - 1.1 mg/dL    GFR Non-African American >60 >60    GFR African American >60 >60    Calcium 9.5 8.3 - 10.6 mg/dL   Urinalysis   Result Value Ref Range    Color, UA Yellow Straw/Yellow    Clarity, UA Clear Clear    Glucose, Ur Negative Negative mg/dL    Bilirubin Urine Negative Negative    Ketones, Urine Negative Negative mg/dL    Specific Gravity, UA 1.010 1.005 - 1.030    Blood, Urine Negative Negative    pH, UA 7.0 5.0 - 8.0    Protein, UA Negative Negative mg/dL    Urobilinogen, Urine 0.2 <2.0 E.U./dL    Nitrite, Urine Negative Negative    Leukocyte Esterase, Urine Negative Negative several months. BMP, magnesium, lactate, troponin, UA all within normal limits. Urine pregnancy is negative. Chest x-ray shows no acute cardiopulmonary abnormality. No signs of any pneumonia, pulmonary edema or pneumothorax. Patient's heart score is 0. I have low suspicion for pulmonary embolism causing this patient's symptoms given the chronicity of the symptoms as well as patient is PERC negative. There is no evidence of any tachycardia, tachypnea or new oxygen requirement which would be suspicious for acute pulmonary embolism. I have low suspicion for ACS in this patient given the reassuring EKG without any signs of ischemia or infarction and troponins less than 0.01 in the setting of patient's young age without any cardiac history. No suspicion for neurologic seizure given normal lactate in the setting of patient's history of present illness. EEG in 2017 showed no acute findings. The patient was updated on her results. I feel that given her chest wall tenderness this may be musculoskeletal related. This may also be related to the patient's fibromyalgia or generalized anxiety disorder. I do not suspect ACS, pulmonary edema, acute CHF exacerbation, pulmonary embolism given lack of risk factors for such. My attending and I discussed with her options for her lupus flare which may be causing some of her symptoms. The patient states that steroids do not typically help her symptoms. At this point in time we discussed that we have very low suspicion for acute pathology causing her symptoms. The patient did seem rather frustrated in that we were not able to tell her why she was having the symptoms that she was having. I encouraged her to follow-up with her rheumatologist who follows her lupus. The patient was ultimately agreeable and desired discharge at this time with no other needs. The patient was p.o. tolerant, ambulatory at the time of discharge.     This patient was also evaluated by the attending physician. All care plans were discussed and agreed upon. Clinical Impression     1.  Chest wall pain      Disposition     DISPOSITION Decision To Discharge 08/22/2022 09:05:25 PM     Mariama Francis PA-C  08/23/22 [id number]

## 2022-08-23 NOTE — DISCHARGE INSTRUCTIONS
You are seen in the emergency department for chest pain. Your work-up today was very reassuring. As I am not sure what exactly is causing your discomfort I think we can safely rule out anything emergently going on. I would follow-up with your rheumatologist regarding your lupus flare. Return to the emergency department with any other concerns you may have.

## 2022-08-30 RX ORDER — DULOXETIN HYDROCHLORIDE 60 MG/1
CAPSULE, DELAYED RELEASE ORAL
Qty: 90 CAPSULE | Refills: 1 | Status: SHIPPED | OUTPATIENT
Start: 2022-08-30

## 2022-09-07 ENCOUNTER — OFFICE VISIT (OUTPATIENT)
Dept: RHEUMATOLOGY | Age: 32
End: 2022-09-07
Payer: MEDICARE

## 2022-09-07 ENCOUNTER — NURSE ONLY (OUTPATIENT)
Dept: INFUSION THERAPY | Age: 32
End: 2022-09-07
Payer: MEDICARE

## 2022-09-07 VITALS — WEIGHT: 145 LBS | BODY MASS INDEX: 23.4 KG/M2

## 2022-09-07 VITALS
WEIGHT: 145 LBS | DIASTOLIC BLOOD PRESSURE: 70 MMHG | BODY MASS INDEX: 23.4 KG/M2 | RESPIRATION RATE: 16 BRPM | HEART RATE: 76 BPM | SYSTOLIC BLOOD PRESSURE: 112 MMHG | TEMPERATURE: 98.4 F

## 2022-09-07 DIAGNOSIS — M79.7 FIBROMYALGIA: Primary | ICD-10-CM

## 2022-09-07 DIAGNOSIS — M32.9 SYSTEMIC LUPUS ERYTHEMATOSUS, UNSPECIFIED SLE TYPE, UNSPECIFIED ORGAN INVOLVEMENT STATUS (HCC): ICD-10-CM

## 2022-09-07 DIAGNOSIS — Z79.899 HIGH RISK MEDICATION USE: ICD-10-CM

## 2022-09-07 DIAGNOSIS — M32.9 SYSTEMIC LUPUS ERYTHEMATOSUS, UNSPECIFIED SLE TYPE, UNSPECIFIED ORGAN INVOLVEMENT STATUS (HCC): Primary | ICD-10-CM

## 2022-09-07 LAB
ALT SERPL-CCNC: 9 U/L (ref 10–40)
AST SERPL-CCNC: 17 U/L (ref 15–37)
CREATININE URINE: 264.4 MG/DL (ref 28–259)
PROTEIN PROTEIN: 21 MG/DL
PROTEIN/CREAT RATIO: 0.1 MG/DL

## 2022-09-07 PROCEDURE — 96413 CHEMO IV INFUSION 1 HR: CPT | Performed by: INTERNAL MEDICINE

## 2022-09-07 PROCEDURE — 99214 OFFICE O/P EST MOD 30 MIN: CPT | Performed by: INTERNAL MEDICINE

## 2022-09-07 PROCEDURE — 36415 COLL VENOUS BLD VENIPUNCTURE: CPT | Performed by: INTERNAL MEDICINE

## 2022-09-07 RX ORDER — SODIUM CHLORIDE 0.9 % (FLUSH) 0.9 %
10 SYRINGE (ML) INJECTION PRN
Status: CANCELLED | OUTPATIENT
Start: 2022-10-05

## 2022-09-07 RX ORDER — DIPHENHYDRAMINE HYDROCHLORIDE 50 MG/ML
50 INJECTION INTRAMUSCULAR; INTRAVENOUS ONCE
Status: CANCELLED | OUTPATIENT
Start: 2022-10-05 | End: 2022-10-05

## 2022-09-07 RX ORDER — HEPARIN SODIUM (PORCINE) LOCK FLUSH IV SOLN 100 UNIT/ML 100 UNIT/ML
500 SOLUTION INTRAVENOUS PRN
Status: CANCELLED | OUTPATIENT
Start: 2022-10-05

## 2022-09-07 RX ORDER — ACETAMINOPHEN 325 MG/1
650 TABLET ORAL ONCE
Status: CANCELLED | OUTPATIENT
Start: 2022-10-05

## 2022-09-07 RX ORDER — SODIUM CHLORIDE 9 MG/ML
INJECTION, SOLUTION INTRAVENOUS CONTINUOUS
Status: CANCELLED | OUTPATIENT
Start: 2022-10-05

## 2022-09-07 RX ORDER — FAMOTIDINE 10 MG/ML
20 INJECTION, SOLUTION INTRAVENOUS ONCE
Status: CANCELLED | OUTPATIENT
Start: 2022-10-05 | End: 2022-10-05

## 2022-09-07 RX ORDER — SODIUM CHLORIDE 0.9 % (FLUSH) 0.9 %
5 SYRINGE (ML) INJECTION PRN
Status: CANCELLED | OUTPATIENT
Start: 2022-10-05

## 2022-09-07 RX ORDER — METHYLPREDNISOLONE SODIUM SUCCINATE 125 MG/2ML
40 INJECTION, POWDER, LYOPHILIZED, FOR SOLUTION INTRAMUSCULAR; INTRAVENOUS ONCE
Status: CANCELLED | OUTPATIENT
Start: 2022-10-05 | End: 2022-10-05

## 2022-09-07 RX ORDER — EPINEPHRINE 1 MG/ML
0.3 INJECTION, SOLUTION, CONCENTRATE INTRAVENOUS PRN
Status: CANCELLED | OUTPATIENT
Start: 2022-10-05

## 2022-09-07 NOTE — PROGRESS NOTES
9/7/2022  Pt here for Benlysta infusion. Follow up visit with Dr. Dominick Akins and labs today:  AST,  and ALT. Patient denies current infections, illnesses, or recent surgeries. No  Adverse effects from previous infusion. Recent visit to ER (see notes). Dr Dominick Akins aware of weight loss, no dose change today, therapeutic on current dose. Today's weight 145 lbs =65 kg   X 10 mg/kg = 650 mg, Rounded to 720 mg per MD.  Infusion  tolerated well. Vitals stable. Next visit scheduled  in 4 weeks.     IV Gauge: #22 Saf T Intima  IV Site: left antecubital  # of Attempts: 1  Infusion Start time: 0845 am  Infusion Stop time: 0950 am      See Therapy Plan, flowsheets and MAR  Administrations This Visit       belimumab (BENLYSTA) 656 mg in sodium chloride 0.9 % 250 mL infusion       Admin Date  09/07/2022  08:45 Action  New Bag Dose  720 mg Rate  250 mL/hr Route  IntraVENous Site   Administered By  Allyn Whaley RN    Ordering Provider: Augustine Siddiqui MD    Patient Supplied?: No    Comments: buy and bill  benlysta dose 720 mg per dr Pablo Mcbride

## 2022-09-07 NOTE — PROGRESS NOTES
pain-from fibromyalgia and degenerative disc disease in her cervical spine. Continues to have stiffness in her paraspinal areas, and intermittent paresthesias in upper extremities. She is followed by spine in [place] [facility]. Recent ED visits for atypical chest wall pain, resolved now. Continue Lyrica 75 mg twice daily. Is on 90 mg of Cymbalta-60 from the M30 from psychiatrist, advised patient to get Cymbalta prescription from mental health provider. Reiterated the importance of nonpharmacological therapy for noninflammatory pain including massage therapy, dry needling, aquatic therapy as tolerated. Sleep [id number] of sleep medicine for CPAP titration. 2.  SLE-in clinical remission, on Benlysta IV monthly. Lupus activity markers are stable. 3.  Raynaud phenomena--stable without tissue loss, on amlodipine 2.5 mg a day    Call with any flares, follow-up in 3 months. Patient indicates understanding and agrees with the management plan. I reviewed patient's history, referral documents and electronic medical records. Outside rheumatology records are scanned and reviewed as well as information from care everywhere is reviewed including rheumatology note, labs. #######################################################################    Subjective-  She continues to have chronic pain in her neck, paraspinal area, trapezius, at times notices paresthesias in upper extremities. Recent ED visit because of chest wall pain that has resolved now. She continues to have chronic generalized pain from fibromyalgia, Lyrica has helped some. She also states that her anxiety is not under control, that is making things worse. She is currently seeing a psychiatrist.  She denies any thoughts to harm herself or others. No side effects from Benlysta or Lyrica. No active symptoms of lupus. Fatigue is persisting. All other ROS are negative. PMH, PSH,Social history , Meds reviewed.     PHYSICAL EXAM: Vitals:    Wt (S) 145 lb (65.8 kg) Comment: weight loss  BMI 23.40 kg/m²   AA)x3 well nourished, and well groomed, normal judgement. MKS: All her fibromyalgia tender points are positive. Chronic generalized hyperesthesia and subjective discomfort throughout her body, upper, lower extremities, paraspinals muscles. No appreciable tender, swollen or inflamed joints in upper or lower extremities, full range of motion of peripheral joints. Skin: No rashes, no induration or skin thickening or nodules. HEENT: Normal lids, lacrimal glands and pupils. No oral or nasal ulcers. Salivary glands reveal no evidence of abnormality. External inspection of the ears and nose within normal limits. Neck: No adenopathy or thyroid enlargement.     DATA:   Lab Results   Component Value Date    WBC 3.9 (L) 08/22/2022    HGB 14.1 08/22/2022    HCT 42.7 08/22/2022    MCV 87.4 08/22/2022     08/22/2022         Chemistry        Component Value Date/Time     08/22/2022 1902    K 3.9 08/22/2022 1902    K 3.7 08/20/2022 1834     08/22/2022 1902    CO2 23 08/22/2022 1902    BUN 15 08/22/2022 1902    CREATININE 0.6 08/22/2022 1902        Component Value Date/Time    CALCIUM 9.5 08/22/2022 1902    ALKPHOS 70 05/10/2022 1146    AST 18 06/15/2022 0842    ALT 15 06/15/2022 0842    BILITOT 0.3 05/10/2022 1146          No results found for: [facility]  Lab Results   Component Value Date    CRP <3.0 08/20/2022     Lab Results   Component Value Date    IRIS POSITIVE (A) 01/22/2021    SEDRATE 3 08/20/2022     Lab Results   Component Value Date/Time    COLORU Yellow 08/22/2022 07:02 PM    NITRU Negative 08/22/2022 07:02 PM    GLUCOSEU Negative 08/22/2022 07:02 PM    GLUCOSEU NEGATIVE 08/31/2011 07:56 PM    KETUA Negative 08/22/2022 07:02 PM    UROBILINOGEN 0.2 08/22/2022 07:02 PM    BILIRUBINUR Negative 08/22/2022 07:02 PM    BILIRUBINUR NEGATIVE 08/31/2011 07:56 PM         Total time 31 minutes that includes the following-  Preparing to see the patient such as reviewing patients records, pre-charting, preparing the visit on the same day, performing a medically appropriate history and physical examination, counseling and educating patient about diagnosis, management plan, ordering appropriate testings, prescriptions, communicating findings to other care providers, and documenting clinical information in electronic medical record. A/P- See above.

## 2022-09-15 ENCOUNTER — TELEPHONE (OUTPATIENT)
Dept: INFUSION THERAPY | Age: 32
End: 2022-09-15

## 2022-09-15 NOTE — TELEPHONE ENCOUNTER
ELIGIBLE SINCE: 10/1/22    PRIOR AUTH REQUIRED:    NO    PER MEDICARE GUIDELINES    DEDUCTIBLE $233.00 (WHICH MUST BE MET BEFORE THE INSURANCE WILL COVER ANYTHING):    ALL COSTS  % PATIENT RESPONSIBILITY UNTIL DEDUCTIBLE MET. INSURANCE WILL PAY 80 % ONCE DEDUCTIBLE IS MET. PT WILL BE RESPONSIBLE FOR 20%.        INFUSION YES        INJECTION YES        VALID AND BILLABLE ON CLAIM:  YES  BUY AND BILL:  YES

## 2022-09-15 NOTE — TELEPHONE ENCOUNTER
This message came from the patient via my chart:   \"Ayaka Singh go ahead and move forward with canceling my [employer] Medicare advantage plan for October. If you are able to go ahead and start a prior off for the Benlysta using just my Medicare insurance with Medicaid still a secondary, that would be great. It should be on file from when I first started coming but the Medicare number is [id number]. The change wont be active until October 1 but the policy has been active since January 1, 2020 so hopefully there isnt any delay. \"    Based on the above message a new VOB and Prior authorization is needed.       VOB: Yes     PRIOR AUTHORIZATION REQUEST:       DRUG NAME Benlysta ()  DOSE:  10 mg/kg IV FREQUENCY EVERY 4 WEEKS                          INFUSION STATUS: CONTINUATION of infusion     CURRENT PA EXPIRES ON 2/18/2023 (from previous insurance)    ADMINISTRATION CODES: .08699 (1st hour infusion)    DX CODE M32.9    INFUSION DATE : NEXT INFUSION DATE 10/5/2022         BENEFITS: prefer From medical benefits buy and bill (currently from buy & bill on previous insurance)

## 2022-10-05 ENCOUNTER — NURSE ONLY (OUTPATIENT)
Dept: INFUSION THERAPY | Age: 32
End: 2022-10-05
Payer: MEDICARE

## 2022-10-05 VITALS
TEMPERATURE: 98.7 F | RESPIRATION RATE: 16 BRPM | BODY MASS INDEX: 23.24 KG/M2 | DIASTOLIC BLOOD PRESSURE: 74 MMHG | SYSTOLIC BLOOD PRESSURE: 120 MMHG | HEART RATE: 105 BPM | WEIGHT: 144 LBS

## 2022-10-05 DIAGNOSIS — E03.9 ACQUIRED HYPOTHYROIDISM: Primary | ICD-10-CM

## 2022-10-05 DIAGNOSIS — M32.9 SYSTEMIC LUPUS ERYTHEMATOSUS, UNSPECIFIED SLE TYPE, UNSPECIFIED ORGAN INVOLVEMENT STATUS (HCC): ICD-10-CM

## 2022-10-05 LAB
T3 FREE: 2.6 PG/ML (ref 2.3–4.2)
T4 FREE: 1 NG/DL (ref 0.9–1.8)
TSH REFLEX: 3.4 UIU/ML (ref 0.27–4.2)

## 2022-10-05 PROCEDURE — 96413 CHEMO IV INFUSION 1 HR: CPT | Performed by: INTERNAL MEDICINE

## 2022-10-05 PROCEDURE — 36415 COLL VENOUS BLD VENIPUNCTURE: CPT | Performed by: INTERNAL MEDICINE

## 2022-10-05 RX ORDER — SODIUM CHLORIDE 0.9 % (FLUSH) 0.9 %
5 SYRINGE (ML) INJECTION PRN
OUTPATIENT
Start: 2022-11-02

## 2022-10-05 RX ORDER — HEPARIN SODIUM (PORCINE) LOCK FLUSH IV SOLN 100 UNIT/ML 100 UNIT/ML
500 SOLUTION INTRAVENOUS PRN
OUTPATIENT
Start: 2022-11-02

## 2022-10-05 RX ORDER — AMLODIPINE BESYLATE 2.5 MG/1
2.5 TABLET ORAL DAILY
Qty: 90 TABLET | Refills: 1 | Status: SHIPPED | OUTPATIENT
Start: 2022-10-05

## 2022-10-05 RX ORDER — AMLODIPINE BESYLATE 2.5 MG/1
2.5 TABLET ORAL DAILY
COMMUNITY
End: 2022-10-07 | Stop reason: ALTCHOICE

## 2022-10-05 RX ORDER — DIPHENHYDRAMINE HYDROCHLORIDE 50 MG/ML
50 INJECTION INTRAMUSCULAR; INTRAVENOUS ONCE
OUTPATIENT
Start: 2022-11-02 | End: 2022-11-02

## 2022-10-05 RX ORDER — EPINEPHRINE 1 MG/ML
0.3 INJECTION, SOLUTION, CONCENTRATE INTRAVENOUS PRN
OUTPATIENT
Start: 2022-11-02

## 2022-10-05 RX ORDER — ACETAMINOPHEN 325 MG/1
650 TABLET ORAL ONCE
OUTPATIENT
Start: 2022-11-02

## 2022-10-05 RX ORDER — FAMOTIDINE 10 MG/ML
20 INJECTION, SOLUTION INTRAVENOUS ONCE
OUTPATIENT
Start: 2022-11-02 | End: 2022-11-02

## 2022-10-05 RX ORDER — METHYLPREDNISOLONE SODIUM SUCCINATE 125 MG/2ML
40 INJECTION, POWDER, LYOPHILIZED, FOR SOLUTION INTRAMUSCULAR; INTRAVENOUS ONCE
OUTPATIENT
Start: 2022-11-02 | End: 2022-11-02

## 2022-10-05 RX ORDER — SODIUM CHLORIDE 9 MG/ML
INJECTION, SOLUTION INTRAVENOUS CONTINUOUS
OUTPATIENT
Start: 2022-11-02

## 2022-10-05 RX ORDER — SODIUM CHLORIDE 0.9 % (FLUSH) 0.9 %
10 SYRINGE (ML) INJECTION PRN
OUTPATIENT
Start: 2022-11-02

## 2022-10-05 NOTE — PROGRESS NOTES
10/5/2022 Per pt request, labs drawn for Dr Desire Chaudhari. Pt here for Benlysta infusion. Patient denies current infections, illnesses, or recent surgeries. No  Adverse effects from previous infusion. Dr Adam Perez aware of weight loss, no dose change today, therapeutic on current dose. Today's weight 144 lbs =65 kg   X 10 mg/kg = 650 mg, Rounded to 720 mg per MD.  Infusion  tolerated well. Vitals stable. Next visit scheduled  in 4 weeks. Sent pt's refill request for amlodipine to Dr Adam Perez. IV Gauge: #22 Saf T intima  IV Site: left antecubital  # of Attempts: 1  Infusion Start time: 0945 am  Infusion Stop time: 1050 am  Medication buy and bill. See Therapy Plan, flowsheets and MAR  Administrations This Visit       belimumab (BENLYSTA) 656 mg in sodium chloride 0.9 % 250 mL infusion       Admin Date  10/05/2022  09:45 Action  New Bag Dose  720 mg Rate  250 mL/hr Route  IntraVENous Site   Administered By  Wan Washington RN    Ordering Provider: Gilmar Joe MD    Patient Supplied?: No    Comments: buy and bill.   benlysta 720 mg per dr Oliver [place]                      See Note from 9/15/2022 with information about VOB and  authorization for [facility] [place]

## 2022-10-07 ENCOUNTER — TELEMEDICINE (OUTPATIENT)
Dept: ENDOCRINOLOGY | Age: 32
End: 2022-10-07
Payer: MEDICARE

## 2022-10-07 DIAGNOSIS — E03.9 ACQUIRED HYPOTHYROIDISM: Primary | ICD-10-CM

## 2022-10-07 PROCEDURE — G8427 DOCREV CUR MEDS BY ELIG CLIN: HCPCS | Performed by: INTERNAL MEDICINE

## 2022-10-07 PROCEDURE — 99214 OFFICE O/P EST MOD 30 MIN: CPT | Performed by: INTERNAL MEDICINE

## 2022-10-07 RX ORDER — LEVOTHYROXINE SODIUM 0.05 MG/1
TABLET ORAL
Qty: 90 TABLET | Refills: 3 | Status: SHIPPED | OUTPATIENT
Start: 2022-10-07

## 2022-10-07 NOTE — PROGRESS NOTES
2.53 FT4 0.9 FT3 3.4    Level improved, she had ran out of medication  Discussed with patient, given symptoms, wants to restart medication  Advised start back on previous dose but assess for hyperthyroidism. Advised f/u in 3-4 months    10/22 TSH 3.4  FT4 1.0 FT3 2.6  On 50mcg      SH: Lives with wife  She has one child    Past Medical History:   Diagnosis Date    Anxiety     Asthma     Bulimia     Depression     Environmental allergies     Fibromyalgia 3/17/2021    Fibromyalgia syndrome     LEONEL (generalized anxiety disorder) 3/17/2021    Gestational hypertension, antepartum     High risk medication use 10/5/2021    Lupus ([facility] [address]     just diagnosised with lupus last week    Other specified hypothyroidism 3/17/2021    Seizures ([facility] [address]      Past Surgical History:   Procedure Laterality Date    ARM SURGERY Right 7/16/2021    RIGHT ANTERIOR NERVE TRANSPOSITION AT THE ELBOW performed by Sheridan Rivera MD at [address]  2017    [address] Bilateral 2020    LYMPHADENECTOMY  2012    TONSILLECTOMY  2013    WRIST SURGERY Right 2/26/2021    RIGHT WRIST DEQUERVAINS RELEASE performed by Sheridan Rivera MD at [address]     Current Outpatient Medications   Medication Sig Dispense Refill    amLODIPine (NORVASC) 2.5 MG tablet Take 1 tablet by mouth daily 90 tablet 1    DULoxetine (CYMBALTA) 60 MG extended release capsule TAKE 1 CAPSULE BY MOUTH EVERY DAY (Patient taking differently: 90 mg Pt taking 90mg) 90 capsule 1    clotrimazole-betamethasone (LOTRISONE) 1-0.05 % cream Apply topically 2 times daily as needed (rash) Apply topically 2 times daily. 45 g 1    methylphenidate (METADATE ER) 20 MG extended release tablet Take 20 mg by mouth every morning. Erenumab-aooe (AIMOVIG) 140 MG/ML SOAJ Inject once monthly 140 mL 5    Rimegepant Sulfate (NURTEC) 75 MG TBDP Take 1 tablet at the onset of the severe headache. Not to exceed 1 tablet in 48 hours.  8 tablet 1    levothyroxine (SYNTHROID) 50 MCG tablet One tab daily 90 tablet 3    risperiDONE (RISPERDAL) 0.25 MG tablet Take 0.25 mg by mouth 2 times daily      Lansoprazole (PREVACID PO) Take by mouth      traZODone (DESYREL) 50 MG tablet TAKE 1 TABLET BY MOUTH EVERY DAY AT NIGHT 90 tablet 1    Albuterol Sulfate (PROAIR HFA IN) Inhale 1 puff into the lungs every 4 hours as needed      hydrOXYzine (ATARAX) 50 MG tablet Take 50 mg by mouth 3 times daily as needed for Anxiety      mometasone (ELOCON) 0.1 % cream Apply topically daily. 15 g 2    pregabalin (LYRICA) 75 MG capsule Take 1 capsule by mouth 2 times daily for 30 days. 60 capsule 2     No current facility-administered medications for this visit. Constitutional: Negative for weight loss and malaise/fatigue. Negative for fever and chills. Eyes: Negative for blurred vision. Negative for double vision, photophobia and pain. Respiratory: Negative for cough and sputum production. Cardiovascular: Negative for chest pain, palpitations and leg swelling. Gastrointestinal: Negative for nausea, vomiting and abdominal pain. Genitourinary: Negative for dysuria, urgency and frequency. Musculoskeletal: + for back pain. + joint pain  Skin: Negative for itching and rash. Neurological: + for dizziness. Negative for tingling, tremors, focal weakness and +headaches. Endo/Heme/Allergies: see HPI  Psychiatric/Behavioral: + for depression and - substance abuse. Objective: There were no vitals taken for this visit. Wt Readings from Last 3 Encounters:   10/05/22 144 lb (65.3 kg)   09/07/22 (S) 145 lb (65.8 kg)   09/07/22 145 lb (65.8 kg)         Constitutional: Well-developed, appears stated age, cooperative, in no acute distress  H/E/N/M/T:atraumatic, normocephalic, external ears, nose, lips normal without lesions  Eyes: Lids, lashes, conjunctivae and sclerae normal, No proptosis, no redness  Neck: supple, symmetrical, no swelling  Skin: No obvious rashes or lesions present.   Skin and hair texture normal  Psychiatric: Judgement and Insight:  judgement and insight appear normal  Neuro: Normal without focal findings, speech is normal normal, speech is spontaneous  Chest: No labored breathing, no chest deformity, no stridor  Musculoskeletal: No joint deformity, swelling      Lab Review  Lab Results   Component Value Date/Time    TSH 2.01 05/10/2022 11:46 AM     No results found for: FREET4      Assessment: 1. Hypothyroidism: TSH low with low FT4 suggestive of central hypothyroidism  MRI did not show any pituitary abnormality. On replacement  Ran out of medication. She had symptoms. Feels better on it. TFT normal  Will continue medication  2. Elevated prolactin: Repeat normal  3. SLE  4. Irregular Cycles: Testosterone normal, DHEA-S low, not suggestive of PCOS. Repeat prolactin normal. No improvement with levothyroxine. FSH, LH normal. Estradiol is low normal. Will get records of brain imaging. Recommend to f/u with Gyn    Plan: 1. Check TFT in a year  2.levothyroxine 50mcg after labs    F/u in a year

## 2022-11-02 ENCOUNTER — NURSE ONLY (OUTPATIENT)
Dept: INFUSION THERAPY | Age: 32
End: 2022-11-02
Payer: MEDICARE

## 2022-11-02 VITALS
BODY MASS INDEX: 24.05 KG/M2 | RESPIRATION RATE: 16 BRPM | HEART RATE: 107 BPM | SYSTOLIC BLOOD PRESSURE: 116 MMHG | WEIGHT: 149 LBS | TEMPERATURE: 98.9 F | DIASTOLIC BLOOD PRESSURE: 73 MMHG

## 2022-11-02 DIAGNOSIS — M32.9 SYSTEMIC LUPUS ERYTHEMATOSUS, UNSPECIFIED SLE TYPE, UNSPECIFIED ORGAN INVOLVEMENT STATUS (HCC): Primary | ICD-10-CM

## 2022-11-02 DIAGNOSIS — Z79.899 HIGH RISK MEDICATION USE: ICD-10-CM

## 2022-11-02 LAB
ALT SERPL-CCNC: 15 U/L (ref 10–40)
AST SERPL-CCNC: 38 U/L (ref 15–37)
BASOPHILS ABSOLUTE: 0 K/UL (ref 0–0.2)
BASOPHILS RELATIVE PERCENT: 0.5 %
C-REACTIVE PROTEIN: <3 MG/L (ref 0–5.1)
CREAT SERPL-MCNC: 0.8 MG/DL (ref 0.6–1.1)
CREATININE URINE: 252.8 MG/DL (ref 28–259)
EOSINOPHILS ABSOLUTE: 0.1 K/UL (ref 0–0.6)
EOSINOPHILS RELATIVE PERCENT: 3.3 %
GFR SERPL CREATININE-BSD FRML MDRD: >60 ML/MIN/{1.73_M2}
HCT VFR BLD CALC: 40.5 % (ref 36–48)
HEMOGLOBIN: 13.2 G/DL (ref 12–16)
LYMPHOCYTES ABSOLUTE: 1.2 K/UL (ref 1–5.1)
LYMPHOCYTES RELATIVE PERCENT: 30.8 %
MCH RBC QN AUTO: 28.7 PG (ref 26–34)
MCHC RBC AUTO-ENTMCNC: 32.6 G/DL (ref 31–36)
MCV RBC AUTO: 88 FL (ref 80–100)
MONOCYTES ABSOLUTE: 0.3 K/UL (ref 0–1.3)
MONOCYTES RELATIVE PERCENT: 8.1 %
NEUTROPHILS ABSOLUTE: 2.2 K/UL (ref 1.7–7.7)
NEUTROPHILS RELATIVE PERCENT: 57.3 %
PDW BLD-RTO: 13.4 % (ref 12.4–15.4)
PLATELET # BLD: 196 K/UL (ref 135–450)
PMV BLD AUTO: 9 FL (ref 5–10.5)
PROTEIN PROTEIN: 30 MG/DL
PROTEIN/CREAT RATIO: 0.1 MG/DL
RBC # BLD: 4.61 M/UL (ref 4–5.2)
SEDIMENTATION RATE, ERYTHROCYTE: 7 MM/HR (ref 0–20)
WBC # BLD: 3.8 K/UL (ref 4–11)

## 2022-11-02 PROCEDURE — 96413 CHEMO IV INFUSION 1 HR: CPT | Performed by: INTERNAL MEDICINE

## 2022-11-02 PROCEDURE — 36415 COLL VENOUS BLD VENIPUNCTURE: CPT | Performed by: INTERNAL MEDICINE

## 2022-11-02 RX ORDER — SODIUM CHLORIDE 0.9 % (FLUSH) 0.9 %
5 SYRINGE (ML) INJECTION PRN
OUTPATIENT
Start: 2022-11-30

## 2022-11-02 RX ORDER — METHYLPREDNISOLONE SODIUM SUCCINATE 125 MG/2ML
40 INJECTION, POWDER, LYOPHILIZED, FOR SOLUTION INTRAMUSCULAR; INTRAVENOUS ONCE
OUTPATIENT
Start: 2022-11-30 | End: 2022-11-30

## 2022-11-02 RX ORDER — DIPHENHYDRAMINE HYDROCHLORIDE 50 MG/ML
50 INJECTION INTRAMUSCULAR; INTRAVENOUS ONCE
OUTPATIENT
Start: 2022-11-30 | End: 2022-11-30

## 2022-11-02 RX ORDER — ACETAMINOPHEN 325 MG/1
650 TABLET ORAL ONCE
OUTPATIENT
Start: 2022-11-30

## 2022-11-02 RX ORDER — SODIUM CHLORIDE 0.9 % (FLUSH) 0.9 %
10 SYRINGE (ML) INJECTION PRN
OUTPATIENT
Start: 2022-11-30

## 2022-11-02 RX ORDER — HEPARIN SODIUM (PORCINE) LOCK FLUSH IV SOLN 100 UNIT/ML 100 UNIT/ML
500 SOLUTION INTRAVENOUS PRN
OUTPATIENT
Start: 2022-11-30

## 2022-11-02 RX ORDER — FAMOTIDINE 10 MG/ML
20 INJECTION, SOLUTION INTRAVENOUS ONCE
OUTPATIENT
Start: 2022-11-30 | End: 2022-11-30

## 2022-11-02 RX ORDER — SODIUM CHLORIDE 9 MG/ML
INJECTION, SOLUTION INTRAVENOUS CONTINUOUS
OUTPATIENT
Start: 2022-11-30

## 2022-11-02 RX ORDER — EPINEPHRINE 1 MG/ML
0.3 INJECTION, SOLUTION, CONCENTRATE INTRAVENOUS PRN
OUTPATIENT
Start: 2022-11-30

## 2022-11-02 NOTE — PATIENT INSTRUCTIONS
To avoid any disruptions in your treatment, we ask that you please notify us immediately if you have ANY insurance changes such as a new plan, new plan number, new group number, or if you might be losing coverage. If the infusion nurse does not receive your new insurance information, your infusion may be temporarily held until we can check your benefits and get an authorization from your new insurance. Please notify us BEFORE your next infusion, as it may take several weeks to check your benefits and for your new insurance to approve your continuing treatment. Please know that we want to help you in anyway we can, so notify us early! If your insurance coverage changes in ANY way you must do the follow:  Contact our office and speak with the Infusion Nurse. All calls will be returned within 48 hours, except on weekends. We will need a copy of both the front and back of your new card. Infusion appointments will be placed on HOLD until a new Verification of Benefits and/or Prior Authorization (if required) is acquired   A NEW VOB (verification of benefits) must be requested to verify coverage for the medication and infusion fees. If a Prior Authorization is needed, we will obtain this and provide information needed to your insurance. This process can take 3-4 weeks depending on insurance. Once the new VOB has been obtained the Infusion RN or a member of the Rheumatology staff will contact you to discuss changes in coverage. A new infusion schedule can be made at this time. Changes in insurance may disqualify you from receiving infusion coverage, however, we will do everything we can to get your treatment, as we know it is necessary for your disease and quality of living.       *Co-payment and deductible is due at time of Service. *As a reminder, anyone on Patient Copay Assistance (Commercial insurance ONLY. This does NOT pertain to Medicare or Medicaid).  You should have received information from the drug  regarding how the copay programs work. If you have not received the information, please let us know. You are responsible for sending in the Explanation of Benefits (EOBs) and [employer] for dates of service to your individual Savings Program and then following thru to ensure it was approved. If you are having difficulty understanding the program, please reach out immediately for assistance. If these are not sent in a timely manner, you will be responsible for the large portion that your insurance doesn't cover, which can be in the thousands! These charges can add up quickly and we don't want you to be stuck with large bills or your treatment be stopped for non-payment of the medication. *Reminder Appointments are schedule one appointment ahead if you come every 6-8 weeks, and 2 ahead if you come every 4 weeks. Please know that we cannot infuse if the physician is not present, so if we cannot accommodate your needs, please feel free to ask what other options are available. The nurse will do the best to accommodate your needs , there may be times that this is not always possible. Please be patient as we try to accommodate patients in a safe, and timely manner. If the physician is out, be aware that you may need to go to our [facility], located on campus of [facility], to have your treatment in a timely manner. If you have questions, please speak with a member of the Rheumatology staff. We are here to help and guide you through this infusion process and ensure that you get the treatment that you need. Thank you for letting us take care of you! Thank you for your cooperation and help! We are grateful to serve you!

## 2022-11-02 NOTE — PROGRESS NOTES
11/2/2022  Pt here for Benlysta infusion. Labs today: cbc, creatinine, crp, AST, ALT, and sed rate drawn. Patient denies current infections, illnesses, or recent surgeries. No  Adverse effects from previous infusion. Today's weight 149 lbs =67.6 kg   X 10 mg/kg = 670 mg, Rounded to 720 mg per MD.  Infusion  tolerated well. Vitals stable. Next visit scheduled  in 4 weeks. IV Gauge: #22 Saf T Intima  IV Site: left antecubital  # of Attempts: 1  Infusion Start time: 3120 am  Infusion Stop time: 0955 am  Medication buy and bill.          See Therapy Plan, flowsheets and MAR  Administrations This Visit       belimumab (BENLYSTA) 680 mg in sodium chloride 0.9 % 250 mL infusion       Admin Date  11/02/2022  08:55 Action  New Bag Dose  720 mg Rate  250 mL/hr Route  IntraVENous Site   Administered By  Zelia Sandifer, RN    Ordering Provider: Efrain Gardner MD    Patient Supplied?: No    Comments: mercedes and bill  benlysta 720 mg per dr Christiano Saenz

## 2022-11-05 DIAGNOSIS — M79.7 FIBROMYALGIA: ICD-10-CM

## 2022-11-07 RX ORDER — PREGABALIN 75 MG/1
75 CAPSULE ORAL 2 TIMES DAILY
Qty: 60 CAPSULE | Refills: 2 | Status: SHIPPED | OUTPATIENT
Start: 2022-11-07 | End: 2022-12-07

## 2022-11-18 ENCOUNTER — OFFICE VISIT (OUTPATIENT)
Dept: PULMONOLOGY | Age: 32
End: 2022-11-18
Payer: MEDICARE

## 2022-11-18 VITALS
DIASTOLIC BLOOD PRESSURE: 60 MMHG | WEIGHT: 150 LBS | HEART RATE: 99 BPM | SYSTOLIC BLOOD PRESSURE: 100 MMHG | BODY MASS INDEX: 24.11 KG/M2 | HEIGHT: 66 IN | OXYGEN SATURATION: 98 %

## 2022-11-18 DIAGNOSIS — G47.33 OSA (OBSTRUCTIVE SLEEP APNEA): Chronic | ICD-10-CM

## 2022-11-18 DIAGNOSIS — F41.1 GAD (GENERALIZED ANXIETY DISORDER): Chronic | ICD-10-CM

## 2022-11-18 DIAGNOSIS — F51.04 PSYCHOPHYSIOLOGICAL INSOMNIA: Chronic | ICD-10-CM

## 2022-11-18 PROCEDURE — G8484 FLU IMMUNIZE NO ADMIN: HCPCS | Performed by: INTERNAL MEDICINE

## 2022-11-18 PROCEDURE — 1036F TOBACCO NON-USER: CPT | Performed by: INTERNAL MEDICINE

## 2022-11-18 PROCEDURE — 99214 OFFICE O/P EST MOD 30 MIN: CPT | Performed by: INTERNAL MEDICINE

## 2022-11-18 PROCEDURE — G8420 CALC BMI NORM PARAMETERS: HCPCS | Performed by: INTERNAL MEDICINE

## 2022-11-18 PROCEDURE — G8427 DOCREV CUR MEDS BY ELIG CLIN: HCPCS | Performed by: INTERNAL MEDICINE

## 2022-11-18 ASSESSMENT — SLEEP AND FATIGUE QUESTIONNAIRES
HOW LIKELY ARE YOU TO NOD OFF OR FALL ASLEEP WHILE SITTING AND TALKING TO SOMEONE: 0
HOW LIKELY ARE YOU TO NOD OFF OR FALL ASLEEP WHILE SITTING INACTIVE IN A PUBLIC PLACE: 1
HOW LIKELY ARE YOU TO NOD OFF OR FALL ASLEEP WHILE LYING DOWN TO REST IN THE AFTERNOON WHEN CIRCUMSTANCES PERMIT: 3
ESS TOTAL SCORE: 13
HOW LIKELY ARE YOU TO NOD OFF OR FALL ASLEEP WHILE WATCHING TV: 2
HOW LIKELY ARE YOU TO NOD OFF OR FALL ASLEEP IN A CAR, WHILE STOPPED FOR A FEW MINUTES IN TRAFFIC: 0
HOW LIKELY ARE YOU TO NOD OFF OR FALL ASLEEP WHEN YOU ARE A PASSENGER IN A CAR FOR AN HOUR WITHOUT A BREAK: 3
HOW LIKELY ARE YOU TO NOD OFF OR FALL ASLEEP WHILE SITTING QUIETLY AFTER LUNCH WITHOUT ALCOHOL: 1
HOW LIKELY ARE YOU TO NOD OFF OR FALL ASLEEP WHILE SITTING AND READING: 3

## 2022-11-18 NOTE — LETTER
[facility] Sleep Medicine  [address] [facility]  [address] [address]  Phone: [phone]  Fax: [phone]    Apolinar Sanchez MD    November 18, 2022     Juanito Rust MD  [address] [place] [address]    Patient: Kevin Marino   MR Number: [id number]   YOB: 1990   Date of Visit: 11/18/2022       Dear Juanito Rust:    Thank you for referring Kevin Marino to me for evaluation/treatment. Below are the relevant portions of my assessment and plan of care. 1. LEOLA (obstructive sleep apnea)  Assessment & Plan:  Chronic-Stable: Reviewed and analyzed results of physiologic download from patient's machine and reviewed with patient. Supplies and parts as needed for [employer]. These are medically necessary. Limit caffeine use after 3pm. Based on the analyzed data will change following settings: Pmin-11. We discussed going on a daily nasal steroid today to help with congestion and also alternate with a full facemask. 2. LEONEL (generalized anxiety disorder)  Assessment & Plan:  Chronic- Stable. Discussed the importance of treating sleep apnea as part of the management of this disorder. Cont any meds per PCP and other physicians. 3. Psychophysiological insomnia  Assessment & Plan:  Chronic- Stable. Discussed the importance of treating sleep apnea as part of the management of this disorder. Cont any meds per PCP and other physicians. We will have the patient continue trazodone 50 mg per her psychiatrist that she is to be helping her sleep. Reviewed, analyzed, and documented physiologic data from patient's PAP machine. This information was analyzed to assess complexity and medical decision making in regards to further testing and management. Diagnoses of LEOLA (obstructive sleep apnea), LEONEL (generalized anxiety disorder), and Psychophysiological insomnia were pertinent to this visit.   The chronic medical conditions listed are directly related to the primary diagnosis listed above. The management of the primary diagnosis affects the secondary diagnosis and vice versa. If you have questions, please do not hesitate to call me. I look forward to following Fatmata Gomez along with you.     Sincerely,      Apolinar Sanchez MD

## 2022-11-18 NOTE — PROGRESS NOTES
Merrily Backbone MD Johanna Collet [facility]  [id number] [place] [address], [address] [place]- [phone]   [facility] [address] Mary Auguste. [address] [place] [address]. Dilshad Doan [address] [phone]     [address] [id number] [phone]      Assessment/Plan:      1. LEOLA (obstructive sleep apnea)  Assessment & Plan:  Chronic-Stable: Reviewed and analyzed results of physiologic download from patient's machine and reviewed with patient. Supplies and parts as needed for [employer]. These are medically necessary. Limit caffeine use after 3pm. Based on the analyzed data will change following settings: Pmin-11. We discussed going on a daily nasal steroid today to help with congestion and also alternate with a full facemask. 2. LEONEL (generalized anxiety disorder)  Assessment & Plan:  Chronic- Stable. Discussed the importance of treating sleep apnea as part of the management of this disorder. Cont any meds per PCP and other physicians. 3. Psychophysiological insomnia  Assessment & Plan:  Chronic- Stable. Discussed the importance of treating sleep apnea as part of the management of this disorder. Cont any meds per PCP and other physicians. We will have the patient continue trazodone 50 mg per her psychiatrist that she is to be helping her sleep. Reviewed, analyzed, and documented physiologic data from patient's PAP machine. This information was analyzed to assess complexity and medical decision making in regards to further testing and management. Diagnoses of LEOLA (obstructive sleep apnea), LEONEL (generalized anxiety disorder), and Psychophysiological insomnia were pertinent to this visit. The chronic medical conditions listed are directly related to the primary diagnosis listed above. The management of the primary diagnosis affects the secondary diagnosis and vice versa. Subjective:   Subjective   Patient ID: Ayde Khoury is a 33 y.o. adult. Chief Complaint   Patient presents with    Sleep Apnea       HPI:  Machine Modem/Download Info:  Compliance (hours/night): 4.8 hrs/night  % of nights >= 4 hrs: 59 %  Download AHI (/hour): 0.8 /HR  Average CPAP Pressure : 10.7 cmH2O   APAP - Settings  Pressure Min: 10 cmH2O  Pressure Max: 20 cmH2O                 Comfort Settings  Flex/EPR (0-3): 3       Patient has been struggling lately with using her machine due to congestion. Her partner passed away few months ago and so often she is crying gets quite congested nighttime for she falls asleep and is unable to use her mask. She was placed on extended release Ritalin by her psychiatrist which is really helped her daytime functioning and energy. She still using trazodone 50 mg each night to help her to sleep. There are times she wakes up with the mask off her face not knowing that she took it off.     DME Company - [employer]    [place] Sleepiness Score: 13    Social History     Socioeconomic History    Marital status: Single     Spouse name: Not on file    Number of children: Not on file    Years of education: Not on file    Highest education level: Not on file   Occupational History    Not on file   Tobacco Use    Smoking status: Former     Packs/day: 0.20     Types: Cigarettes     Start date:      Quit date: 2017     Years since quittin.8    Smokeless tobacco: Former   Vaping Use    Vaping Use: Never used   Substance and Sexual Activity    Alcohol use: No    Drug use: Not Currently    Sexual activity: Not on file   Other Topics Concern    Not on file   Social History Narrative    Not on file     Social Determinants of Health     Financial Resource Strain: Low Risk     Difficulty of Paying Living Expenses: Not hard at all   Food Insecurity: No Food Insecurity    Worried About [employer] in the Last Year: Never true    [facility] [employer] in the Last Year: Never true Transportation Needs: Not on file   Physical Activity: Not on file   Stress: Not on file   Social Connections: Not on file   Intimate Partner Violence: Not on file   Housing Stability: Not on file       Current Outpatient Medications   Medication Instructions    Albuterol Sulfate (PROAIR HFA IN) 1 puff, Inhalation, EVERY 4 HOURS PRN    amLODIPine (NORVASC) 2.5 mg, Oral, DAILY    clotrimazole-betamethasone (LOTRISONE) 1-0.05 % cream Topical, 2 TIMES DAILY PRN, Apply topically 2 times daily. DULoxetine (CYMBALTA) 60 MG extended release capsule TAKE 1 CAPSULE BY MOUTH EVERY DAY    Erenumab-aooe (AIMOVIG) 140 MG/ML SOAJ Inject once monthly    hydrOXYzine HCl (ATARAX) 50 mg, Oral, 3 TIMES DAILY PRN    Lansoprazole (PREVACID PO) Oral    levothyroxine (SYNTHROID) 50 MCG tablet One tab daily    methylphenidate (METADATE ER) 20 mg, Oral, EVERY MORNING    mometasone (ELOCON) 0.1 % cream Apply topically daily. pregabalin (LYRICA) 75 mg, Oral, 2 TIMES DAILY    Rimegepant Sulfate (NURTEC) 75 MG TBDP Take 1 tablet at the onset of the severe headache. Not to exceed 1 tablet in 48 hours. risperiDONE (RISPERDAL) 0.25 mg, Oral, 2 TIMES DAILY    traZODone (DESYREL) 50 MG tablet TAKE 1 TABLET BY MOUTH EVERY DAY AT NIGHT          Vitals:  Weight BMI   Wt Readings from Last 3 Encounters:   11/18/22 150 lb (68 kg)   11/02/22 149 lb (67.6 kg)   10/05/22 144 lb (65.3 kg)    Body mass index is 24.21 kg/m².      BP HR SaO2   BP Readings from Last 3 Encounters:   11/18/22 100/60   11/02/22 116/73   10/05/22 120/74    Pulse Readings from Last 3 Encounters:   11/18/22 99   11/02/22 (!) 107   10/05/22 (!) 105    SpO2 Readings from Last 3 Encounters:   11/18/22 98%   08/22/22 100%   08/20/22 100%        Electronically signed by Song Jorgensen MD on 11/18/2022 at 10:09 AM

## 2022-11-18 NOTE — ASSESSMENT & PLAN NOTE
Chronic- Stable. Discussed the importance of treating sleep apnea as part of the management of this disorder. Cont any meds per PCP and other physicians. We will have the patient continue trazodone 50 mg per her psychiatrist that she is to be helping her sleep.

## 2022-11-18 NOTE — ASSESSMENT & PLAN NOTE
Chronic-Stable: Reviewed and analyzed results of physiologic download from patient's machine and reviewed with patient. Supplies and parts as needed for [employer]. These are medically necessary. Limit caffeine use after 3pm. Based on the analyzed data will change following settings: Pmin-11. We discussed going on a daily nasal steroid today to help with congestion and also alternate with a full facemask.

## 2022-11-18 NOTE — PROGRESS NOTES
Brina Acevedo         : 1990    Diagnosis: [x] LEOLA (G47.33) [] CSA (G47.31) [] Apnea (G47.30)   Length of Need: [x] 18 Months [] 99 Months [] Other:    Machine (TON!): [] Respironics Dream Station      Auto [] ResMed AirSense     Auto [] Other:     []  CPAP () [] Bilevel ()   Mode: [] Auto [] Spontaneous    Mode: [] Auto [] Spontaneous              Comfort Settings:        Humidifier: [] Heated ()        [x] Water chamber replacement ()/ 1 per 6 months        Mask:   [] Nasal () /1 per 3 months [x] Full Face () /1 per 3 months   [] Patient choice -Size and fit mask [x] Patient Choice - Size and fit mask   [] Dispense:  [] Dispense:    [] Headgear () / 1 per 3 months [x] Headgear () / 1 per 3 months   [] Replacement Nasal Cushion ()/2 per month [x] Interface Replacement ()/1 per month   [] Replacement Nasal Pillows ()/2 per month         Tubing: [x] Heated ()/1 per 3 months    [] Standard ()/1 per 3 months [] Other:           Filters: [x] Non-disposable ()/1 per 6 months     [x] Ultra-Fine, Disposable ()/2 per month        Miscellaneous: [] Chin Strap ()/ 1 per 6 months [] O2 bleed-in:       LPM   [] Oximetry on CPAP/Bilevel []  Other:    [x] Modem: ()         Start Order Date: 22    MEDICAL JUSTIFICATION:  I, the undersigned, certify that the above prescribed supplies are medically necessary for this patients wellbeing. In my opinion, the supplies are both reasonable and necessary in reference to accepted standards of medicalpractice in treatment of this patients condition.     Frank Santos MD      NPI: [id number]       Order Signed Date: 22    Electronically signed by Frank Santos MD on 2022 at 9:59 AM    Brina Acevedo  1990  [address] [id number]  [phone] (home)   [phone] (mobile)      Insurance Info (confirm with patient if correct):  Payer/Plan Subscr  Sex Relation Sub.  Ins. ID Effective Group Num

## 2022-11-30 ENCOUNTER — NURSE ONLY (OUTPATIENT)
Dept: INFUSION THERAPY | Age: 32
End: 2022-11-30
Payer: MEDICARE

## 2022-11-30 VITALS
HEART RATE: 100 BPM | TEMPERATURE: 98.7 F | DIASTOLIC BLOOD PRESSURE: 70 MMHG | WEIGHT: 149 LBS | RESPIRATION RATE: 16 BRPM | BODY MASS INDEX: 24.05 KG/M2 | SYSTOLIC BLOOD PRESSURE: 118 MMHG

## 2022-11-30 DIAGNOSIS — M32.9 SYSTEMIC LUPUS ERYTHEMATOSUS, UNSPECIFIED SLE TYPE, UNSPECIFIED ORGAN INVOLVEMENT STATUS (HCC): Primary | ICD-10-CM

## 2022-11-30 PROCEDURE — 96413 CHEMO IV INFUSION 1 HR: CPT | Performed by: INTERNAL MEDICINE

## 2022-11-30 RX ORDER — SODIUM CHLORIDE 0.9 % (FLUSH) 0.9 %
10 SYRINGE (ML) INJECTION PRN
OUTPATIENT
Start: 2022-12-28

## 2022-11-30 RX ORDER — EPINEPHRINE 1 MG/ML
0.3 INJECTION, SOLUTION, CONCENTRATE INTRAVENOUS PRN
OUTPATIENT
Start: 2022-12-28

## 2022-11-30 RX ORDER — DIPHENHYDRAMINE HYDROCHLORIDE 50 MG/ML
50 INJECTION INTRAMUSCULAR; INTRAVENOUS ONCE
OUTPATIENT
Start: 2022-12-28 | End: 2022-12-28

## 2022-11-30 RX ORDER — HEPARIN SODIUM (PORCINE) LOCK FLUSH IV SOLN 100 UNIT/ML 100 UNIT/ML
500 SOLUTION INTRAVENOUS PRN
OUTPATIENT
Start: 2022-12-28

## 2022-11-30 RX ORDER — SODIUM CHLORIDE 9 MG/ML
INJECTION, SOLUTION INTRAVENOUS CONTINUOUS
OUTPATIENT
Start: 2022-12-28

## 2022-11-30 RX ORDER — SODIUM CHLORIDE 0.9 % (FLUSH) 0.9 %
5 SYRINGE (ML) INJECTION PRN
OUTPATIENT
Start: 2022-12-28

## 2022-11-30 RX ORDER — ACETAMINOPHEN 325 MG/1
650 TABLET ORAL ONCE
OUTPATIENT
Start: 2022-12-28

## 2022-11-30 RX ORDER — FAMOTIDINE 10 MG/ML
20 INJECTION, SOLUTION INTRAVENOUS ONCE
OUTPATIENT
Start: 2022-12-28 | End: 2022-12-28

## 2022-11-30 RX ORDER — METHYLPREDNISOLONE SODIUM SUCCINATE 125 MG/2ML
40 INJECTION, POWDER, LYOPHILIZED, FOR SOLUTION INTRAMUSCULAR; INTRAVENOUS ONCE
OUTPATIENT
Start: 2022-12-28 | End: 2022-12-28

## 2022-12-16 ENCOUNTER — PATIENT MESSAGE (OUTPATIENT)
Dept: RHEUMATOLOGY | Age: 32
End: 2022-12-16

## 2022-12-19 ENCOUNTER — TELEPHONE (OUTPATIENT)
Dept: INFUSION THERAPY | Age: 32
End: 2022-12-19

## 2022-12-19 RX ORDER — AMLODIPINE BESYLATE 5 MG/1
5 TABLET ORAL DAILY
Qty: 30 TABLET | Refills: 1 | Status: SHIPPED | OUTPATIENT
Start: 2022-12-19

## 2022-12-19 NOTE — TELEPHONE ENCOUNTER
VOB: Yes for 2023  CO PAY ASSISTANCE: no      DRUG NAME Benlysta ()     CURRENT WEIGHT   IN KILOGRAMS 67.     DOSE:  10 mg/kg IV     FREQUENCY EVERY 4 WEEKS                          INFUSION STATUS: CONTINUATION of infusion     DX CODE M32.9    NEXT INFUSION DATE : NEXT INFUSION DATE 1/30/2023

## 2022-12-19 NOTE — TELEPHONE ENCOUNTER
From: Rob Bowels  To: Dr. Génesis Manning  Sent: 12/16/2022 11:00 AM EST  Subject: Amlodipine dose    Forgot To message you but I have doubled my amlodipine just let things be working a bit better.  Are you able to send that scripture of my [facility]?  Thanks,  Von Mendoza

## 2022-12-27 ENCOUNTER — OFFICE VISIT (OUTPATIENT)
Dept: NEUROLOGY | Age: 32
End: 2022-12-27
Payer: MEDICARE

## 2022-12-27 VITALS
BODY MASS INDEX: 23.95 KG/M2 | HEIGHT: 66 IN | WEIGHT: 149 LBS | SYSTOLIC BLOOD PRESSURE: 124 MMHG | DIASTOLIC BLOOD PRESSURE: 79 MMHG | HEART RATE: 91 BPM

## 2022-12-27 DIAGNOSIS — G43.119 INTRACTABLE MIGRAINE WITH AURA WITHOUT STATUS MIGRAINOSUS: Primary | ICD-10-CM

## 2022-12-27 PROCEDURE — 99213 OFFICE O/P EST LOW 20 MIN: CPT | Performed by: PSYCHIATRY & NEUROLOGY

## 2022-12-27 PROCEDURE — 1036F TOBACCO NON-USER: CPT | Performed by: PSYCHIATRY & NEUROLOGY

## 2022-12-27 PROCEDURE — G8484 FLU IMMUNIZE NO ADMIN: HCPCS | Performed by: PSYCHIATRY & NEUROLOGY

## 2022-12-27 PROCEDURE — G8427 DOCREV CUR MEDS BY ELIG CLIN: HCPCS | Performed by: PSYCHIATRY & NEUROLOGY

## 2022-12-27 PROCEDURE — G8420 CALC BMI NORM PARAMETERS: HCPCS | Performed by: PSYCHIATRY & NEUROLOGY

## 2022-12-27 RX ORDER — DUTASTERIDE 0.5 MG/1
CAPSULE, LIQUID FILLED ORAL
COMMUNITY
Start: 2022-12-15

## 2022-12-27 RX ORDER — RIMEGEPANT SULFATE 75 MG/75MG
TABLET, ORALLY DISINTEGRATING ORAL
Qty: 8 TABLET | Refills: 1 | Status: SHIPPED | OUTPATIENT
Start: 2022-12-27

## 2022-12-27 RX ORDER — ERENUMAB-AOOE 140 MG/ML
INJECTION, SOLUTION SUBCUTANEOUS
Qty: 140 ML | Refills: 5 | Status: SHIPPED | OUTPATIENT
Start: 2022-12-27

## 2022-12-27 NOTE — PROGRESS NOTES
LEONEL (generalized anxiety disorder) 3/17/2021    Gestational hypertension, antepartum     High risk medication use 10/5/2021    Lupus ([facility] [address]     just diagnosised with lupus last week    LEOLA (obstructive sleep apnea) 2021    Other specified hypothyroidism 3/17/2021    Seizures ([facility] [address]      Family History   Problem Relation Age of Onset    Hypertension Mother     Thyroid Disease Mother     Depression Father     Sleep Apnea Father     Depression Sister     Anxiety Disorder Sister     Migraines Sister     Glaucoma Maternal Grandmother     Heart Attack Maternal Grandfather     Heart Disease Maternal Grandfather     Cancer Paternal Grandmother     Cancer Paternal Grandfather      Social History     Socioeconomic History    Marital status: Single     Spouse name: None    Number of children: None    Years of education: None    Highest education level: None   Tobacco Use    Smoking status: Former     Packs/day: 0.20     Types: Cigarettes     Start date:      Quit date:      Years since quittin.9    Smokeless tobacco: Former   Vaping Use    Vaping Use: Never used   Substance and Sexual Activity    Alcohol use: No    Drug use: Not Currently     Social Determinants of Health     Financial Resource Strain: Low Risk     Difficulty of Paying Living Expenses: Not hard at all   Food Insecurity: No Food Insecurity    Worried About Running Out of Food in the Last Year: Never true    [address] in the Last Year: Never true        Objective:  Exam:  /79   Pulse 91   Ht 5' 6\" (1.676 m)   Wt 149 lb (67.6 kg)   BMI 24.05 kg/m²   This is a well-nourished patient in no acute distress  Patient is awake, alert and oriented x3. Speech is normal.  Pupils are equal round reacting to light. Extraocular movements intact. Face symmetrical. Tongue midline. Motor exam shows normal symmetrical strength. Deep tendon reflexes normal. Plantar reflexes downgoing.   Sensory exam normal. Coordination normal. Gait normal. No carotid bruit. No neck stiffness. Data :  LABS:  General Labs:    CBC:   Lab Results   Component Value Date/Time    WBC 3.8 11/02/2022 08:52 AM    RBC 4.61 11/02/2022 08:52 AM    HGB 13.2 11/02/2022 08:52 AM    HCT 40.5 11/02/2022 08:52 AM    MCV 88.0 11/02/2022 08:52 AM    MCH 28.7 11/02/2022 08:52 AM    MCHC 32.6 11/02/2022 08:52 AM    RDW 13.4 11/02/2022 08:52 AM     11/02/2022 08:52 AM    MPV 9.0 11/02/2022 08:52 AM     BMP:    Lab Results   Component Value Date/Time     08/22/2022 07:02 PM    K 3.9 08/22/2022 07:02 PM    K 3.7 08/20/2022 06:34 PM     08/22/2022 07:02 PM    CO2 23 08/22/2022 07:02 PM    BUN 15 08/22/2022 07:02 PM    LABALBU 4.7 05/10/2022 11:46 AM    CREATININE 0.8 11/02/2022 08:52 AM    CALCIUM 9.5 08/22/2022 07:02 PM    GFRAA >60 08/22/2022 07:02 PM    GFRAA >60 08/31/2011 08:10 PM    LABGLOM >60 11/02/2022 08:52 AM    GLUCOSE 80 08/22/2022 07:02 PM       Impression :  Migraine headaches, not well controlled  Fibromyalgia  Patient states that she has been tried on several preventative medications for migraines in the past including topiramate amitriptyline and Botox injections without much improvement  Obstructive sleep apnea    Plan :  Discussed with patient  She is still trying to get records from her previous neurologist.  Continue Aimovig 140 mg. She will take it subcutaneously every 30 days. Continue Nurtec #8 to be taken on a as needed basis. Continue CPAP for sleep apnea  Return in 6 months        Please note a portion of  this chart was generated using dragon dictation software. Although every effort was made to ensure the accuracy of this automated transcription, some errors in transcription may have occurred.

## 2022-12-28 ENCOUNTER — NURSE ONLY (OUTPATIENT)
Dept: INFUSION THERAPY | Age: 32
End: 2022-12-28
Payer: MEDICARE

## 2022-12-28 VITALS
DIASTOLIC BLOOD PRESSURE: 70 MMHG | WEIGHT: 158 LBS | HEART RATE: 90 BPM | RESPIRATION RATE: 16 BRPM | BODY MASS INDEX: 25.5 KG/M2 | SYSTOLIC BLOOD PRESSURE: 116 MMHG | TEMPERATURE: 98.8 F

## 2022-12-28 DIAGNOSIS — M32.9 SYSTEMIC LUPUS ERYTHEMATOSUS, UNSPECIFIED SLE TYPE, UNSPECIFIED ORGAN INVOLVEMENT STATUS (HCC): Primary | ICD-10-CM

## 2022-12-28 PROCEDURE — 96413 CHEMO IV INFUSION 1 HR: CPT | Performed by: INTERNAL MEDICINE

## 2022-12-28 RX ORDER — SODIUM CHLORIDE 0.9 % (FLUSH) 0.9 %
10 SYRINGE (ML) INJECTION PRN
OUTPATIENT
Start: 2023-01-25

## 2022-12-28 RX ORDER — SODIUM CHLORIDE 9 MG/ML
INJECTION, SOLUTION INTRAVENOUS CONTINUOUS
OUTPATIENT
Start: 2023-01-25

## 2022-12-28 RX ORDER — METHYLPREDNISOLONE SODIUM SUCCINATE 125 MG/2ML
40 INJECTION, POWDER, LYOPHILIZED, FOR SOLUTION INTRAMUSCULAR; INTRAVENOUS ONCE
OUTPATIENT
Start: 2023-01-25 | End: 2023-01-25

## 2022-12-28 RX ORDER — ACETAMINOPHEN 325 MG/1
650 TABLET ORAL ONCE
OUTPATIENT
Start: 2023-01-25

## 2022-12-28 RX ORDER — EPINEPHRINE 1 MG/ML
0.3 INJECTION, SOLUTION, CONCENTRATE INTRAVENOUS PRN
OUTPATIENT
Start: 2023-01-25

## 2022-12-28 RX ORDER — FAMOTIDINE 10 MG/ML
20 INJECTION, SOLUTION INTRAVENOUS ONCE
OUTPATIENT
Start: 2023-01-25 | End: 2023-01-25

## 2022-12-28 RX ORDER — DIPHENHYDRAMINE HYDROCHLORIDE 50 MG/ML
50 INJECTION INTRAMUSCULAR; INTRAVENOUS ONCE
OUTPATIENT
Start: 2023-01-25 | End: 2023-01-25

## 2022-12-28 RX ORDER — SODIUM CHLORIDE 0.9 % (FLUSH) 0.9 %
5 SYRINGE (ML) INJECTION PRN
OUTPATIENT
Start: 2023-01-25

## 2022-12-28 RX ORDER — HEPARIN SODIUM (PORCINE) LOCK FLUSH IV SOLN 100 UNIT/ML 100 UNIT/ML
500 SOLUTION INTRAVENOUS PRN
OUTPATIENT
Start: 2023-01-25

## 2022-12-28 NOTE — PROGRESS NOTES
12/28/2022  Pt here for Benlysta infusion. Dose per Dr Mala Hankins orders. Patient denies current infections, illnesses, or recent surgeries. No  Adverse effects from previous infusion. Infusion  tolerated well. Vitals stable. Next visit scheduled. Prefers 930 am appointment time.    IV Gauge: #22 Saf T Intima  IV Site: left antecubital  # of Attempts: 1  Infusion  Start: 1045 am  Infusion  Stop: 1145 am       See Therapy plan, flowsheets, and MAR  Wt Readings from Last 3 Encounters:   12/28/22 158 lb (71.7 kg)   12/27/22 149 lb (67.6 kg)   11/30/22 149 lb (67.6 kg)      Administrations This Visit       belimumab (BENLYSTA) 720 mg in sodium chloride 0.9 % 250 mL infusion       Admin Date  12/28/2022  10:45 Action  New Bag Dose  720 mg Rate  250 mL/hr Route  IntraVENous Site   Administered By  Shawna Hazel RN    Ordering Provider: Darby Pate MD    Patient Supplied?: No    Comments: buy and bill  benlysta 720 mg per dr Acacia Moreno orders

## 2023-01-03 NOTE — TELEPHONE ENCOUNTER
ELIGIBLE SINCE: 1/1/2020    PRIOR AUTH REQUIRED:    NO  PHONE NUMBER:      DEDUCTIBLE $233.00 (WHICH MUST BE MET BEFORE THE INSURANCE WILL COVER ANYTHING):  INSURANCE WILL  % ONCE DEDUCTIBLE IS MET. TOTAL DEDUCTIBLE MET SO FAR: $0.00     ALL COSTS  % PATIENT RESPONSIBILITY UNTIL DEDUCTIBLE MET. TOTAL OUT OF POCKET MAX: $233.00   TOTAL OUT OF POCKET MET: $233.00 (INSURANCE WILL COVER 100% ONCE OOP MET)     INFUSION YES            PATIENT ASSISTANCE (INFUSION ONLY):  NO     VALID AND BILLABLE ON CLAIM:  YES  BUY AND BILL:  YES    DRUG COSTS FOR THE PATIENT $233.00  ADMIN COSTS FOR THE PATIENT $233.00  AFTER THIS HAS BEEN MET INSURANCE WILL % PER MEDICARE GUIDELINE    DRUG BENLYSTA J CODE TO BE BILLED     CURRENT WEIGHT IN KILOGRAMS 71.7. UNITS TO BE BILLED OUT 68. ADMIN CODE TO BILLED  ALONG WITH START AND STOP TIMES 48891.     Please make sure the medication is not given early (not even ONE day early)

## 2023-01-10 RX ORDER — AMLODIPINE BESYLATE 5 MG/1
TABLET ORAL
Qty: 30 TABLET | Refills: 1 | Status: SHIPPED | OUTPATIENT
Start: 2023-01-10

## 2023-01-11 ENCOUNTER — TELEPHONE (OUTPATIENT)
Dept: NEUROLOGY | Age: 33
End: 2023-01-11

## 2023-01-20 DIAGNOSIS — G43.119 INTRACTABLE MIGRAINE WITH AURA WITHOUT STATUS MIGRAINOSUS: ICD-10-CM

## 2023-01-20 NOTE — TELEPHONE ENCOUNTER
PA was sent to pts medicaid. Resubmitted today to pts [employer].      Arleth Mcclain (Gonzalez: [id number])  Nurtec 75MG dispersible tablets  STATUS:PENDING

## 2023-01-20 NOTE — TELEPHONE ENCOUNTER
PA was submitted to pts medicaid. Resubmitted to pts [employer] today.      Lynnette Syedg (Gonzalez: [id number])  Aimovig 140MG/ML auto-injectors  STATUS:PENDING

## 2023-01-23 RX ORDER — ERENUMAB-AOOE 140 MG/ML
INJECTION, SOLUTION SUBCUTANEOUS
OUTPATIENT
Start: 2023-01-23

## 2023-01-23 NOTE — TELEPHONE ENCOUNTER
Rose Navarro (KeyElizabeth Flannery)  Nurtec 75MG dispersible tablets     Determination  Favorable  3 days ago  Message from Plan  Approved. This drug has been approved under the Member's Medicare Part D benefit. Approved quantity: 8 Tablet per 30 day(s). You may fill up to a 90 day supply except for those on Specialty Tier 5, which can be filled up to a 30 day supply. Please call the pharmacy to process the prescription claim.

## 2023-01-23 NOTE — TELEPHONE ENCOUNTER
Erenumab-aooe (AIMOVIG) 140 MG/ML SOAJ 140 mL 5 12/27/2022     Sig: Inject once monthly    Sent to pharmacy as: Aimovig 140 MG/ML Subcutaneous Solution Auto-injector Nicki Ryan)    E-Prescribing Status: Receipt confirmed by pharmacy (12/27/2022  2:05 PM EST)    [id number] [address], [address] [facility] [address] - [address] [phone]

## 2023-01-30 ENCOUNTER — NURSE ONLY (OUTPATIENT)
Dept: INFUSION THERAPY | Age: 33
End: 2023-01-30
Payer: MEDICARE

## 2023-01-30 VITALS
BODY MASS INDEX: 25.44 KG/M2 | SYSTOLIC BLOOD PRESSURE: 116 MMHG | HEART RATE: 90 BPM | DIASTOLIC BLOOD PRESSURE: 70 MMHG | TEMPERATURE: 98.2 F | RESPIRATION RATE: 16 BRPM | WEIGHT: 157.6 LBS

## 2023-01-30 DIAGNOSIS — M32.9 SYSTEMIC LUPUS ERYTHEMATOSUS, UNSPECIFIED SLE TYPE, UNSPECIFIED ORGAN INVOLVEMENT STATUS (HCC): Primary | ICD-10-CM

## 2023-01-30 DIAGNOSIS — Z79.899 HIGH RISK MEDICATION USE: ICD-10-CM

## 2023-01-30 LAB
ALT SERPL-CCNC: 9 U/L (ref 10–40)
AST SERPL-CCNC: 29 U/L (ref 15–37)
BASOPHILS ABSOLUTE: 0 K/UL (ref 0–0.2)
BASOPHILS RELATIVE PERCENT: 0.8 %
C-REACTIVE PROTEIN: <3 MG/L (ref 0–5.1)
CREAT SERPL-MCNC: 0.9 MG/DL (ref 0.6–1.1)
EOSINOPHILS ABSOLUTE: 0.2 K/UL (ref 0–0.6)
EOSINOPHILS RELATIVE PERCENT: 6.3 %
GFR SERPL CREATININE-BSD FRML MDRD: >60 ML/MIN/{1.73_M2}
HCT VFR BLD CALC: 37.9 % (ref 36–48)
HEMOGLOBIN: 12 G/DL (ref 12–16)
HEPATITIS B SURFACE ANTIGEN INTERPRETATION: NORMAL
HEPATITIS C ANTIBODY INTERPRETATION: NORMAL
LYMPHOCYTES ABSOLUTE: 1.2 K/UL (ref 1–5.1)
LYMPHOCYTES RELATIVE PERCENT: 41.8 %
MCH RBC QN AUTO: 26.2 PG (ref 26–34)
MCHC RBC AUTO-ENTMCNC: 31.7 G/DL (ref 31–36)
MCV RBC AUTO: 82.7 FL (ref 80–100)
MONOCYTES ABSOLUTE: 0.2 K/UL (ref 0–1.3)
MONOCYTES RELATIVE PERCENT: 7 %
NEUTROPHILS ABSOLUTE: 1.3 K/UL (ref 1.7–7.7)
NEUTROPHILS RELATIVE PERCENT: 44.1 %
PDW BLD-RTO: 14.4 % (ref 12.4–15.4)
PLATELET # BLD: 288 K/UL (ref 135–450)
PMV BLD AUTO: 9.2 FL (ref 5–10.5)
RBC # BLD: 4.59 M/UL (ref 4–5.2)
SEDIMENTATION RATE, ERYTHROCYTE: 10 MM/HR (ref 0–20)
WBC # BLD: 3 K/UL (ref 4–11)

## 2023-01-30 PROCEDURE — 96413 CHEMO IV INFUSION 1 HR: CPT | Performed by: INTERNAL MEDICINE

## 2023-01-30 PROCEDURE — 36415 COLL VENOUS BLD VENIPUNCTURE: CPT | Performed by: INTERNAL MEDICINE

## 2023-01-30 RX ORDER — METHYLPREDNISOLONE SODIUM SUCCINATE 125 MG/2ML
40 INJECTION, POWDER, LYOPHILIZED, FOR SOLUTION INTRAMUSCULAR; INTRAVENOUS ONCE
Status: CANCELLED | OUTPATIENT
Start: 2023-02-20 | End: 2023-02-20

## 2023-01-30 RX ORDER — DIPHENHYDRAMINE HYDROCHLORIDE 50 MG/ML
50 INJECTION INTRAMUSCULAR; INTRAVENOUS ONCE
Status: CANCELLED | OUTPATIENT
Start: 2023-02-20 | End: 2023-02-20

## 2023-01-30 RX ORDER — SODIUM CHLORIDE 0.9 % (FLUSH) 0.9 %
5 SYRINGE (ML) INJECTION PRN
Status: CANCELLED | OUTPATIENT
Start: 2023-02-20

## 2023-01-30 RX ORDER — SODIUM CHLORIDE 0.9 % (FLUSH) 0.9 %
10 SYRINGE (ML) INJECTION PRN
Status: CANCELLED | OUTPATIENT
Start: 2023-02-20

## 2023-01-30 RX ORDER — HEPARIN SODIUM (PORCINE) LOCK FLUSH IV SOLN 100 UNIT/ML 100 UNIT/ML
500 SOLUTION INTRAVENOUS PRN
Status: CANCELLED | OUTPATIENT
Start: 2023-02-20

## 2023-01-30 RX ORDER — ACETAMINOPHEN 325 MG/1
650 TABLET ORAL ONCE
Status: CANCELLED | OUTPATIENT
Start: 2023-02-20

## 2023-01-30 RX ORDER — FAMOTIDINE 10 MG/ML
20 INJECTION, SOLUTION INTRAVENOUS ONCE
Status: CANCELLED | OUTPATIENT
Start: 2023-02-20 | End: 2023-02-20

## 2023-01-30 RX ORDER — EPINEPHRINE 1 MG/ML
0.3 INJECTION, SOLUTION, CONCENTRATE INTRAVENOUS PRN
Status: CANCELLED | OUTPATIENT
Start: 2023-02-20

## 2023-01-30 RX ORDER — SODIUM CHLORIDE 9 MG/ML
INJECTION, SOLUTION INTRAVENOUS CONTINUOUS
Status: CANCELLED | OUTPATIENT
Start: 2023-02-20

## 2023-01-30 NOTE — PATIENT INSTRUCTIONS
[address] [place] [address] [facility], [address]   [address] [facility] [phone]

## 2023-01-30 NOTE — PROGRESS NOTES
1/30/2023  Arrived at 0924 am for 0930 am appointment. Pt here for Benlysta infusion. Dose per Dr Bobbi Hernandez orders. Patient denies current infections, illnesses, or recent surgeries. No  Adverse effects from previous infusion. Infusion  tolerated well. Vitals stable. Next infusion visit at [facility]  IV Gauge: #22 Saf T Intima  IV Site: left forearm (pt request)  # of Attempts: 1  Infusion  Start: 0935 am  Infusion  Stop: 1035 am       See Therapy plan, flowsheets, and MAR  Wt Readings from Last 3 Encounters:   01/30/23 157 lb 9.6 oz (71.5 kg)   12/28/22 158 lb (71.7 kg)   12/27/22 149 lb (67.6 kg)      Administrations This Visit       belimumab (BENLYSTA) 712 mg in sodium chloride 0.9 % 250 mL infusion       Admin Date  01/30/2023  09:35 Action  New Bag Dose  720 mg Rate  250 mL/hr Route  IntraVENous Site   Administered By  Bob Fernando RN    Ordering Provider: Asia Dong MD    Patient Supplied?: No    Comments: buy and bill benysta   720 mg per dr Teagan Fernandez.

## 2023-02-01 DIAGNOSIS — M32.9 SYSTEMIC LUPUS ERYTHEMATOSUS, UNSPECIFIED SLE TYPE, UNSPECIFIED ORGAN INVOLVEMENT STATUS (HCC): Primary | ICD-10-CM

## 2023-02-01 RX ORDER — SODIUM CHLORIDE 9 MG/ML
5-250 INJECTION, SOLUTION INTRAVENOUS PRN
OUTPATIENT
Start: 2023-02-27

## 2023-02-01 RX ORDER — ALBUTEROL SULFATE 90 UG/1
4 AEROSOL, METERED RESPIRATORY (INHALATION) PRN
OUTPATIENT
Start: 2023-02-27

## 2023-02-01 RX ORDER — HEPARIN SODIUM (PORCINE) LOCK FLUSH IV SOLN 100 UNIT/ML 100 UNIT/ML
500 SOLUTION INTRAVENOUS PRN
OUTPATIENT
Start: 2023-02-27

## 2023-02-01 RX ORDER — EPINEPHRINE 1 MG/ML
0.3 INJECTION, SOLUTION, CONCENTRATE INTRAVENOUS PRN
OUTPATIENT
Start: 2023-02-27

## 2023-02-01 RX ORDER — SODIUM CHLORIDE 0.9 % (FLUSH) 0.9 %
5-40 SYRINGE (ML) INJECTION PRN
OUTPATIENT
Start: 2023-02-27

## 2023-02-01 RX ORDER — ACETAMINOPHEN 325 MG/1
650 TABLET ORAL
OUTPATIENT
Start: 2023-02-27

## 2023-02-01 RX ORDER — SODIUM CHLORIDE 9 MG/ML
INJECTION, SOLUTION INTRAVENOUS CONTINUOUS
OUTPATIENT
Start: 2023-02-27

## 2023-02-01 RX ORDER — FAMOTIDINE 10 MG/ML
20 INJECTION, SOLUTION INTRAVENOUS
OUTPATIENT
Start: 2023-02-27

## 2023-02-01 RX ORDER — ONDANSETRON 2 MG/ML
8 INJECTION INTRAMUSCULAR; INTRAVENOUS
OUTPATIENT
Start: 2023-02-27

## 2023-02-01 RX ORDER — DIPHENHYDRAMINE HYDROCHLORIDE 50 MG/ML
50 INJECTION INTRAMUSCULAR; INTRAVENOUS
OUTPATIENT
Start: 2023-02-27

## 2023-02-22 RX ORDER — DULOXETIN HYDROCHLORIDE 60 MG/1
CAPSULE, DELAYED RELEASE ORAL
Qty: 90 CAPSULE | Refills: 1 | Status: SHIPPED | OUTPATIENT
Start: 2023-02-22

## 2023-02-27 ENCOUNTER — HOSPITAL ENCOUNTER (OUTPATIENT)
Dept: ONCOLOGY | Age: 33
Setting detail: INFUSION SERIES
Discharge: HOME | End: 2023-02-27
Payer: MEDICARE

## 2023-02-27 VITALS
HEART RATE: 88 BPM | OXYGEN SATURATION: 99 % | BODY MASS INDEX: 26.15 KG/M2 | WEIGHT: 162.04 LBS | RESPIRATION RATE: 16 BRPM | SYSTOLIC BLOOD PRESSURE: 123 MMHG | TEMPERATURE: 98.9 F | DIASTOLIC BLOOD PRESSURE: 63 MMHG

## 2023-02-27 DIAGNOSIS — M32.9 SYSTEMIC LUPUS ERYTHEMATOSUS, UNSPECIFIED SLE TYPE, UNSPECIFIED ORGAN INVOLVEMENT STATUS (HCC): Primary | ICD-10-CM

## 2023-02-27 LAB
A/G RATIO: 1.9 (ref 1.1–2.2)
ALBUMIN SERPL-MCNC: 4.3 G/DL (ref 3.4–5)
ALP BLD-CCNC: 61 U/L (ref 40–129)
ALT SERPL-CCNC: 13 U/L (ref 10–40)
ANION GAP SERPL CALCULATED.3IONS-SCNC: 11 MMOL/L (ref 3–16)
AST SERPL-CCNC: 22 U/L (ref 15–37)
BASOPHILS ABSOLUTE: 0 K/UL (ref 0–0.2)
BASOPHILS RELATIVE PERCENT: 0.7 %
BILIRUB SERPL-MCNC: <0.2 MG/DL (ref 0–1)
BUN BLDV-MCNC: 15 MG/DL (ref 7–20)
C-REACTIVE PROTEIN: <3 MG/L (ref 0–5.1)
C3 COMPLEMENT: 102.2 MG/DL (ref 90–180)
C4 COMPLEMENT: 13.6 MG/DL (ref 10–40)
CALCIUM SERPL-MCNC: 9.2 MG/DL (ref 8.3–10.6)
CHLORIDE BLD-SCNC: 106 MMOL/L (ref 99–110)
CO2: 21 MMOL/L (ref 21–32)
CREAT SERPL-MCNC: 0.9 MG/DL (ref 0.6–1.1)
EOSINOPHILS ABSOLUTE: 0.2 K/UL (ref 0–0.6)
EOSINOPHILS RELATIVE PERCENT: 5.6 %
GFR SERPL CREATININE-BSD FRML MDRD: >60 ML/MIN/{1.73_M2}
GLUCOSE BLD-MCNC: 106 MG/DL (ref 70–99)
HCT VFR BLD CALC: 37.6 % (ref 36–48)
HEMOGLOBIN: 12.2 G/DL (ref 12–16)
LYMPHOCYTES ABSOLUTE: 1 K/UL (ref 1–5.1)
LYMPHOCYTES RELATIVE PERCENT: 36 %
MCH RBC QN AUTO: 25.4 PG (ref 26–34)
MCHC RBC AUTO-ENTMCNC: 32.3 G/DL (ref 31–36)
MCV RBC AUTO: 78.5 FL (ref 80–100)
MONOCYTES ABSOLUTE: 0.3 K/UL (ref 0–1.3)
MONOCYTES RELATIVE PERCENT: 11.2 %
NEUTROPHILS ABSOLUTE: 1.3 K/UL (ref 1.7–7.7)
NEUTROPHILS RELATIVE PERCENT: 46.5 %
PDW BLD-RTO: 14.7 % (ref 12.4–15.4)
PLATELET # BLD: 291 K/UL (ref 135–450)
PMV BLD AUTO: 8.7 FL (ref 5–10.5)
POTASSIUM SERPL-SCNC: 3.9 MMOL/L (ref 3.5–5.1)
RBC # BLD: 4.79 M/UL (ref 4–5.2)
SEDIMENTATION RATE, ERYTHROCYTE: 14 MM/HR (ref 0–20)
SODIUM BLD-SCNC: 138 MMOL/L (ref 136–145)
TOTAL PROTEIN: 6.6 G/DL (ref 6.4–8.2)
WBC # BLD: 2.8 K/UL (ref 4–11)

## 2023-02-27 PROCEDURE — 86160 COMPLEMENT ANTIGEN: CPT

## 2023-02-27 PROCEDURE — 6360000002 HC RX W HCPCS: Performed by: INTERNAL MEDICINE

## 2023-02-27 PROCEDURE — 86140 C-REACTIVE PROTEIN: CPT

## 2023-02-27 PROCEDURE — 85025 COMPLETE CBC W/AUTO DIFF WBC: CPT

## 2023-02-27 PROCEDURE — 2580000003 HC RX 258: Performed by: INTERNAL MEDICINE

## 2023-02-27 PROCEDURE — 85652 RBC SED RATE AUTOMATED: CPT

## 2023-02-27 PROCEDURE — 86256 FLUORESCENT ANTIBODY TITER: CPT

## 2023-02-27 PROCEDURE — 86225 DNA ANTIBODY NATIVE: CPT

## 2023-02-27 PROCEDURE — 80053 COMPREHEN METABOLIC PANEL: CPT

## 2023-02-27 PROCEDURE — 96365 THER/PROPH/DIAG IV INF INIT: CPT

## 2023-02-27 PROCEDURE — 36415 COLL VENOUS BLD VENIPUNCTURE: CPT

## 2023-02-27 RX ORDER — ONDANSETRON 2 MG/ML
8 INJECTION INTRAMUSCULAR; INTRAVENOUS
OUTPATIENT
Start: 2023-03-27

## 2023-02-27 RX ORDER — SODIUM CHLORIDE 9 MG/ML
5-250 INJECTION, SOLUTION INTRAVENOUS PRN
OUTPATIENT
Start: 2023-03-27

## 2023-02-27 RX ORDER — ACETAMINOPHEN 325 MG/1
650 TABLET ORAL
OUTPATIENT
Start: 2023-03-27

## 2023-02-27 RX ORDER — SODIUM CHLORIDE 0.9 % (FLUSH) 0.9 %
5-40 SYRINGE (ML) INJECTION PRN
OUTPATIENT
Start: 2023-03-27

## 2023-02-27 RX ORDER — DIPHENHYDRAMINE HYDROCHLORIDE 50 MG/ML
50 INJECTION INTRAMUSCULAR; INTRAVENOUS
OUTPATIENT
Start: 2023-03-27

## 2023-02-27 RX ORDER — HEPARIN SODIUM (PORCINE) LOCK FLUSH IV SOLN 100 UNIT/ML 100 UNIT/ML
500 SOLUTION INTRAVENOUS PRN
OUTPATIENT
Start: 2023-03-27

## 2023-02-27 RX ORDER — SODIUM CHLORIDE 9 MG/ML
5-250 INJECTION, SOLUTION INTRAVENOUS PRN
Status: DISCONTINUED | OUTPATIENT
Start: 2023-02-27 | End: 2023-02-28 | Stop reason: HOSPADM

## 2023-02-27 RX ORDER — SODIUM CHLORIDE 9 MG/ML
INJECTION, SOLUTION INTRAVENOUS CONTINUOUS
OUTPATIENT
Start: 2023-03-27

## 2023-02-27 RX ORDER — ALBUTEROL SULFATE 90 UG/1
4 AEROSOL, METERED RESPIRATORY (INHALATION) PRN
OUTPATIENT
Start: 2023-03-27

## 2023-02-27 RX ADMIN — BELIMUMAB 736 MG: 400 INJECTION, POWDER, LYOPHILIZED, FOR SOLUTION INTRAVENOUS at 10:33

## 2023-02-27 NOTE — PROGRESS NOTES
[facility] Ben[facility]sta [facility] per orders from Dr. Stacey Huynh. Pt tolerated infusion well and without incident. Pt verbalizes understanding of discharge instructions. Discharged ambulatory to home with self.    Shanti Kwong RN

## 2023-02-28 LAB — ANTI-DSDNA IGG: 12 IU/ML (ref 0–9)

## 2023-03-02 LAB — DSDNA ANTIBODY TITER: ABNORMAL

## 2023-03-27 ENCOUNTER — HOSPITAL ENCOUNTER (OUTPATIENT)
Dept: ONCOLOGY | Age: 33
Setting detail: INFUSION SERIES
Discharge: HOME | End: 2023-03-27
Payer: MEDICARE

## 2023-03-27 VITALS
BODY MASS INDEX: 25.01 KG/M2 | WEIGHT: 154.98 LBS | TEMPERATURE: 98.6 F | HEART RATE: 111 BPM | SYSTOLIC BLOOD PRESSURE: 130 MMHG | DIASTOLIC BLOOD PRESSURE: 75 MMHG | RESPIRATION RATE: 18 BRPM | OXYGEN SATURATION: 100 %

## 2023-03-27 DIAGNOSIS — M32.9 SYSTEMIC LUPUS ERYTHEMATOSUS, UNSPECIFIED SLE TYPE, UNSPECIFIED ORGAN INVOLVEMENT STATUS (HCC): Primary | ICD-10-CM

## 2023-03-27 PROCEDURE — 2580000003 HC RX 258: Performed by: INTERNAL MEDICINE

## 2023-03-27 PROCEDURE — 96365 THER/PROPH/DIAG IV INF INIT: CPT

## 2023-03-27 PROCEDURE — 6360000002 HC RX W HCPCS: Performed by: INTERNAL MEDICINE

## 2023-03-27 RX ORDER — EPINEPHRINE 1 MG/ML
0.3 INJECTION, SOLUTION INTRAMUSCULAR; SUBCUTANEOUS PRN
OUTPATIENT
Start: 2023-04-24

## 2023-03-27 RX ORDER — SODIUM CHLORIDE 9 MG/ML
5-250 INJECTION, SOLUTION INTRAVENOUS PRN
Status: DISCONTINUED | OUTPATIENT
Start: 2023-03-27 | End: 2023-03-28 | Stop reason: HOSPADM

## 2023-03-27 RX ORDER — ACETAMINOPHEN 325 MG/1
650 TABLET ORAL
OUTPATIENT
Start: 2023-04-24

## 2023-03-27 RX ORDER — SODIUM CHLORIDE 9 MG/ML
INJECTION, SOLUTION INTRAVENOUS CONTINUOUS
OUTPATIENT
Start: 2023-04-24

## 2023-03-27 RX ORDER — SODIUM CHLORIDE 0.9 % (FLUSH) 0.9 %
5-40 SYRINGE (ML) INJECTION PRN
OUTPATIENT
Start: 2023-04-24

## 2023-03-27 RX ORDER — DIPHENHYDRAMINE HYDROCHLORIDE 50 MG/ML
50 INJECTION INTRAMUSCULAR; INTRAVENOUS
OUTPATIENT
Start: 2023-04-24

## 2023-03-27 RX ORDER — ALBUTEROL SULFATE 90 UG/1
4 AEROSOL, METERED RESPIRATORY (INHALATION) PRN
OUTPATIENT
Start: 2023-04-24

## 2023-03-27 RX ORDER — SODIUM CHLORIDE 9 MG/ML
5-250 INJECTION, SOLUTION INTRAVENOUS PRN
OUTPATIENT
Start: 2023-04-24

## 2023-03-27 RX ORDER — HEPARIN SODIUM (PORCINE) LOCK FLUSH IV SOLN 100 UNIT/ML 100 UNIT/ML
500 SOLUTION INTRAVENOUS PRN
OUTPATIENT
Start: 2023-04-24

## 2023-03-27 RX ORDER — ONDANSETRON 2 MG/ML
8 INJECTION INTRAMUSCULAR; INTRAVENOUS
OUTPATIENT
Start: 2023-04-24

## 2023-03-27 RX ORDER — ARIPIPRAZOLE 5 MG/1
5 TABLET ORAL DAILY
COMMUNITY

## 2023-03-27 RX ADMIN — BELIMUMAB 704 MG: 400 INJECTION, POWDER, LYOPHILIZED, FOR SOLUTION INTRAVENOUS at 11:48

## 2023-03-27 NOTE — PROGRESS NOTES
[facility] per orders from Dr. Aaliyah Kelly. Pt tolerated infusion well and without incident. Pt verbalizes understanding of discharge instructions. Discharged ambulatory to home with self.    Dez Barfield RN

## 2023-04-26 ENCOUNTER — HOSPITAL ENCOUNTER (OUTPATIENT)
Dept: ONCOLOGY | Age: 33
Setting detail: INFUSION SERIES
Discharge: HOME | End: 2023-04-26
Payer: MEDICARE

## 2023-04-26 VITALS
HEART RATE: 99 BPM | HEIGHT: 67 IN | SYSTOLIC BLOOD PRESSURE: 127 MMHG | RESPIRATION RATE: 16 BRPM | TEMPERATURE: 99.3 F | DIASTOLIC BLOOD PRESSURE: 73 MMHG | BODY MASS INDEX: 24.6 KG/M2 | OXYGEN SATURATION: 100 % | WEIGHT: 156.75 LBS

## 2023-04-26 DIAGNOSIS — M32.9 SYSTEMIC LUPUS ERYTHEMATOSUS, UNSPECIFIED SLE TYPE, UNSPECIFIED ORGAN INVOLVEMENT STATUS (HCC): Primary | ICD-10-CM

## 2023-04-26 PROCEDURE — 96365 THER/PROPH/DIAG IV INF INIT: CPT

## 2023-04-26 PROCEDURE — 6360000002 HC RX W HCPCS: Performed by: INTERNAL MEDICINE

## 2023-04-26 PROCEDURE — 2580000003 HC RX 258: Performed by: INTERNAL MEDICINE

## 2023-04-26 RX ORDER — HEPARIN SODIUM (PORCINE) LOCK FLUSH IV SOLN 100 UNIT/ML 100 UNIT/ML
500 SOLUTION INTRAVENOUS PRN
OUTPATIENT
Start: 2023-05-21

## 2023-04-26 RX ORDER — ONDANSETRON 2 MG/ML
8 INJECTION INTRAMUSCULAR; INTRAVENOUS
OUTPATIENT
Start: 2023-05-21

## 2023-04-26 RX ORDER — ACETAMINOPHEN 325 MG/1
650 TABLET ORAL
OUTPATIENT
Start: 2023-05-21

## 2023-04-26 RX ORDER — EPINEPHRINE 1 MG/ML
0.3 INJECTION, SOLUTION INTRAMUSCULAR; SUBCUTANEOUS PRN
OUTPATIENT
Start: 2023-05-21

## 2023-04-26 RX ORDER — SODIUM CHLORIDE 9 MG/ML
INJECTION, SOLUTION INTRAVENOUS CONTINUOUS
OUTPATIENT
Start: 2023-05-21

## 2023-04-26 RX ORDER — SODIUM CHLORIDE 0.9 % (FLUSH) 0.9 %
5-40 SYRINGE (ML) INJECTION PRN
OUTPATIENT
Start: 2023-05-21

## 2023-04-26 RX ORDER — SODIUM CHLORIDE 9 MG/ML
5-250 INJECTION, SOLUTION INTRAVENOUS PRN
OUTPATIENT
Start: 2023-05-21

## 2023-04-26 RX ORDER — SODIUM CHLORIDE 9 MG/ML
5-250 INJECTION, SOLUTION INTRAVENOUS PRN
Status: DISCONTINUED | OUTPATIENT
Start: 2023-04-26 | End: 2023-04-27 | Stop reason: HOSPADM

## 2023-04-26 RX ORDER — ALBUTEROL SULFATE 90 UG/1
4 AEROSOL, METERED RESPIRATORY (INHALATION) PRN
OUTPATIENT
Start: 2023-05-21

## 2023-04-26 RX ORDER — SODIUM CHLORIDE 0.9 % (FLUSH) 0.9 %
5-40 SYRINGE (ML) INJECTION PRN
Status: DISCONTINUED | OUTPATIENT
Start: 2023-04-26 | End: 2023-04-27 | Stop reason: HOSPADM

## 2023-04-26 RX ORDER — DIPHENHYDRAMINE HYDROCHLORIDE 50 MG/ML
50 INJECTION INTRAMUSCULAR; INTRAVENOUS
OUTPATIENT
Start: 2023-05-21

## 2023-04-26 RX ADMIN — BELIMUMAB 712 MG: 400 INJECTION, POWDER, LYOPHILIZED, FOR SOLUTION INTRAVENOUS at 12:03

## 2023-04-26 RX ADMIN — SODIUM CHLORIDE 25 ML/HR: 9 INJECTION, SOLUTION INTRAVENOUS at 10:18

## 2023-04-26 RX ADMIN — SODIUM CHLORIDE, PRESERVATIVE FREE 10 ML: 5 INJECTION INTRAVENOUS at 10:15

## 2023-04-26 ASSESSMENT — PAIN SCALES - GENERAL: PAINLEVEL_OUTOF10: 6

## 2023-04-26 ASSESSMENT — PAIN DESCRIPTION - DESCRIPTORS: DESCRIPTORS: CRAMPING

## 2023-04-26 ASSESSMENT — PAIN DESCRIPTION - PAIN TYPE: TYPE: CHRONIC PAIN

## 2023-04-26 ASSESSMENT — PAIN DESCRIPTION - FREQUENCY: FREQUENCY: CONTINUOUS

## 2023-04-26 ASSESSMENT — PAIN DESCRIPTION - ONSET: ONSET: ON-GOING

## 2023-04-26 ASSESSMENT — PAIN DESCRIPTION - LOCATION: LOCATION: GENERALIZED

## 2023-04-26 NOTE — PROGRESS NOTES
Pt seen and assessed at [facility] today for 712 mg Benlysta infusion per orders from Dr. Trisha Donovan. Infused per [facility] policy. Monitoring completed for infusion reactions - see flowsheet. Pt tolerated infusion well and without incident. Pt verbalizes understanding of discharge instructions. Discharged ambulatory to their home.     Electronically signed by Olga Rios RN on 4/26/2023 at 2:06 PM

## 2023-05-11 ENCOUNTER — TELEPHONE (OUTPATIENT)
Dept: INTERNAL MEDICINE CLINIC | Age: 33
End: 2023-05-11

## 2023-05-11 RX ORDER — DOXYCYCLINE HYCLATE 100 MG
100 TABLET ORAL 2 TIMES DAILY
Qty: 10 TABLET | Refills: 0 | Status: SHIPPED | OUTPATIENT
Start: 2023-05-11 | End: 2023-05-16

## 2023-05-11 RX ORDER — BENZONATATE 100 MG/1
100-200 CAPSULE ORAL 3 TIMES DAILY PRN
Qty: 60 CAPSULE | Refills: 0 | Status: SHIPPED | OUTPATIENT
Start: 2023-05-11 | End: 2023-05-18

## 2023-05-11 NOTE — TELEPHONE ENCOUNTER
Patient has sinus congestion, chest congestion, sore throat, runny nose and ear stuffiness. This has been going on since Monday. Patient did test negative for covid yesterday. Patient has tried Mucinex and medication for sore throat. Would like something called in.       [id number], [address] - [address] [phone]      Pls advise

## 2023-05-17 ENCOUNTER — OFFICE VISIT (OUTPATIENT)
Dept: INTERNAL MEDICINE CLINIC | Age: 33
End: 2023-05-17
Payer: MEDICARE

## 2023-05-17 VITALS
BODY MASS INDEX: 24.27 KG/M2 | SYSTOLIC BLOOD PRESSURE: 128 MMHG | DIASTOLIC BLOOD PRESSURE: 70 MMHG | HEIGHT: 66 IN | WEIGHT: 151 LBS

## 2023-05-17 DIAGNOSIS — M32.9 SYSTEMIC LUPUS ERYTHEMATOSUS, UNSPECIFIED SLE TYPE, UNSPECIFIED ORGAN INVOLVEMENT STATUS (HCC): ICD-10-CM

## 2023-05-17 DIAGNOSIS — M79.7 FIBROMYALGIA: Primary | Chronic | ICD-10-CM

## 2023-05-17 PROCEDURE — 99214 OFFICE O/P EST MOD 30 MIN: CPT | Performed by: INTERNAL MEDICINE

## 2023-05-17 PROCEDURE — 1036F TOBACCO NON-USER: CPT | Performed by: INTERNAL MEDICINE

## 2023-05-17 PROCEDURE — G8420 CALC BMI NORM PARAMETERS: HCPCS | Performed by: INTERNAL MEDICINE

## 2023-05-17 PROCEDURE — G8427 DOCREV CUR MEDS BY ELIG CLIN: HCPCS | Performed by: INTERNAL MEDICINE

## 2023-05-17 RX ORDER — PREGABALIN 100 MG/1
100 CAPSULE ORAL 2 TIMES DAILY
Qty: 180 CAPSULE | Refills: 1 | Status: SHIPPED | OUTPATIENT
Start: 2023-05-17 | End: 2023-11-13

## 2023-05-17 RX ORDER — CLOBETASOL PROPIONATE 0.5 MG/G
OINTMENT TOPICAL
Qty: 30 G | Refills: 1 | Status: SHIPPED | OUTPATIENT
Start: 2023-05-17

## 2023-05-17 SDOH — ECONOMIC STABILITY: FOOD INSECURITY: WITHIN THE PAST 12 MONTHS, THE FOOD YOU BOUGHT JUST DIDN'T LAST AND YOU DIDN'T HAVE MONEY TO GET MORE.: NEVER TRUE

## 2023-05-17 SDOH — ECONOMIC STABILITY: FOOD INSECURITY: WITHIN THE PAST 12 MONTHS, YOU WORRIED THAT YOUR FOOD WOULD RUN OUT BEFORE YOU GOT MONEY TO BUY MORE.: NEVER TRUE

## 2023-05-17 SDOH — ECONOMIC STABILITY: HOUSING INSECURITY
IN THE LAST 12 MONTHS, WAS THERE A TIME WHEN YOU DID NOT HAVE A STEADY PLACE TO SLEEP OR SLEPT IN A SHELTER (INCLUDING NOW)?: NO

## 2023-05-17 SDOH — ECONOMIC STABILITY: INCOME INSECURITY: HOW HARD IS IT FOR YOU TO PAY FOR THE VERY BASICS LIKE FOOD, HOUSING, MEDICAL CARE, AND HEATING?: NOT HARD AT ALL

## 2023-05-17 ASSESSMENT — PATIENT HEALTH QUESTIONNAIRE - PHQ9
SUM OF ALL RESPONSES TO PHQ QUESTIONS 1-9: 0
2. FEELING DOWN, DEPRESSED OR HOPELESS: 0
SUM OF ALL RESPONSES TO PHQ QUESTIONS 1-9: 0
SUM OF ALL RESPONSES TO PHQ QUESTIONS 1-9: 0
SUM OF ALL RESPONSES TO PHQ9 QUESTIONS 1 & 2: 0
SUM OF ALL RESPONSES TO PHQ QUESTIONS 1-9: 0
1. LITTLE INTEREST OR PLEASURE IN DOING THINGS: 0

## 2023-05-17 NOTE — PROGRESS NOTES
Narciso Hester (:  1990) is a 29 y.o. adult,Established patient, here for evaluation of the following chief complaint(s):  Medication Check         ASSESSMENT/PLAN:  1. Fibromyalgia  - increase pregabalin to 100mg twice daily. OARRS reviewed, no concerns  -     pregabalin (LYRICA) 100 MG capsule; Take 1 capsule by mouth 2 times daily for 180 days. Max Daily Amount: 200 mg, Disp-180 capsule, R-1Normal  2. Systemic lupus erythematosus, unspecified SLE type, unspecified organ involvement status ([facility] [address]   - discussed trying to find a new rheumatologist, may try [facility], she looked into [facility] but they do not take her insurance   - Refilled clobetasol 0.05% ointment    Return in about 6 months (around 2023) for chronic pain. Subjective   SUBJECTIVE/OBJECTIVE:  HPI    Since her rheumatologist left, she has been trying to find a new one but having difficulty due to insurance. She has been on Lyrica 75 mg twice daily which helps with her chronic widespread pain. She feels that she has become tolerant to it. No significant side effects, she does not feel sleepy during the day    She is not going to be able to continue Benlysta, she is concerned about her lupus symptoms flaring up. She would like a refill on clobetasol, she uses it on that she has been exposed to the sun, uses it sparingly. Review of Systems       Objective   Physical Exam  Vitals reviewed. Constitutional:       General: Narciso Hester is not in acute distress. Appearance: Normal appearance. Narciso Hester is well-developed. HENT:      Head: Normocephalic and atraumatic. Pulmonary:      Effort: Pulmonary effort is normal. No respiratory distress. Skin:     General: Skin is warm and dry. Neurological:      Mental Status: Narciso Hester is alert. Psychiatric:         Behavior: Behavior normal.         Thought Content:  Thought content normal.         Judgment: Judgment normal.                An electronic

## 2023-05-19 ENCOUNTER — OFFICE VISIT (OUTPATIENT)
Dept: PULMONOLOGY | Age: 33
End: 2023-05-19
Payer: MEDICARE

## 2023-05-19 VITALS
SYSTOLIC BLOOD PRESSURE: 110 MMHG | OXYGEN SATURATION: 100 % | HEIGHT: 66 IN | DIASTOLIC BLOOD PRESSURE: 76 MMHG | HEART RATE: 65 BPM | BODY MASS INDEX: 24.75 KG/M2 | WEIGHT: 154 LBS

## 2023-05-19 DIAGNOSIS — E03.8 OTHER SPECIFIED HYPOTHYROIDISM: Chronic | ICD-10-CM

## 2023-05-19 DIAGNOSIS — G47.33 OSA (OBSTRUCTIVE SLEEP APNEA): Primary | Chronic | ICD-10-CM

## 2023-05-19 DIAGNOSIS — F51.04 PSYCHOPHYSIOLOGICAL INSOMNIA: Chronic | ICD-10-CM

## 2023-05-19 PROCEDURE — G8427 DOCREV CUR MEDS BY ELIG CLIN: HCPCS | Performed by: NURSE PRACTITIONER

## 2023-05-19 PROCEDURE — G8420 CALC BMI NORM PARAMETERS: HCPCS | Performed by: NURSE PRACTITIONER

## 2023-05-19 PROCEDURE — 1036F TOBACCO NON-USER: CPT | Performed by: NURSE PRACTITIONER

## 2023-05-19 PROCEDURE — 99214 OFFICE O/P EST MOD 30 MIN: CPT | Performed by: NURSE PRACTITIONER

## 2023-05-19 ASSESSMENT — SLEEP AND FATIGUE QUESTIONNAIRES
HOW LIKELY ARE YOU TO NOD OFF OR FALL ASLEEP IN A CAR, WHILE STOPPED FOR A FEW MINUTES IN TRAFFIC: 0
HOW LIKELY ARE YOU TO NOD OFF OR FALL ASLEEP WHILE LYING DOWN TO REST IN THE AFTERNOON WHEN CIRCUMSTANCES PERMIT: 3
HOW LIKELY ARE YOU TO NOD OFF OR FALL ASLEEP WHILE SITTING AND TALKING TO SOMEONE: 0
HOW LIKELY ARE YOU TO NOD OFF OR FALL ASLEEP WHILE WATCHING TV: 2
HOW LIKELY ARE YOU TO NOD OFF OR FALL ASLEEP WHILE SITTING INACTIVE IN A PUBLIC PLACE: 1
HOW LIKELY ARE YOU TO NOD OFF OR FALL ASLEEP WHILE SITTING AND READING: 3
HOW LIKELY ARE YOU TO NOD OFF OR FALL ASLEEP WHILE SITTING QUIETLY AFTER LUNCH WITHOUT ALCOHOL: 1
ESS TOTAL SCORE: 12
HOW LIKELY ARE YOU TO NOD OFF OR FALL ASLEEP WHEN YOU ARE A PASSENGER IN A CAR FOR AN HOUR WITHOUT A BREAK: 2

## 2023-05-19 NOTE — ASSESSMENT & PLAN NOTE
Chronic- Stable. Discussed the importance of treating obstructive sleep apnea as part of the management of this disorder. She continues to take trazodone 50 mg each night to help her initiate maintain sleep. She reports her symptoms are controlled at the current dose and she denies side effects. Medication is currently being managed by psychiatry.

## 2023-05-19 NOTE — PROGRESS NOTES
[address]  1990  [address]  [phone] (home)   [phone] (mobile)      Insurance Info (confirm with patient if correct):  Payer/Plan Subscr  Sex Relation Sub.  Ins. ID Effective Group Num

## 2023-05-19 NOTE — PROGRESS NOTES
each night to help her initiate maintain sleep. She reports her symptoms are controlled at the current dose and she denies side effects. Medication is currently being managed by psychiatry. Reviewed, analyzed, and documented physiologic data from patient's PAP machine. This information was analyzed to assess complexity and medical decision making in regards to further testing and management. The primary encounter diagnosis was LEOLA (obstructive sleep apnea). Diagnoses of Other specified hypothyroidism and Psychophysiological insomnia were also pertinent to this visit. The chronic medical conditions listed are directly related to the primary diagnosis listed above. The management of the primary diagnosis affects the secondary diagnosis and vice versa. Subjective:   Subjective   Patient ID: Cristo Patel is a 29 y.o. adult. Chief Complaint   Patient presents with    Sleep Apnea       HPI:  Machine Modem/Download Info:  Compliance (hours/night): 5.9 hrs/night  % of nights >= 4 hrs: 76 %  Download AHI (/hour): 0.6 /HR  Average CPAP Pressure : 11.9 cmH2O      APAP - Settings  Pressure Min: 11 cmH2O  Pressure Max: 20 cmH2O                 Comfort Settings  Flex/EPR (0-3): 3 PAP Mask  Mask Type: Full Face mask     Cristo Patel presents today for follow-up for sleep apnea. She reports she is doing well with her machine. Sometimes will wake during the night feeling short of breath and occasionally will take her mask off during the night without realizing. She did notice improvement with the pressure adjustments made to her machine during her previous visit. She has some nasal dryness. She has been using lanolin ointment which seems to help. She has not tried adjusting the moisture settings on her machine. She continues to take Ritalin which was initiated by psychiatry and feels it helps with her daytime symptoms.   She denies headaches, congestion, nosebleeds,  aerophagia, or drowsiness while

## 2023-05-19 NOTE — ASSESSMENT & PLAN NOTE
Chronic-Stable: Reviewed and analyzed results of physiologic download from patient's machine and reviewed with patient. Supplies and parts as needed for [employer]. These are medically necessary. Limit caffeine use after 3pm. Based on the analyzed data will change following settings: P min increased to 12. Will trial pressure increase. Discussed adjusting the moisture settings on her machine to help with nasal dryness. Will see her back in 6 months. Encouraged her to contact the office any questions or concerns. Encouraged consistent use of her machine each night, all night. Discussed the importance of treating LEOLA from a physiological standpoint. Instructed not to drive unless had 4 hrs of effective therapy for her LEOLA the night before. No driving when sleepy. Did review the risks of under or untreated LEOLA including, but not limited to, higher risks of motor vehicle accidents, stroke, heart attacks, and death. She understands and accepts all these risks.

## 2023-05-30 RX ORDER — AMLODIPINE BESYLATE 5 MG/1
TABLET ORAL
Qty: 90 TABLET | Refills: 0 | OUTPATIENT
Start: 2023-05-30

## 2023-06-27 ENCOUNTER — OFFICE VISIT (OUTPATIENT)
Dept: NEUROLOGY | Age: 33
End: 2023-06-27
Payer: MEDICARE

## 2023-06-27 VITALS
SYSTOLIC BLOOD PRESSURE: 117 MMHG | HEIGHT: 66 IN | WEIGHT: 154 LBS | HEART RATE: 81 BPM | DIASTOLIC BLOOD PRESSURE: 71 MMHG | BODY MASS INDEX: 24.75 KG/M2

## 2023-06-27 DIAGNOSIS — G47.33 OSA (OBSTRUCTIVE SLEEP APNEA): ICD-10-CM

## 2023-06-27 DIAGNOSIS — G43.119 INTRACTABLE MIGRAINE WITH AURA WITHOUT STATUS MIGRAINOSUS: Primary | ICD-10-CM

## 2023-06-27 PROCEDURE — G8427 DOCREV CUR MEDS BY ELIG CLIN: HCPCS | Performed by: PSYCHIATRY & NEUROLOGY

## 2023-06-27 PROCEDURE — 1036F TOBACCO NON-USER: CPT | Performed by: PSYCHIATRY & NEUROLOGY

## 2023-06-27 PROCEDURE — 99213 OFFICE O/P EST LOW 20 MIN: CPT | Performed by: PSYCHIATRY & NEUROLOGY

## 2023-06-27 PROCEDURE — G8420 CALC BMI NORM PARAMETERS: HCPCS | Performed by: PSYCHIATRY & NEUROLOGY

## 2023-06-27 RX ORDER — ERENUMAB-AOOE 140 MG/ML
INJECTION, SOLUTION SUBCUTANEOUS
Qty: 140 ML | Refills: 5 | Status: SHIPPED | OUTPATIENT
Start: 2023-06-27

## 2023-06-27 RX ORDER — RIMEGEPANT SULFATE 75 MG/75MG
TABLET, ORALLY DISINTEGRATING ORAL
Qty: 8 TABLET | Refills: 1 | Status: SHIPPED | OUTPATIENT
Start: 2023-06-27

## 2023-07-12 ENCOUNTER — APPOINTMENT (OUTPATIENT)
Dept: CT IMAGING | Age: 33
End: 2023-07-12
Payer: MEDICARE

## 2023-07-12 ENCOUNTER — HOSPITAL ENCOUNTER (EMERGENCY)
Age: 33
Discharge: HOME | End: 2023-07-12
Attending: EMERGENCY MEDICINE
Payer: MEDICARE

## 2023-07-12 VITALS
OXYGEN SATURATION: 97 % | SYSTOLIC BLOOD PRESSURE: 122 MMHG | HEART RATE: 86 BPM | RESPIRATION RATE: 18 BRPM | TEMPERATURE: 98.7 F | DIASTOLIC BLOOD PRESSURE: 94 MMHG | WEIGHT: 150.57 LBS | BODY MASS INDEX: 24.3 KG/M2

## 2023-07-12 DIAGNOSIS — K52.9 COLITIS: ICD-10-CM

## 2023-07-12 DIAGNOSIS — R10.31 RIGHT LOWER QUADRANT ABDOMINAL PAIN: Primary | ICD-10-CM

## 2023-07-12 LAB
ALBUMIN SERPL-MCNC: 4.1 G/DL (ref 3.4–5)
ALBUMIN/GLOB SERPL: 1.9 {RATIO} (ref 1.1–2.2)
ALP SERPL-CCNC: 70 U/L (ref 40–129)
ALT SERPL-CCNC: 6 U/L (ref 10–40)
ANION GAP SERPL CALCULATED.3IONS-SCNC: 9 MMOL/L (ref 3–16)
AST SERPL-CCNC: 14 U/L (ref 15–37)
BASOPHILS # BLD: 0 K/UL (ref 0–0.2)
BASOPHILS NFR BLD: 0.7 %
BILIRUB SERPL-MCNC: <0.2 MG/DL (ref 0–1)
BILIRUB UR QL STRIP.AUTO: NEGATIVE
BUN SERPL-MCNC: 16 MG/DL (ref 7–20)
CALCIUM SERPL-MCNC: 8.9 MG/DL (ref 8.3–10.6)
CHLORIDE SERPL-SCNC: 106 MMOL/L (ref 99–110)
CLARITY UR: CLEAR
CO2 SERPL-SCNC: 27 MMOL/L (ref 21–32)
COLOR UR: YELLOW
CREAT SERPL-MCNC: 0.8 MG/DL (ref 0.6–1.1)
DEPRECATED RDW RBC AUTO: 17.7 % (ref 12.4–15.4)
EOSINOPHIL # BLD: 0.1 K/UL (ref 0–0.6)
EOSINOPHIL NFR BLD: 1.8 %
EPI CELLS #/AREA URNS HPF: NORMAL /HPF (ref 0–5)
GFR SERPLBLD CREATININE-BSD FMLA CKD-EPI: >60 ML/MIN/{1.73_M2}
GLUCOSE SERPL-MCNC: 104 MG/DL (ref 70–99)
GLUCOSE UR STRIP.AUTO-MCNC: NEGATIVE MG/DL
HCG UR QL: NEGATIVE
HCT VFR BLD AUTO: 32.9 % (ref 36–48)
HGB BLD-MCNC: 10.8 G/DL (ref 12–16)
HGB UR QL STRIP.AUTO: NEGATIVE
KETONES UR STRIP.AUTO-MCNC: NEGATIVE MG/DL
LEUKOCYTE ESTERASE UR QL STRIP.AUTO: NEGATIVE
LIPASE SERPL-CCNC: 24 U/L (ref 13–60)
LYMPHOCYTES # BLD: 1.1 K/UL (ref 1–5.1)
LYMPHOCYTES NFR BLD: 28.6 %
MCH RBC QN AUTO: 25.9 PG (ref 26–34)
MCHC RBC AUTO-ENTMCNC: 32.8 G/DL (ref 31–36)
MCV RBC AUTO: 79.1 FL (ref 80–100)
MONOCYTES # BLD: 0.3 K/UL (ref 0–1.3)
MONOCYTES NFR BLD: 8.6 %
NEUTROPHILS # BLD: 2.3 K/UL (ref 1.7–7.7)
NEUTROPHILS NFR BLD: 60.3 %
NITRITE UR QL STRIP.AUTO: NEGATIVE
PH UR STRIP.AUTO: 8 [PH] (ref 5–8)
PLATELET # BLD AUTO: 195 K/UL (ref 135–450)
PMV BLD AUTO: 8.4 FL (ref 5–10.5)
POTASSIUM SERPL-SCNC: 3.7 MMOL/L (ref 3.5–5.1)
PROT SERPL-MCNC: 6.3 G/DL (ref 6.4–8.2)
PROT UR STRIP.AUTO-MCNC: ABNORMAL MG/DL
RBC # BLD AUTO: 4.16 M/UL (ref 4–5.2)
RBC #/AREA URNS HPF: NORMAL /HPF (ref 0–4)
SODIUM SERPL-SCNC: 142 MMOL/L (ref 136–145)
SP GR UR STRIP.AUTO: 1.01 (ref 1–1.03)
UA COMPLETE W REFLEX CULTURE PNL UR: ABNORMAL
UA DIPSTICK W REFLEX MICRO PNL UR: YES
URN SPEC COLLECT METH UR: ABNORMAL
UROBILINOGEN UR STRIP-ACNC: 1 E.U./DL
WBC # BLD AUTO: 3.9 K/UL (ref 4–11)
WBC #/AREA URNS HPF: NORMAL /HPF (ref 0–5)

## 2023-07-12 PROCEDURE — 80053 COMPREHEN METABOLIC PANEL: CPT

## 2023-07-12 PROCEDURE — 83690 ASSAY OF LIPASE: CPT

## 2023-07-12 PROCEDURE — 36415 COLL VENOUS BLD VENIPUNCTURE: CPT

## 2023-07-12 PROCEDURE — 85025 COMPLETE CBC W/AUTO DIFF WBC: CPT

## 2023-07-12 PROCEDURE — 96374 THER/PROPH/DIAG INJ IV PUSH: CPT

## 2023-07-12 PROCEDURE — 84703 CHORIONIC GONADOTROPIN ASSAY: CPT

## 2023-07-12 PROCEDURE — 6370000000 HC RX 637 (ALT 250 FOR IP): Performed by: EMERGENCY MEDICINE

## 2023-07-12 PROCEDURE — 74177 CT ABD & PELVIS W/CONTRAST: CPT

## 2023-07-12 PROCEDURE — 6360000002 HC RX W HCPCS: Performed by: EMERGENCY MEDICINE

## 2023-07-12 PROCEDURE — 6360000004 HC RX CONTRAST MEDICATION: Performed by: EMERGENCY MEDICINE

## 2023-07-12 PROCEDURE — 99285 EMERGENCY DEPT VISIT HI MDM: CPT

## 2023-07-12 PROCEDURE — 81001 URINALYSIS AUTO W/SCOPE: CPT

## 2023-07-12 RX ORDER — CIPROFLOXACIN 500 MG/1
500 TABLET, FILM COATED ORAL 2 TIMES DAILY
Qty: 14 TABLET | Refills: 0 | Status: SHIPPED | OUTPATIENT
Start: 2023-07-12 | End: 2023-07-19

## 2023-07-12 RX ORDER — CIPROFLOXACIN 500 MG/1
500 TABLET, FILM COATED ORAL 2 TIMES DAILY
Qty: 14 TABLET | Refills: 0 | Status: SHIPPED | OUTPATIENT
Start: 2023-07-12 | End: 2023-07-12 | Stop reason: SDUPTHER

## 2023-07-12 RX ORDER — METRONIDAZOLE 500 MG/1
500 TABLET ORAL 2 TIMES DAILY
Qty: 14 TABLET | Refills: 0 | Status: SHIPPED | OUTPATIENT
Start: 2023-07-12 | End: 2023-07-12 | Stop reason: SDUPTHER

## 2023-07-12 RX ORDER — AMOXICILLIN AND CLAVULANATE POTASSIUM 875; 125 MG/1; MG/1
1 TABLET, FILM COATED ORAL 2 TIMES DAILY
Qty: 14 TABLET | Refills: 0 | Status: SHIPPED | OUTPATIENT
Start: 2023-07-12 | End: 2023-07-12 | Stop reason: SINTOL

## 2023-07-12 RX ORDER — METRONIDAZOLE 500 MG/1
500 TABLET ORAL 2 TIMES DAILY
Qty: 14 TABLET | Refills: 0 | Status: SHIPPED | OUTPATIENT
Start: 2023-07-12 | End: 2023-07-19

## 2023-07-12 RX ORDER — METRONIDAZOLE 500 MG/1
500 TABLET ORAL ONCE
Status: COMPLETED | OUTPATIENT
Start: 2023-07-12 | End: 2023-07-12

## 2023-07-12 RX ORDER — AMOXICILLIN AND CLAVULANATE POTASSIUM 875; 125 MG/1; MG/1
1 TABLET, FILM COATED ORAL ONCE
Status: COMPLETED | OUTPATIENT
Start: 2023-07-12 | End: 2023-07-12

## 2023-07-12 RX ORDER — OXYCODONE HYDROCHLORIDE 5 MG/1
5 TABLET ORAL ONCE
Status: COMPLETED | OUTPATIENT
Start: 2023-07-12 | End: 2023-07-12

## 2023-07-12 RX ORDER — ONDANSETRON 2 MG/ML
4 INJECTION INTRAMUSCULAR; INTRAVENOUS ONCE
Status: COMPLETED | OUTPATIENT
Start: 2023-07-12 | End: 2023-07-12

## 2023-07-12 RX ORDER — CIPROFLOXACIN 500 MG/1
500 TABLET, FILM COATED ORAL ONCE
Status: COMPLETED | OUTPATIENT
Start: 2023-07-12 | End: 2023-07-12

## 2023-07-12 RX ADMIN — METRONIDAZOLE 500 MG: 500 TABLET ORAL at 21:55

## 2023-07-12 RX ADMIN — IOPAMIDOL 75 ML: 755 INJECTION, SOLUTION INTRAVENOUS at 20:37

## 2023-07-12 RX ADMIN — ONDANSETRON 4 MG: 2 INJECTION INTRAMUSCULAR; INTRAVENOUS at 19:47

## 2023-07-12 RX ADMIN — AMOXICILLIN AND CLAVULANATE POTASSIUM 1 TABLET: 875; 125 TABLET, FILM COATED ORAL at 21:31

## 2023-07-12 RX ADMIN — OXYCODONE HYDROCHLORIDE 5 MG: 5 TABLET ORAL at 21:31

## 2023-07-12 RX ADMIN — CIPROFLOXACIN 500 MG: 500 TABLET, FILM COATED ORAL at 21:54

## 2023-07-12 ASSESSMENT — PAIN DESCRIPTION - LOCATION: LOCATION: ABDOMEN

## 2023-07-12 ASSESSMENT — PAIN SCALES - GENERAL
PAINLEVEL_OUTOF10: 8
PAINLEVEL_OUTOF10: 8

## 2023-07-12 ASSESSMENT — PAIN - FUNCTIONAL ASSESSMENT: PAIN_FUNCTIONAL_ASSESSMENT: 0-10

## 2023-07-31 ENCOUNTER — HOSPITAL ENCOUNTER (OUTPATIENT)
Dept: GENERAL RADIOLOGY | Age: 33
Discharge: HOME | End: 2023-07-31
Payer: MEDICARE

## 2023-07-31 DIAGNOSIS — M54.50 LOW BACK PAIN, UNSPECIFIED BACK PAIN LATERALITY, UNSPECIFIED CHRONICITY, UNSPECIFIED WHETHER SCIATICA PRESENT: ICD-10-CM

## 2023-07-31 DIAGNOSIS — M25.40 PAINFUL SWELLING OF JOINT: ICD-10-CM

## 2023-07-31 DIAGNOSIS — M25.50 ARTHRALGIA, UNSPECIFIED JOINT: ICD-10-CM

## 2023-07-31 DIAGNOSIS — M32.9 SYSTEMIC LUPUS ERYTHEMATOSUS, UNSPECIFIED SLE TYPE, UNSPECIFIED ORGAN INVOLVEMENT STATUS (HCC): ICD-10-CM

## 2023-07-31 PROCEDURE — 73130 X-RAY EXAM OF HAND: CPT

## 2023-07-31 PROCEDURE — 73522 X-RAY EXAM HIPS BI 3-4 VIEWS: CPT

## 2023-08-15 ENCOUNTER — TELEPHONE (OUTPATIENT)
Dept: ADMINISTRATIVE | Age: 33
End: 2023-08-15

## 2023-08-15 NOTE — TELEPHONE ENCOUNTER
Submitted PA for AIMOVIG  Via CM Gonzalez: Melisa Cason STATUS: PENDING. Follow up done daily; if no response in three days we will refax for status check. If another three days goes by with no response we will call the insurance for status.

## 2023-09-19 ENCOUNTER — OFFICE VISIT (OUTPATIENT)
Dept: INTERNAL MEDICINE CLINIC | Age: 33
End: 2023-09-19
Payer: MEDICARE

## 2023-09-19 VITALS
HEIGHT: 66 IN | BODY MASS INDEX: 23.14 KG/M2 | WEIGHT: 144 LBS | DIASTOLIC BLOOD PRESSURE: 68 MMHG | SYSTOLIC BLOOD PRESSURE: 118 MMHG

## 2023-09-19 DIAGNOSIS — R11.2 NAUSEA AND VOMITING, UNSPECIFIED VOMITING TYPE: Primary | ICD-10-CM

## 2023-09-19 DIAGNOSIS — M32.9 SYSTEMIC LUPUS ERYTHEMATOSUS, UNSPECIFIED SLE TYPE, UNSPECIFIED ORGAN INVOLVEMENT STATUS (HCC): ICD-10-CM

## 2023-09-19 DIAGNOSIS — J45.41 MODERATE PERSISTENT ASTHMA WITH ACUTE EXACERBATION: ICD-10-CM

## 2023-09-19 PROCEDURE — G8427 DOCREV CUR MEDS BY ELIG CLIN: HCPCS | Performed by: INTERNAL MEDICINE

## 2023-09-19 PROCEDURE — 1036F TOBACCO NON-USER: CPT | Performed by: INTERNAL MEDICINE

## 2023-09-19 PROCEDURE — 99214 OFFICE O/P EST MOD 30 MIN: CPT | Performed by: INTERNAL MEDICINE

## 2023-09-19 PROCEDURE — G8420 CALC BMI NORM PARAMETERS: HCPCS | Performed by: INTERNAL MEDICINE

## 2023-09-19 RX ORDER — ALBUTEROL SULFATE 2.5 MG/3ML
2.5 SOLUTION RESPIRATORY (INHALATION) EVERY 4 HOURS PRN
Qty: 120 EACH | Refills: 3 | Status: SHIPPED | OUTPATIENT
Start: 2023-09-19

## 2023-09-19 RX ORDER — LIDOCAINE 5% 5 G/100G
CREAM TOPICAL
Qty: 120 G | Refills: 3 | Status: SHIPPED | OUTPATIENT
Start: 2023-09-19

## 2023-09-19 RX ORDER — FLUTICASONE PROPIONATE AND SALMETEROL 250; 50 UG/1; UG/1
1 POWDER RESPIRATORY (INHALATION) EVERY 12 HOURS
Qty: 60 EACH | Refills: 3 | Status: SHIPPED | OUTPATIENT
Start: 2023-09-19

## 2023-09-19 RX ORDER — ONDANSETRON 4 MG/1
TABLET, ORALLY DISINTEGRATING ORAL
Qty: 40 TABLET | Refills: 0 | Status: SHIPPED | OUTPATIENT
Start: 2023-09-19

## 2023-09-19 NOTE — PROGRESS NOTES
Nehemiah High (:  1990) is a 29 y.o. adult,Established patient, here for evaluation of the following chief complaint(s):  Asthma (Started having tickling throat Thursday, asthma attack Friday, stomach bug sat and sun, )         ASSESSMENT/PLAN:  1. Nausea and vomiting, unspecified vomiting type   -Suspect viral gastroenteritis based on symptoms, within typical time course of illness. Recommend ondansetron as needed, if not improving as expected will need further work-up  2. Moderate persistent asthma with acute exacerbation   -No wheezing on exam, good air movement, but having significant symptoms. Previously did well with Advair, may be able to stepdown later on to steroid only inhaler but for now we will continue ICS/LABA  3. Systemic lupus erythematosus, unspecified SLE type, unspecified organ involvement status ([address])   -Suspect current increase in symptoms is related to acute illness, she will follow-up with her rheumatologist regarding the hydroxychloroquine    Return in about 3 months (around 2023). Subjective   SUBJECTIVE/OBJECTIVE:  HPI    Thurs-Fri had tickle in the throat. Then next day had stomach bug  20 min asthma attack Friday, woke up with asthma attacks a couple nights ago. Has productive cough and wheezing  Still having nausea and vomiting  Some fevers/chills  Arthritis is worse  Took a little prednisone yesterday morning, didn't help just made sleep worse  Last asthma flare like this was in 2017  Currently off of hydroxychloroquine, her new rheumatologist to discuss resuming it    Review of Systems       Objective   Physical Exam  Vitals reviewed. Constitutional:       General: Nehemiah High is not in acute distress. Appearance: Normal appearance. Nehemiah High is well-developed. HENT:      Head: Normocephalic and atraumatic. Cardiovascular:      Rate and Rhythm: Normal rate and regular rhythm. Heart sounds: Normal heart sounds.    Pulmonary:      Effort:

## 2023-11-20 ENCOUNTER — OFFICE VISIT (OUTPATIENT)
Dept: PULMONOLOGY | Age: 33
End: 2023-11-20
Payer: MEDICARE

## 2023-11-20 VITALS
SYSTOLIC BLOOD PRESSURE: 120 MMHG | HEART RATE: 113 BPM | WEIGHT: 149 LBS | BODY MASS INDEX: 23.95 KG/M2 | DIASTOLIC BLOOD PRESSURE: 68 MMHG | HEIGHT: 66 IN | OXYGEN SATURATION: 96 %

## 2023-11-20 DIAGNOSIS — E03.8 OTHER SPECIFIED HYPOTHYROIDISM: ICD-10-CM

## 2023-11-20 DIAGNOSIS — F51.04 PSYCHOPHYSIOLOGICAL INSOMNIA: ICD-10-CM

## 2023-11-20 DIAGNOSIS — G47.33 OSA (OBSTRUCTIVE SLEEP APNEA): Primary | ICD-10-CM

## 2023-11-20 PROCEDURE — G8484 FLU IMMUNIZE NO ADMIN: HCPCS | Performed by: NURSE PRACTITIONER

## 2023-11-20 PROCEDURE — 1036F TOBACCO NON-USER: CPT | Performed by: NURSE PRACTITIONER

## 2023-11-20 PROCEDURE — G8420 CALC BMI NORM PARAMETERS: HCPCS | Performed by: NURSE PRACTITIONER

## 2023-11-20 PROCEDURE — 99214 OFFICE O/P EST MOD 30 MIN: CPT | Performed by: NURSE PRACTITIONER

## 2023-11-20 PROCEDURE — G8427 DOCREV CUR MEDS BY ELIG CLIN: HCPCS | Performed by: NURSE PRACTITIONER

## 2023-11-20 ASSESSMENT — SLEEP AND FATIGUE QUESTIONNAIRES
ESS TOTAL SCORE: 8
HOW LIKELY ARE YOU TO NOD OFF OR FALL ASLEEP WHILE SITTING QUIETLY AFTER LUNCH WITHOUT ALCOHOL: 1
HOW LIKELY ARE YOU TO NOD OFF OR FALL ASLEEP WHILE SITTING INACTIVE IN A PUBLIC PLACE: 0
HOW LIKELY ARE YOU TO NOD OFF OR FALL ASLEEP WHILE SITTING AND TALKING TO SOMEONE: 0
HOW LIKELY ARE YOU TO NOD OFF OR FALL ASLEEP WHILE LYING DOWN TO REST IN THE AFTERNOON WHEN CIRCUMSTANCES PERMIT: 3
HOW LIKELY ARE YOU TO NOD OFF OR FALL ASLEEP WHILE SITTING AND READING: 2
HOW LIKELY ARE YOU TO NOD OFF OR FALL ASLEEP WHEN YOU ARE A PASSENGER IN A CAR FOR AN HOUR WITHOUT A BREAK: 1
HOW LIKELY ARE YOU TO NOD OFF OR FALL ASLEEP WHILE WATCHING TV: 1
HOW LIKELY ARE YOU TO NOD OFF OR FALL ASLEEP IN A CAR, WHILE STOPPED FOR A FEW MINUTES IN TRAFFIC: 0

## 2023-11-20 NOTE — ASSESSMENT & PLAN NOTE
Chronic - Stable but question if fully controlled: Reviewed and analyzed results of physiologic download from patient's machine and reviewed with patients. Supplies and parts as needed for the machine. These are medically necessary. Limit caffeine use after 3 PM. Based on the analyzed data, we will make the following change: P min increased to 13 for sleep maintenance. Will try pressure increase to see if it helps with sleep maintenance as she is still taking her mask off without realizing during sleep. Discussed trial of a nasal steroid spray for nasal congestion. She knows how to adjust humidity settings on the machine. She will return in 6 mo for follow up. She was instructed to return sooner or contact the office if experience new or worsening of symptoms. Encouraged her to continue to use her machine each night. Encouraged the patient to contact the office with any questions or concerns. Discussed the importance of consistence use of the machine and encouraged consistent use of the machine each night. Also discussed the importance of treating Obstructive Sleep Apnea from a physiological standpoint. Instructed not to drive unless had 4 hours of effective therapy for LEOLA the night before. Did review the risks of under or untreated LEOLA including, but not limited to, higher risks of motor vehicle accidents, stroke, heart attacks, and death. Patients verbalized understanding and accepts all these risks.

## 2023-11-20 NOTE — PROGRESS NOTES
and death. Patients verbalized understanding and accepts all these risks. 2. Other specified hypothyroidism  Assessment & Plan:  Chronic- Stable. Discussed the importance of treating sleep apnea as part of the management of this disorder. Cont any meds per PCP and other physicians. 3. Psychophysiological insomnia  Assessment & Plan:   Chronic- Stable. Discussed the importance of treating sleep apnea as part of the management of this disorder. Cont any meds per PCP and other physicians. Continue with trazodone 75 mg nightly for initiating sleep. Medication is managed by her psychiatrist. Denies side effects of medication. Stable on the current regimen. Reviewed, analyzed, and documented physiologic data from patient's PAP machine. This information was analyzed to assess complexity and medical decision making in regards to further testing and management. The primary encounter diagnosis was LEOLA (obstructive sleep apnea). Diagnoses of Other specified hypothyroidism and Psychophysiological insomnia were also pertinent to this visit. The chronic medical conditions listed are directly related to the primary diagnosis listed above. The management of the primary diagnosis affects the secondary diagnosis and vice versa. Subjective:   Subjective   Patient ID: Vivian Holloway is a 29 y.o. adult. Chief Complaint   Patient presents with    Sleep Apnea     HPI:  Machine Modem/Download Info:  Compliance (hours/night): 5 hrs/night  % of nights >= 4 hrs: 52 %  Download AHI (/hour): 0.8 /HR  Average CPAP Pressure : 12 cmH2O        APAP - Settings  Pressure Min: 12 cmH2O  Pressure Max: 20 cmH2O                 Comfort Settings  Flex/EPR (0-3): 3  Mask: Nasal     Vivian Holloway presents today for follow up for sleep apnea. She was not able to use her machine due to moving to a new place. She was also sick from URI, unable to use her machine due to congestion issues.  Other than this, she has been doing well

## 2023-11-20 NOTE — ASSESSMENT & PLAN NOTE
Chronic- Stable. Discussed the importance of treating sleep apnea as part of the management of this disorder. Cont any meds per PCP and other physicians. Continue with trazodone 75 mg nightly for initiating sleep. Medication is managed by her psychiatrist. Denies side effects of medication. Stable on the current regimen.

## 2023-12-05 ENCOUNTER — OFFICE VISIT (OUTPATIENT)
Dept: NEUROLOGY | Age: 33
End: 2023-12-05
Payer: MEDICARE

## 2023-12-05 VITALS
HEIGHT: 66 IN | SYSTOLIC BLOOD PRESSURE: 121 MMHG | BODY MASS INDEX: 23.95 KG/M2 | DIASTOLIC BLOOD PRESSURE: 81 MMHG | WEIGHT: 149 LBS | HEART RATE: 110 BPM

## 2023-12-05 DIAGNOSIS — G43.119 INTRACTABLE MIGRAINE WITH AURA WITHOUT STATUS MIGRAINOSUS: ICD-10-CM

## 2023-12-05 PROCEDURE — G8427 DOCREV CUR MEDS BY ELIG CLIN: HCPCS | Performed by: PSYCHIATRY & NEUROLOGY

## 2023-12-05 PROCEDURE — 1036F TOBACCO NON-USER: CPT | Performed by: PSYCHIATRY & NEUROLOGY

## 2023-12-05 PROCEDURE — G8420 CALC BMI NORM PARAMETERS: HCPCS | Performed by: PSYCHIATRY & NEUROLOGY

## 2023-12-05 PROCEDURE — G8484 FLU IMMUNIZE NO ADMIN: HCPCS | Performed by: PSYCHIATRY & NEUROLOGY

## 2023-12-05 PROCEDURE — 99213 OFFICE O/P EST LOW 20 MIN: CPT | Performed by: PSYCHIATRY & NEUROLOGY

## 2023-12-05 RX ORDER — ERENUMAB-AOOE 140 MG/ML
INJECTION, SOLUTION SUBCUTANEOUS
Qty: 140 ML | Refills: 5 | Status: SHIPPED | OUTPATIENT
Start: 2023-12-05

## 2023-12-05 RX ORDER — RIMEGEPANT SULFATE 75 MG/75MG
TABLET, ORALLY DISINTEGRATING ORAL
Qty: 8 TABLET | Refills: 1 | Status: SHIPPED | OUTPATIENT
Start: 2023-12-05

## 2023-12-05 NOTE — PROGRESS NOTES
midline. Motor exam shows normal symmetrical strength. Deep tendon reflexes normal. Plantar reflexes downgoing. Sensory exam normal. Coordination normal. Gait normal. No carotid bruit. No neck stiffness. Data :  LABS:  General Labs:    CBC:   Lab Results   Component Value Date/Time    WBC 3.9 07/12/2023 07:39 PM    RBC 4.16 07/12/2023 07:39 PM    HGB 10.8 07/12/2023 07:39 PM    HCT 32.9 07/12/2023 07:39 PM    MCV 79.1 07/12/2023 07:39 PM    MCH 25.9 07/12/2023 07:39 PM    MCHC 32.8 07/12/2023 07:39 PM    RDW 17.7 07/12/2023 07:39 PM     07/12/2023 07:39 PM    MPV 8.4 07/12/2023 07:39 PM     BMP:    Lab Results   Component Value Date/Time     07/12/2023 07:39 PM    K 3.7 07/12/2023 07:39 PM     07/12/2023 07:39 PM    CO2 27 07/12/2023 07:39 PM    BUN 16 07/12/2023 07:39 PM    LABALBU 4.1 07/12/2023 07:39 PM    CREATININE 0.8 07/12/2023 07:39 PM    CALCIUM 8.9 07/12/2023 07:39 PM    GFRAA >60 08/22/2022 07:02 PM    GFRAA >60 08/31/2011 08:10 PM    LABGLOM >60 07/12/2023 07:39 PM    GLUCOSE 104 07/12/2023 07:39 PM       Impression :  Migraine headaches, reasonable control  Fibromyalgia  Patient states that she has been tried on several preventative medications for migraines in the past including topiramate amitriptyline and Botox injections without much improvement  Obstructive sleep apnea    Plan :  Discussed with patient  Continue Aimovig 140 mg. She will take it subcutaneously every 30 days. Continue Nurtec #8 to be taken on a as needed basis. We discussed about possibility of teratogenic side effects with these medications. Recommended that she stop this medications if she plans to get pregnant. Continue CPAP for sleep apnea  Return in 6 months        Please note a portion of  this chart was generated using dragon dictation software. Although every effort was made to ensure the accuracy of this automated transcription, some errors in transcription may have occurred.

## 2023-12-21 PROBLEM — S13.4XXD WHIPLASH INJURIES, SUBSEQUENT ENCOUNTER: Status: ACTIVE | Noted: 2023-12-21

## 2024-01-03 RX ORDER — LEVOTHYROXINE SODIUM 0.05 MG/1
TABLET ORAL
Qty: 90 TABLET | Refills: 3 | OUTPATIENT
Start: 2024-01-03

## 2024-02-27 ENCOUNTER — APPOINTMENT (OUTPATIENT)
Age: 34
End: 2024-02-27
Payer: MEDICARE

## 2024-02-27 ENCOUNTER — HOSPITAL ENCOUNTER (EMERGENCY)
Age: 34
Discharge: HOME | End: 2024-02-28
Attending: EMERGENCY MEDICINE
Payer: MEDICARE

## 2024-02-27 DIAGNOSIS — O20.0 THREATENED MISCARRIAGE: Primary | ICD-10-CM

## 2024-02-27 LAB
ABO + RH BLD: NORMAL
B-HCG SERPL EIA 3RD IS-ACNC: NORMAL MIU/ML
BASOPHILS # BLD: 0.01 K/UL (ref 0–0.2)
BASOPHILS NFR BLD: 0 %
BILIRUB UR QL STRIP: NEGATIVE
CLARITY UR: CLEAR
COLOR UR: YELLOW
EOSINOPHIL # BLD: 0.07 K/UL (ref 0–0.6)
EOSINOPHILS RELATIVE PERCENT: 2 %
EPI CELLS #/AREA URNS HPF: NORMAL /HPF
ERYTHROCYTE [DISTWIDTH] IN BLOOD BY AUTOMATED COUNT: 16.2 % (ref 12.4–15.4)
GLUCOSE UR STRIP-MCNC: NEGATIVE MG/DL
HCG SERPL QL: POSITIVE
HCT VFR BLD AUTO: 31.5 % (ref 36–48)
HGB BLD-MCNC: 10.5 G/DL (ref 12–16)
HGB UR QL STRIP.AUTO: NEGATIVE
IMM GRANULOCYTES # BLD AUTO: 0.04 K/UL (ref 0–0.5)
IMM GRANULOCYTES NFR BLD: 1 %
KETONES UR STRIP-MCNC: NEGATIVE MG/DL
LEUKOCYTE ESTERASE UR QL STRIP: NEGATIVE
LYMPHOCYTES NFR BLD: 1.26 K/UL (ref 1–5.1)
LYMPHOCYTES RELATIVE PERCENT: 27 %
MCH RBC QN AUTO: 26.6 PG (ref 26–34)
MCHC RBC AUTO-ENTMCNC: 33.3 G/DL (ref 31–36)
MCV RBC AUTO: 79.7 FL (ref 80–100)
MONOCYTES NFR BLD: 0.4 K/UL (ref 0–1.3)
MONOCYTES NFR BLD: 9 %
NEUTROPHILS NFR BLD: 62 %
NEUTS SEG NFR BLD: 2.95 K/UL (ref 1.7–7.7)
NITRITE UR QL STRIP: NEGATIVE
PH UR STRIP: 6 [PH] (ref 5–8)
PLATELET # BLD AUTO: 223 K/UL (ref 135–450)
PMV BLD AUTO: 9.8 FL (ref 9.4–12.4)
PROT UR STRIP-MCNC: NEGATIVE MG/DL
RBC # BLD AUTO: 3.95 M/UL (ref 4–5.2)
RBC #/AREA URNS HPF: NORMAL /HPF
SP GR UR STRIP: 1.02 (ref 1–1.03)
UROBILINOGEN UR STRIP-ACNC: 0.2 EU/DL (ref 0–1)
WBC #/AREA URNS HPF: NORMAL /HPF

## 2024-02-27 PROCEDURE — 84702 CHORIONIC GONADOTROPIN TEST: CPT

## 2024-02-27 PROCEDURE — 81001 URINALYSIS AUTO W/SCOPE: CPT

## 2024-02-27 PROCEDURE — 84703 CHORIONIC GONADOTROPIN ASSAY: CPT

## 2024-02-27 PROCEDURE — 6370000000 HC RX 637 (ALT 250 FOR IP): Performed by: EMERGENCY MEDICINE

## 2024-02-27 PROCEDURE — 2580000003 HC RX 258: Performed by: EMERGENCY MEDICINE

## 2024-02-27 PROCEDURE — 76801 OB US < 14 WKS SINGLE FETUS: CPT

## 2024-02-27 PROCEDURE — 99284 EMERGENCY DEPT VISIT MOD MDM: CPT

## 2024-02-27 PROCEDURE — 85025 COMPLETE CBC W/AUTO DIFF WBC: CPT

## 2024-02-27 PROCEDURE — 86900 BLOOD TYPING SEROLOGIC ABO: CPT

## 2024-02-27 PROCEDURE — 86901 BLOOD TYPING SEROLOGIC RH(D): CPT

## 2024-02-27 RX ORDER — ACETAMINOPHEN 500 MG
1000 TABLET ORAL ONCE
Status: COMPLETED | OUTPATIENT
Start: 2024-02-27 | End: 2024-02-27

## 2024-02-27 RX ORDER — 0.9 % SODIUM CHLORIDE 0.9 %
1000 INTRAVENOUS SOLUTION INTRAVENOUS ONCE
Status: COMPLETED | OUTPATIENT
Start: 2024-02-27 | End: 2024-02-27

## 2024-02-27 RX ADMIN — SODIUM CHLORIDE 1000 ML: 9 INJECTION, SOLUTION INTRAVENOUS at 20:24

## 2024-02-27 RX ADMIN — ACETAMINOPHEN 1000 MG: 500 TABLET ORAL at 22:33

## 2024-02-27 ASSESSMENT — PAIN SCALES - GENERAL
PAINLEVEL_OUTOF10: 5
PAINLEVEL_OUTOF10: 3
PAINLEVEL_OUTOF10: 5

## 2024-02-27 ASSESSMENT — PAIN - FUNCTIONAL ASSESSMENT
PAIN_FUNCTIONAL_ASSESSMENT: ACTIVITIES ARE NOT PREVENTED
PAIN_FUNCTIONAL_ASSESSMENT: 0-10

## 2024-02-27 ASSESSMENT — PAIN DESCRIPTION - FREQUENCY
FREQUENCY: CONTINUOUS
FREQUENCY: CONTINUOUS

## 2024-02-27 ASSESSMENT — LIFESTYLE VARIABLES
HOW OFTEN DO YOU HAVE A DRINK CONTAINING ALCOHOL: NEVER
HOW MANY STANDARD DRINKS CONTAINING ALCOHOL DO YOU HAVE ON A TYPICAL DAY: PATIENT DOES NOT DRINK

## 2024-02-27 ASSESSMENT — PAIN DESCRIPTION - ORIENTATION
ORIENTATION: LOWER;MID
ORIENTATION: MID;LOWER
ORIENTATION: MID;LOWER

## 2024-02-27 ASSESSMENT — PAIN DESCRIPTION - PAIN TYPE
TYPE: ACUTE PAIN
TYPE: ACUTE PAIN

## 2024-02-27 ASSESSMENT — PAIN DESCRIPTION - DESCRIPTORS
DESCRIPTORS: CRAMPING
DESCRIPTORS: CRAMPING;ACHING;STABBING
DESCRIPTORS: CRAMPING

## 2024-02-27 ASSESSMENT — PAIN DESCRIPTION - LOCATION
LOCATION: ABDOMEN

## 2024-02-28 VITALS
SYSTOLIC BLOOD PRESSURE: 121 MMHG | DIASTOLIC BLOOD PRESSURE: 80 MMHG | RESPIRATION RATE: 20 BRPM | HEIGHT: 66 IN | TEMPERATURE: 98.2 F | OXYGEN SATURATION: 100 % | BODY MASS INDEX: 24.91 KG/M2 | WEIGHT: 155 LBS | HEART RATE: 82 BPM

## 2024-02-28 NOTE — DISCHARGE INSTRUCTIONS
Return to ED if worsening or recurrent abdominal pain, vaginal bleeding, worse in any way or any problems.    Rest as much as possible. Pelvic rest -ie  no tampons or sex until cleared by your obstetrician    Force fluids. Drink at least 8-8oz glasses of water daily

## 2024-02-28 NOTE — ED NOTES
Patient transported to ultrasound in cart with tech.  Respirations easy and unlabored.  No signs of distress noted.

## 2024-02-28 NOTE — ED TRIAGE NOTES
Pt ambulates into the ER from home with complaint of middle lower abd pain in pregnancy. Pt reports symptoms started 7pm yesterday. Pt is 13 weeks pregnant and follows with UC for pregnancy. Pt reports previous miscarriages. Pt denies vaginal bleeding and back pain.  Pt took tylenol 1,000mg at 1pm without relief. Pt is A&OX4. Respirations are even and unlabored. Skin is warm, appropriate for ethnicity, and dry. No acute distress noted.

## 2024-02-28 NOTE — ED PROVIDER NOTES
[facility] EMERGENCY DEPT     EMERGENCY DEPARTMENT ENCOUNTER            Pt Name: Milka Mcbride   MRN: [id number]   Birthdate 1990   Date of evaluation: 2024   Provider: Bailey Leyva DO   PCP: Manisha Garibay MD   Note Started: 8:30 PM EST 24          CHIEF COMPLAINT     Chief Complaint   Patient presents with    Abdominal Pain             HISTORY OF PRESENT ILLNESS:   History from : Patient   Limitations to history : None     Milka Mcbride is a 33 y.o. adult who presents to ED with c/o suprapubic abdominal pain onset 25 hours prior to admission.Pt weeks 13 weeks pregnant. She is .  She has had 5 miscarraiges.  Patient with past medical history of lupus and antiphospholipid antibody syndrome.  She is currently on Lovenox.  She denies vaginal discharge vaginal bleeding or any urinary tract symptoms whatsoever.  She describes the pain as sharp and crampy in nature.  She denies radiation of pain.  Nothing makes it better or worse.  She states she had been eating and drinking well.  She denies fever.  She denies any other abdominal pain back pain chest pain difficulty breathing nausea vomiting diarrhea.  Patient did have transvaginal ultrasound as ordered by her obstetrician on 2024 which revealed an IUP at 9 weeks 0 days and an HOWARD of 9/3/2024.  There is also a simple cyst on the right ovary at that time.    Nursing Notes were all reviewed and agreed with, or any disagreements were addressed in the HPI.     REVIEW OF SYSTEMS :    Positives and Pertinent negatives as per HPI.      MEDICAL HISTORY   has a past medical history of Anxiety, Asthma, Bulimia, Depression, Environmental allergies, Fibromyalgia (3/17/2021), Fibromyalgia syndrome, LEONEL (generalized anxiety disorder) (3/17/2021), Gestational hypertension, antepartum, High risk medication use (10/5/2021), Lupus (HCC), LEOLA (obstructive sleep apnea) (2021), Other specified hypothyroidism (3/17/2021), and Seizures ([facility]).

## 2024-02-28 NOTE — ED NOTES
Patient discharged to home in good condition. Vital signs remain stable and within normal limits. Was provided with copy of discharge instructions and all questions were answered. Follow up care was explained to patient and they expressed agreement with the treatment plan for follow up. Respirations remain even and unlabored. No acute distress is noted.

## 2024-02-28 NOTE — DISCHARGE INSTR - COC
List:[id number]}    Rehab Therapies: {THERAPEUTIC INTERVENTION:4471272063}  Weight Bearing Status/Restrictions: { CC Weight Bearin}  Other Medical Equipment (for information only, NOT a DME order):  {EQUIPMENT:875654768}  Other Treatments: ***    Patient's personal belongings (please select all that are sent with patient):  {CHP DME Belongings:082491257}    RN SIGNATURE:  {Esignature:134262108}    CASE MANAGEMENT/SOCIAL WORK SECTION    Inpatient Status Date: ***    Readmission Risk Assessment Score:  Readmission Risk              Risk of Unplanned Readmission:  0           Discharging to Facility/ Agency   Name:   Address:  Phone:  Fax:    Dialysis Facility (if applicable)   Name:  Address:  Dialysis Schedule:  Phone:  Fax:    / signature: {Esignature:237381122}    PHYSICIAN SECTION    Prognosis: {Prognosis:5521472933}    Condition at Discharge: { Patient Condition:398405075}    Rehab Potential (if transferring to Rehab): {Prognosis:3677701261}    Recommended Labs or Other Treatments After Discharge: ***    Physician Certification: I certify the above information and transfer of Milka Mcbride  is necessary for the continuing treatment of the diagnosis listed and that Milka Mcbride requires {Admit to Appropriate Level of Care:81360} for {GREATER/LESS:269922775} 30 days.     Update Admission H&P: {CHP DME Changes in HandP:600930579}    PHYSICIAN SIGNATURE:  {Esignature:226051068}

## 2024-03-20 NOTE — ASSESSMENT & PLAN NOTE
Chronic-Stable: Reviewed and analyzed results of physiologic download from patient's machine and reviewed with patient. Supplies and parts as needed for [employer]. These are medically necessary. Limit caffeine use after 3pm. Based on the analyzed data will continue with current settings. Discussed more consistent use of her machine each night to assist in control of Excessive daytime sleepiness. Discussed adjusting the moisture settings on her machine, trying a nasal steroid, and/or switching to a FFM to help with nasal congestion. Continue working with psychiatry. Will see back in 6 months or sooner if issues arise. Plastic Surgery Home Care Instructions      We hope you were pleased with your care at [facility].  We wish you the best outcome and overall experience with your operation.  These instructions will help to minimize pain, optimize healing, and improve the likelihood of a successful result.    What To Expect  There will be some spotting of the incision lines for the next few days.  Your breasts will be swollen and might feel congested for the next 1-2 weeks  Please DO NOT apply heat or ice to the affected area  Temporary areas of numbness are typical in the early weeks following a breast procedure.  Normalization of sensation can typically take up to several months following the operation.    Bandages (Dressing)  Keep dressings clean and dry  Do not remove your bra unless for hygiene purposes - compression is key - especially at night. You can change to a supportive sports bra or camilsole if this provides good compression and you are more comfortable in that rather than the surgical bra. No underwire.   You are to wear your bra 24/7 for 6 weeks post-operatively.   Reinforce the dressing with insertion of additional padding as needed - this is not required - for your comfort only  You can change the drain dressings if you need to (if they get wet). You will be given extra supplies from the hospital.     Drain Care  Proper drain care is an important part of your home care and will help to prevent fluid collection, minimize the risk for infection, and increase the success of your breast reconstruction.  Empty your drains at 8 am and 8 pm each day  DO NOT GET DRAIN SITES WET  Record each drain separately and use the drain sheet given to you to record the drainage. Follow the instructions on the drain sheet.  Wash your hands before and after emptying your drains  Bring drain sheet with you to your first office visit    Bathing/Showers  You will needs to sponge bathe until your drains are removed. You may shower, but  only from the waist down. Before shower, take out all dressings from bra. Reinforce drain sites with additional waterproof dressings if needed. These will be given to you by the hospital. If some water gets underneath the dressing during the shower, just replace with a new dry waterproof dressing.   DO NOT GET DRAIN SITES WET  No baths, swimming, or hot tubs until you receive medical permission    Pain Medication: Norco  Take one or two tablets every six hours as needed for pain.  Do not take narcotics, if you do not have pain.  Keep stools soft.  Take a stool softener (Metamucil, Milk of Magnesia, Colace).  Eating fruit will also help to prevent constipation    Antibiotics: Keflex 4x/day until your drains come out  Antibiotics will help minimize the risk of a wound infection  Please note the refills on this prescription. If your drains are kept in after you finish the first course of the antibiotic, you need to continue refilling this until your drains are completely removed.   Fill the prescription as directed   Follow the instructions as written on the bottle's label  Call our office if you experience nausea, rash, or other symptoms which might be a possible side effect.    Over-The-Counter Medication  Non-prescription anti-inflammatory medications can also help to ease the pain.  You can take Aleve or ibuprofen starting 72 hours after surgery  Take as directed on the bottle  Drink a full glass of water with the medication    Home Medication  Resume your home medications as instructed  Do not resume herbal medications for two weeks    Diet  Resume your normal diet    Activity  No strenuous activity or heavy lifting (no lifting over 10 pounds)  No activities that involve your pectoralis muscles (no planks, push-ups, etc)  You can go up and down the stairs as tolerated.   You cannot return to work, if your work requires strenuous activity.  You cannot return to physical exercise, sports, or gym workouts until you  are allowed to participate in strenuous activity.    Driving  Do not drive, if you are taking pain medication.    Return to Work or School  You can return to work when you are not taking pain medication, if your work does not involve strenuous activity.  Contact the office, if you need a medical note.     Follow-up Appointment   Our office will call you to set up a time  Call the office for an appointment in two days if you cannot remember the appointment details.    Verify your appointment date, day, time, and location.  At your 1st postoperative office visit:  Your drains will be examined, wounds will be evaluated, healing assessed, and any additional concerns and instructions will be discussed.    Questions or Concerns  Call Dr. Alonzo's office if you experience sudden swelling of the breast or purple/red/black discoloration of the breast.     Harleen   Thank you for coming to [facility] for your operation.  The nurses and the anesthesiologist try very hard to make sure you receive the best care possible.  Your trust in them is greatly appreciated.    Thanks so much,  Dr. Clinton Mensah, COLBY-PATRICIA Louis RN       Caring for a Closed Suction Drainage Tube  A drainage tube removes fluid from around an incision. This helps prevent infection and promotes healing. The collection bulb at the end of the tube is squeezed and plugged to create suction. The bulb should be emptied and reset when half full to maintain adequate suction. You need to empty the bulb and clean the skin around the drain as often as your healthcare provider tells you to. Follow the steps you were taught in the hospital, including how to measure and keep track of how much fluid is coming out of the drain, or how to flush the tube if needed. Follow your healthcare team's specific instructions.         Supplies  Have the following items ready:  Disposable gloves  Measuring cup  Record sheet  Gauze or paper towel  Sterile cotton swabs or 4-inch  x 4-inch gauze pads  Sterile saline or soap and water  When to call your healthcare provider  Call your healthcare provider if you notice any of these changes:  The amount of fluid increases or decreases suddenly  Large amount of blood or a clot in drainage  Color, odor, or thickness of the fluid changes  Tube falls out or the incision opens  The stitch that holds the drain in place falls out, or is no longer attached to the drain.  Skin around the drain is red, swollen, painful, or seeping pus  You have a fever of 100.4°F ( 38°C ) or higher, or as directed by your healthcare provider  Chills  Tips  Here are tips to drain the tube:  Uncurl any kinks in the tube.  With one hand, firmly hold the base of the tube between your thumb and index finger. Don't touch the incision.  Put the thumb and index finger of your other hand on the tube, next to the first hand. Pinch your fingers together. Then pull them along the tube toward the bag. This will help push any clogged fluid through the tube. This is called stripping the tube. You may find it helpful to hold an alcohol swab between your fingers and the tube to lubricate the tubing.  If the tube still does not drain, call your healthcare provider.  Ben last reviewed this educational content on 3/1/2022  © 5580-2054 [employer]. All rights reserved. This information is not intended as a substitute for professional medical care. Always follow your healthcare professional's instructions.

## 2024-04-01 ENCOUNTER — APPOINTMENT (OUTPATIENT)
Age: 34
End: 2024-04-01
Payer: MEDICARE

## 2024-04-01 ENCOUNTER — HOSPITAL ENCOUNTER (EMERGENCY)
Age: 34
Discharge: HOME | End: 2024-04-01
Attending: EMERGENCY MEDICINE
Payer: MEDICARE

## 2024-04-01 VITALS
WEIGHT: 153 LBS | TEMPERATURE: 98.6 F | HEIGHT: 66 IN | OXYGEN SATURATION: 98 % | RESPIRATION RATE: 16 BRPM | DIASTOLIC BLOOD PRESSURE: 76 MMHG | SYSTOLIC BLOOD PRESSURE: 116 MMHG | BODY MASS INDEX: 24.59 KG/M2 | HEART RATE: 89 BPM

## 2024-04-01 DIAGNOSIS — R51.9 NONINTRACTABLE EPISODIC HEADACHE, UNSPECIFIED HEADACHE TYPE: ICD-10-CM

## 2024-04-01 DIAGNOSIS — R55 SYNCOPE AND COLLAPSE: Primary | ICD-10-CM

## 2024-04-01 DIAGNOSIS — Z3A.17 17 WEEKS GESTATION OF PREGNANCY: ICD-10-CM

## 2024-04-01 DIAGNOSIS — E86.0 DEHYDRATION: ICD-10-CM

## 2024-04-01 LAB
ABO + RH BLD: NORMAL
ALBUMIN SERPL-MCNC: 3.8 G/DL (ref 3.4–5)
ALBUMIN/GLOB SERPL: 1.7 {RATIO}
ALP SERPL-CCNC: 66 U/L (ref 40–129)
ALT SERPL-CCNC: 15 U/L (ref 10–40)
ANION GAP SERPL CALCULATED.3IONS-SCNC: 11 MMOL/L (ref 3–16)
AST SERPL-CCNC: 19 U/L (ref 15–37)
BACTERIA URNS QL MICRO: ABNORMAL
BASOPHILS # BLD: 0.02 K/UL (ref 0–0.2)
BASOPHILS NFR BLD: 0 %
BILIRUB SERPL-MCNC: <0.2 MG/DL (ref 0–1)
BILIRUB UR QL STRIP: NEGATIVE
BUN SERPL-MCNC: 9 MG/DL (ref 7–20)
CALCIUM SERPL-MCNC: 8.9 MG/DL (ref 8.3–10.6)
CHLORIDE SERPL-SCNC: 104 MMOL/L (ref 99–110)
CLARITY UR: ABNORMAL
CO2 SERPL-SCNC: 21 MMOL/L (ref 21–32)
COLOR UR: YELLOW
CREAT SERPL-MCNC: 0.5 MG/DL (ref 0.5–1)
D DIMER PPP FEU-MCNC: 0.44 UG/ML FEU (ref 0–0.6)
EOSINOPHIL # BLD: 0.15 K/UL (ref 0–0.6)
EOSINOPHILS RELATIVE PERCENT: 2 %
EPI CELLS #/AREA URNS HPF: ABNORMAL /HPF
ERYTHROCYTE [DISTWIDTH] IN BLOOD BY AUTOMATED COUNT: 15.7 % (ref 12.4–15.4)
GFR SERPL CREATININE-BSD FRML MDRD: >90 ML/MIN/1.73M2
GLUCOSE BLD-MCNC: 88 MG/DL (ref 70–99)
GLUCOSE SERPL-MCNC: 88 MG/DL (ref 70–99)
GLUCOSE UR STRIP-MCNC: NEGATIVE MG/DL
HCT VFR BLD AUTO: 30.5 % (ref 36–48)
HGB BLD-MCNC: 10.2 G/DL (ref 12–16)
HGB UR QL STRIP.AUTO: NEGATIVE
IMM GRANULOCYTES # BLD AUTO: 0.1 K/UL (ref 0–0.5)
IMM GRANULOCYTES NFR BLD: 1 %
KETONES UR STRIP-MCNC: 40 MG/DL
LACTATE BLDV-SCNC: 0.5 MMOL/L (ref 0.4–2)
LEUKOCYTE ESTERASE UR QL STRIP: ABNORMAL
LYMPHOCYTES NFR BLD: 1.1 K/UL (ref 1–5.1)
LYMPHOCYTES RELATIVE PERCENT: 15 %
MCH RBC QN AUTO: 26.4 PG (ref 26–34)
MCHC RBC AUTO-ENTMCNC: 33.4 G/DL (ref 31–36)
MCV RBC AUTO: 78.8 FL (ref 80–100)
MONOCYTES NFR BLD: 0.6 K/UL (ref 0–1.3)
MONOCYTES NFR BLD: 8 %
NEUTROPHILS NFR BLD: 74 %
NEUTS SEG NFR BLD: 5.47 K/UL (ref 1.7–7.7)
NITRITE UR QL STRIP: NEGATIVE
PH UR STRIP: 8 [PH] (ref 5–8)
PLATELET # BLD AUTO: 278 K/UL (ref 135–450)
PMV BLD AUTO: 9.8 FL
POTASSIUM SERPL-SCNC: 3.8 MMOL/L (ref 3.5–5.1)
PROT SERPL-MCNC: 6.1 G/DL (ref 6.4–8.2)
PROT UR STRIP-MCNC: NEGATIVE MG/DL
RBC # BLD AUTO: 3.87 M/UL (ref 4–5.2)
RBC #/AREA URNS HPF: ABNORMAL /HPF
SODIUM SERPL-SCNC: 136 MMOL/L (ref 136–145)
SP GR UR STRIP: 1.01 (ref 1–1.03)
TROPONIN I SERPL HS-MCNC: <6 NG/L (ref 0–14)
UROBILINOGEN UR STRIP-ACNC: 0.2 EU/DL (ref 0–1)
WBC #/AREA URNS HPF: ABNORMAL /HPF
WBC OTHER # BLD: 7.4 K/UL (ref 4–11)

## 2024-04-01 PROCEDURE — 96374 THER/PROPH/DIAG INJ IV PUSH: CPT

## 2024-04-01 PROCEDURE — 96376 TX/PRO/DX INJ SAME DRUG ADON: CPT

## 2024-04-01 PROCEDURE — 70450 CT HEAD/BRAIN W/O DYE: CPT

## 2024-04-01 PROCEDURE — 2580000003 HC RX 258: Performed by: EMERGENCY MEDICINE

## 2024-04-01 PROCEDURE — 99284 EMERGENCY DEPT VISIT MOD MDM: CPT

## 2024-04-01 PROCEDURE — 85025 COMPLETE CBC W/AUTO DIFF WBC: CPT

## 2024-04-01 PROCEDURE — 85379 FIBRIN DEGRADATION QUANT: CPT

## 2024-04-01 PROCEDURE — 72125 CT NECK SPINE W/O DYE: CPT

## 2024-04-01 PROCEDURE — 96375 TX/PRO/DX INJ NEW DRUG ADDON: CPT

## 2024-04-01 PROCEDURE — 83605 ASSAY OF LACTIC ACID: CPT

## 2024-04-01 PROCEDURE — 86900 BLOOD TYPING SEROLOGIC ABO: CPT

## 2024-04-01 PROCEDURE — 93005 ELECTROCARDIOGRAM TRACING: CPT

## 2024-04-01 PROCEDURE — 81001 URINALYSIS AUTO W/SCOPE: CPT

## 2024-04-01 PROCEDURE — 6360000002 HC RX W HCPCS: Performed by: EMERGENCY MEDICINE

## 2024-04-01 PROCEDURE — 84484 ASSAY OF TROPONIN QUANT: CPT

## 2024-04-01 PROCEDURE — 96361 HYDRATE IV INFUSION ADD-ON: CPT

## 2024-04-01 PROCEDURE — 80053 COMPREHEN METABOLIC PANEL: CPT

## 2024-04-01 PROCEDURE — 86901 BLOOD TYPING SEROLOGIC RH(D): CPT

## 2024-04-01 PROCEDURE — 82962 GLUCOSE BLOOD TEST: CPT

## 2024-04-01 RX ORDER — PROCHLORPERAZINE EDISYLATE 5 MG/ML
10 INJECTION INTRAMUSCULAR; INTRAVENOUS ONCE
Status: COMPLETED | OUTPATIENT
Start: 2024-04-01 | End: 2024-04-01

## 2024-04-01 RX ORDER — HYDROXYCHLOROQUINE SULFATE 200 MG/1
200 TABLET, FILM COATED ORAL DAILY
COMMUNITY
Start: 2024-02-24

## 2024-04-01 RX ORDER — DIPHENHYDRAMINE HYDROCHLORIDE 50 MG/ML
12.5 INJECTION INTRAMUSCULAR; INTRAVENOUS ONCE
Status: COMPLETED | OUTPATIENT
Start: 2024-04-01 | End: 2024-04-01

## 2024-04-01 RX ORDER — 0.9 % SODIUM CHLORIDE 0.9 %
1000 INTRAVENOUS SOLUTION INTRAVENOUS ONCE
Status: COMPLETED | OUTPATIENT
Start: 2024-04-01 | End: 2024-04-01

## 2024-04-01 RX ORDER — ASPIRIN 81 MG/1
81 TABLET, CHEWABLE ORAL DAILY
COMMUNITY
Start: 2024-01-30

## 2024-04-01 RX ADMIN — DIPHENHYDRAMINE HYDROCHLORIDE 12.5 MG: 50 INJECTION, SOLUTION INTRAMUSCULAR; INTRAVENOUS at 20:37

## 2024-04-01 RX ADMIN — SODIUM CHLORIDE 1000 ML: 9 INJECTION, SOLUTION INTRAVENOUS at 20:32

## 2024-04-01 RX ADMIN — DIPHENHYDRAMINE HYDROCHLORIDE 12.5 MG: 50 INJECTION, SOLUTION INTRAMUSCULAR; INTRAVENOUS at 21:16

## 2024-04-01 RX ADMIN — PROCHLORPERAZINE EDISYLATE 10 MG: 5 INJECTION INTRAMUSCULAR; INTRAVENOUS at 20:37

## 2024-04-01 RX ADMIN — SODIUM CHLORIDE 1000 ML: 9 INJECTION, SOLUTION INTRAVENOUS at 22:11

## 2024-04-01 ASSESSMENT — PAIN DESCRIPTION - FREQUENCY: FREQUENCY: CONTINUOUS

## 2024-04-01 ASSESSMENT — PAIN DESCRIPTION - LOCATION
LOCATION: HEAD
LOCATION: HEAD

## 2024-04-01 ASSESSMENT — LIFESTYLE VARIABLES
HOW MANY STANDARD DRINKS CONTAINING ALCOHOL DO YOU HAVE ON A TYPICAL DAY: PATIENT DOES NOT DRINK
HOW OFTEN DO YOU HAVE A DRINK CONTAINING ALCOHOL: NEVER

## 2024-04-01 ASSESSMENT — PAIN DESCRIPTION - DESCRIPTORS: DESCRIPTORS: ACHING

## 2024-04-01 ASSESSMENT — PAIN - FUNCTIONAL ASSESSMENT
PAIN_FUNCTIONAL_ASSESSMENT: ACTIVITIES ARE NOT PREVENTED
PAIN_FUNCTIONAL_ASSESSMENT: 0-10
PAIN_FUNCTIONAL_ASSESSMENT: 0-10

## 2024-04-01 ASSESSMENT — PAIN SCALES - GENERAL
PAINLEVEL_OUTOF10: 9
PAINLEVEL_OUTOF10: 4

## 2024-04-01 ASSESSMENT — PAIN DESCRIPTION - ONSET: ONSET: SUDDEN

## 2024-04-01 ASSESSMENT — PAIN DESCRIPTION - PAIN TYPE: TYPE: ACUTE PAIN

## 2024-04-02 LAB
EKG ATRIAL RATE: 99 BPM
EKG DIAGNOSIS: NORMAL
EKG P AXIS: 51 DEGREES
EKG P-R INTERVAL: 144 MS
EKG Q-T INTERVAL: 360 MS
EKG QRS DURATION: 78 MS
EKG QTC CALCULATION (BAZETT): 462 MS
EKG R AXIS: 65 DEGREES
EKG T AXIS: 60 DEGREES
EKG VENTRICULAR RATE: 99 BPM

## 2024-04-02 NOTE — ED PROVIDER NOTES
This particular headache has been present for the last several days.  Headache is similar in character and onset to previous and is not the worst headache of her life.  Symptoms were associated with nausea but no vomiting.  Patient received Compazine 10 mg IV and Benadryl 25 mg IV and 2L of normal saline following which all symptoms were completely resolved.  She rated headache is 0.  She had no further lightheadedness.  Orthostatic vital signs however were still positive after 1 L of normal saline but were significantly improved after the second liter of normal saline.  Following second liter of normal saline lightheadedness was also resolved.  Patient reported feeling significantly improved and was quite anxious to be discharged home.  Patient states she has follow-up appointment with her obstetrician Dr. Pedersen at [facility] at 9 AM tomorrow.  Patient remains hemodynamically stable and neurologically intact throughout her ED stay.    Low suspicion for any acute neurologic emergency such as TIA CVA meningitis encephalitis intracranial hemorrhage.  Headache likely migrainous in etiology.  Regarding lightheadedness and syncope patient is 17.5 weeks pregnant with history of hyperemesis gravidarum and is receiving IV fluids weekly as ordered by her obstetrician.  Patient was orthostatic on arrival.  She reports this has been an ongoing issue with her during this pregnancy.  She states she can usually sit down to rest before she passes out but such was not the case prior to admission.  Finally have low suspicion for any acute cardiopulmonary emergency such as ACS pulmonary embolism cardiac arrhythmia.  Patient is significantly improved at the time of discharge.  She is able to stand and ambulate without difficulty.  She reports good fetal movement and fetal heart tones are 151 bpm.  She adamantly denies any abdominal discomfort whatsoever throughout her ED stay. She is felt to be safe for discharge home.  Emphasis on

## 2024-04-02 NOTE — DISCHARGE INSTRUCTIONS
Return to emergency department if worsening recurrent headache lightheadedness passing out worse in any way or any problems.    Force fluids.    Keep follow-up appointment with OB/GYN in a.m. tomorrow without fail.    No driving or operating any machinery until recheck with PCP or obstetrician.

## 2024-04-02 NOTE — ED NOTES
Patient prepared for and ready to be discharged. Patient discharged at this time to home in care of self in no acute distress after verbalizing understanding of discharge instructions. Patient left after receiving After Visit Summary instructions. IV removed prior to discharge.

## 2024-04-02 NOTE — ED TRIAGE NOTES
Pt brought in by medic into the ER from home with complaint of syncope episode with neck and back pain. Unwitnessed. Pt is 18 weeks pregnant. Pt reports feeling dizzy and headache for the past 4 days. Pt fell on laminate susanne. Pt report 81mg aspirin and Lovenox injections daily. Pt denies pain medication prior to arrival.  Pt is A&OX4. Respirations are even and unlabored. Skin is warm, appropriate for ethnicity, and dry. No acute distress noted.

## 2024-04-04 ENCOUNTER — OFFICE VISIT (OUTPATIENT)
Dept: INTERNAL MEDICINE CLINIC | Age: 34
End: 2024-04-04
Payer: MEDICARE

## 2024-04-04 VITALS — DIASTOLIC BLOOD PRESSURE: 70 MMHG | WEIGHT: 152 LBS | SYSTOLIC BLOOD PRESSURE: 122 MMHG | BODY MASS INDEX: 24.53 KG/M2

## 2024-04-04 DIAGNOSIS — J06.9 UPPER RESPIRATORY TRACT INFECTION, UNSPECIFIED TYPE: Primary | ICD-10-CM

## 2024-04-04 PROCEDURE — 99213 OFFICE O/P EST LOW 20 MIN: CPT | Performed by: INTERNAL MEDICINE

## 2024-04-04 PROCEDURE — G8420 CALC BMI NORM PARAMETERS: HCPCS | Performed by: INTERNAL MEDICINE

## 2024-04-04 PROCEDURE — G8427 DOCREV CUR MEDS BY ELIG CLIN: HCPCS | Performed by: INTERNAL MEDICINE

## 2024-04-04 PROCEDURE — 1036F TOBACCO NON-USER: CPT | Performed by: INTERNAL MEDICINE

## 2024-04-04 RX ORDER — CEFUROXIME AXETIL 500 MG/1
500 TABLET ORAL 2 TIMES DAILY
Qty: 10 TABLET | Refills: 0 | Status: SHIPPED | OUTPATIENT
Start: 2024-04-04 | End: 2024-04-09

## 2024-04-04 ASSESSMENT — PATIENT HEALTH QUESTIONNAIRE - PHQ9
SUM OF ALL RESPONSES TO PHQ QUESTIONS 1-9: 0
SUM OF ALL RESPONSES TO PHQ QUESTIONS 1-9: 0
1. LITTLE INTEREST OR PLEASURE IN DOING THINGS: NOT AT ALL
2. FEELING DOWN, DEPRESSED OR HOPELESS: NOT AT ALL
SUM OF ALL RESPONSES TO PHQ9 QUESTIONS 1 & 2: 0
SUM OF ALL RESPONSES TO PHQ QUESTIONS 1-9: 0
SUM OF ALL RESPONSES TO PHQ QUESTIONS 1-9: 0

## 2024-04-04 NOTE — PROGRESS NOTES
Milka Mcbride (:  1990) is a 33 y.o. adult,Established patient, here for evaluation of the following chief complaint(s):  Ear Problem (Dainage, cough, ear pain)         ASSESSMENT/PLAN:  1. Upper respiratory tract infection, unspecified type  -Viral URI with possible otitis media.  She has not been able to take the cephalexin consistently, will change to cefuroxime but cover for OM as well, 500 mg twice daily x 5 days.  Hopefully she will be able to get these doses then.    Return if symptoms worsen or fail to improve.         Subjective   SUBJECTIVE/OBJECTIVE:  HPI    Started Saturday - dry cough, fevers, chills, malaise, head congestion, sore throat.  She has pressure in the ears, left ear has had some pain.  2 sick contacts at home, both ended up with ear infections.  She has been on Keflex for a UTI but has only been taking it every other day, has not consistently taken the course of antibiotics.  She still has a lot of nausea due to hyperemesis gravidarum.    Review of Systems       Objective   Physical Exam  Vitals reviewed.   Constitutional:       General: Milka Mcbride is not in acute distress.     Appearance: Normal appearance. Milka Mcbride is well-developed.   HENT:      Head: Normocephalic and atraumatic.      Right Ear: Ear canal and external ear normal.      Ears:      Comments: Serous effusion on the right.  Erythema of the left TM with effusion  Cardiovascular:      Rate and Rhythm: Normal rate and regular rhythm.      Heart sounds: Normal heart sounds.   Pulmonary:      Effort: Pulmonary effort is normal. No respiratory distress.      Breath sounds: Normal breath sounds.   Lymphadenopathy:      Cervical: Cervical adenopathy (Shotty) present.   Skin:     General: Skin is warm and dry.   Neurological:      Mental Status: Milka Mcbride is alert.   Psychiatric:         Behavior: Behavior normal.         Thought Content: Thought content normal.         Judgment: Judgment normal.

## 2024-05-20 ENCOUNTER — OFFICE VISIT (OUTPATIENT)
Dept: PULMONOLOGY | Age: 34
End: 2024-05-20
Payer: MEDICARE

## 2024-05-20 VITALS
WEIGHT: 160.2 LBS | SYSTOLIC BLOOD PRESSURE: 126 MMHG | DIASTOLIC BLOOD PRESSURE: 74 MMHG | OXYGEN SATURATION: 99 % | BODY MASS INDEX: 25.86 KG/M2 | HEART RATE: 107 BPM

## 2024-05-20 DIAGNOSIS — G47.33 OSA (OBSTRUCTIVE SLEEP APNEA): Primary | ICD-10-CM

## 2024-05-20 DIAGNOSIS — E03.8 OTHER SPECIFIED HYPOTHYROIDISM: ICD-10-CM

## 2024-05-20 DIAGNOSIS — F51.04 PSYCHOPHYSIOLOGICAL INSOMNIA: ICD-10-CM

## 2024-05-20 PROCEDURE — 1036F TOBACCO NON-USER: CPT | Performed by: NURSE PRACTITIONER

## 2024-05-20 PROCEDURE — G8419 CALC BMI OUT NRM PARAM NOF/U: HCPCS | Performed by: NURSE PRACTITIONER

## 2024-05-20 PROCEDURE — G8427 DOCREV CUR MEDS BY ELIG CLIN: HCPCS | Performed by: NURSE PRACTITIONER

## 2024-05-20 PROCEDURE — 99214 OFFICE O/P EST MOD 30 MIN: CPT | Performed by: NURSE PRACTITIONER

## 2024-05-20 ASSESSMENT — SLEEP AND FATIGUE QUESTIONNAIRES
ESS TOTAL SCORE: 12
HOW LIKELY ARE YOU TO NOD OFF OR FALL ASLEEP WHILE SITTING AND READING: HIGH CHANCE OF DOZING
HOW LIKELY ARE YOU TO NOD OFF OR FALL ASLEEP WHILE SITTING INACTIVE IN A PUBLIC PLACE: SLIGHT CHANCE OF DOZING
HOW LIKELY ARE YOU TO NOD OFF OR FALL ASLEEP WHILE SITTING QUIETLY AFTER LUNCH WITHOUT ALCOHOL: SLIGHT CHANCE OF DOZING
HOW LIKELY ARE YOU TO NOD OFF OR FALL ASLEEP WHEN YOU ARE A PASSENGER IN A CAR FOR AN HOUR WITHOUT A BREAK: MODERATE CHANCE OF DOZING
HOW LIKELY ARE YOU TO NOD OFF OR FALL ASLEEP WHILE LYING DOWN TO REST IN THE AFTERNOON WHEN CIRCUMSTANCES PERMIT: HIGH CHANCE OF DOZING
HOW LIKELY ARE YOU TO NOD OFF OR FALL ASLEEP WHILE SITTING AND TALKING TO SOMEONE: WOULD NEVER DOZE
HOW LIKELY ARE YOU TO NOD OFF OR FALL ASLEEP WHILE WATCHING TV: MODERATE CHANCE OF DOZING
HOW LIKELY ARE YOU TO NOD OFF OR FALL ASLEEP IN A CAR, WHILE STOPPED FOR A FEW MINUTES IN TRAFFIC: WOULD NEVER DOZE

## 2024-05-20 NOTE — ASSESSMENT & PLAN NOTE
Chronic - Stable: Reviewed and analyzed results of physiologic download from patient's machine and reviewed with patients. Supplies and parts as needed for the machine. These are medically necessary. Limit caffeine use after 3 PM. Based on the analyzed data, we will continue with current settings. Doing better after pressure adjustment. Will continue with the current pressure settings as she feels comfortable with the current settings and her symptoms/AHI are controlled. Will continue to monitor her pressure requirement and symptoms as she goes through her pregnancy and post-partum period. She will return in 4-5 mo for follow up. Instructed her to return sooner or contact the office if experiences new or worsening of symptoms. Encouraged her to continue to use her machine each night. Encouraged the patient to contact the office with any questions or concerns.

## 2024-05-20 NOTE — ASSESSMENT & PLAN NOTE
Chronic- Stable.  Discussed the importance of treating sleep apnea as part of the management of this disorder.  Cont any meds per PCP and other physicians.    Continue with trazodone 50-75 mg nightly for initiating sleep. Medication is managed by her psychiatrist. Denies side effects of medication. Stable on the current regimen.

## 2025-04-22 NOTE — PATIENT INSTRUCTIONS
To avoid any disruptions in your treatment, we ask that you please notify us immediately if you have any insurance changes such as a new plan or if you might be losing coverage. If the infusion nurse does not receive your new insurance information, your infusion may be temporarily held until we can check your benefits and get an authorization from your new insurance. Please notify us BEFORE your next infusion, as it may take several weeks to check your benefits and for your new insurance to approve your continuing treatment. Please know that we want to help you in anyway we can, so notify us early! If your insurance coverage changes in ANY way you must do the follow:   Contact our office and speak with the Infusion Nurse. All calls will be returned within 24 hours, except on weekends. We will need a copy of both the front and back of your new card.  Infusion appointments will be placed on HOLD until a new Verification of Benefits and/or Prior Authorization (if required) is acquired   a. A NEW VOB (verification of benefits) must be requested to verify coverage for the medication and infusion fees. b. If a Prior Authorization is needed, we will obtain this and provide information needed to your insurance. c. This process can take 2-3 weeks depending on insurance.  Once the new VOB has been obtained the Infusion RN or a member of the Rheumatology staff will contact you to discuss changes in coverage. A new infusion schedule can be made at this time.  Changes in insurance may disqualify you from receiving infusion coverage, however, we will do everything we can to get your treatment, as we know it is necessary for your disease and quality of living. *As a reminder, anyone on Patient Copay Assistance (Commercial insurance ONLY. This does NOT pertain to Medicare or Medicaid). You should have received information from the drug  regarding how the copay programs work.   If you have not received the information, please let us know. You are responsible for sending in the Explanation of Benefits (EOBs) and [employer] for dates of service to your individual Savings Program and then following thru to ensure it was approved. If you are having difficulty understanding the program, please reach out immediately for assistance. If these are not sent in a timely manner, you will be responsible for the large portion that your insurance doesn't cover, which can be in the thousands! These charges can add up quickly and we don't want you to be stuck with large bills or your treatment be stopped for non-payment of the medication. *Reminder Appointments are schedule one appointment ahead if you come every 6-8 weeks, and 2 ahead if you come every 4 weeks. Please know that we cannot infuse if the physician is not present, so if we cannot accommodate your needs, please feel free to ask what other options are available. The nurse will do the best to accommodate your needs , there may be times that this is not always possible. Please be patient as we try to accommodate patients in a safe, and timely manner. If the physician is out, be aware that you may need to go to our [facility] to have your treatment in a timely manner. If you have questions, please speak with a member of the Rheumatology staff. We are here to help and guide you through this infusion process and ensure that you get the treatment that you need. Thank you for letting us take care of you! Thank you for your cooperation and help! We are grateful to serve you! 22-Apr-2025

## (undated) DEVICE — CABLE BPLR L12FT FLYING LD DISPOSABLE

## (undated) DEVICE — Z DISCONTINUED USE 2275686 GLOVE SURG SZ 8 L12IN FNGR THK13MIL WHT ISOLEX POLYISOPRENE

## (undated) DEVICE — PLATE ES AD W 9FT CRD 2

## (undated) DEVICE — STOCKINETTE,DOUBLE PLY,6X48,STERILE: Brand: MEDLINE

## (undated) DEVICE — DRAPE SURGICAL HAND PROX AURORA

## (undated) DEVICE — 3M™ IOBAN™ 2 ANTIMICROBIAL INCISE DRAPE 6640EZ: Brand: IOBAN™ 2

## (undated) DEVICE — COVER LT HNDL BLU PLAS

## (undated) DEVICE — BLADE OPHTH 180DEG CUT SURF BLU STR SHRP DBL BVL GRINDLESS

## (undated) DEVICE — JEWISH HOSPITAL TURNOVER KIT: Brand: MEDLINE INDUSTRIES, INC.

## (undated) DEVICE — STERILE VELCLOSE ELASTIC BANDAGE, 6IN: Brand: VELCLOSE

## (undated) DEVICE — SYRINGE MED 10ML LUERLOCK TIP W/O SFTY DISP

## (undated) DEVICE — APPLICATOR MEDICATED 26 CC SOLUTION HI LT ORNG CHLORAPREP

## (undated) DEVICE — SUTURE VCRL SZ 4-0 L18IN ABSRB UD L19MM PS-2 3/8 CIR PRIM J496H

## (undated) DEVICE — GLOVE ORTHO 7 1/2   MSG9475

## (undated) DEVICE — GARMENT,MEDLINE,DVT,INT,CALF,MED, GEN2: Brand: MEDLINE

## (undated) DEVICE — PADDING,UNDERCAST,COTTON, 4"X4YD STERILE: Brand: MEDLINE

## (undated) DEVICE — ZIMMER® STERILE DISPOSABLE TOURNIQUET CUFF, DUAL PORT, SINGLE BLADDER, 18 IN. (46 CM)

## (undated) DEVICE — GOWN,SIRUS,POLYRNF,BRTHSLV,XLN/XL,20/CS: Brand: MEDLINE

## (undated) DEVICE — UNDERGLOVE SURG SZ 8 BLU LTX FREE SYN POLYISOPRENE POLYMER

## (undated) DEVICE — DRAPE,U/ SHT,SPLIT,PLAS,STERIL: Brand: MEDLINE

## (undated) DEVICE — GLOVE ORANGE PI 7 1/2   MSG9075

## (undated) DEVICE — LOOP,VESSEL,MAXI,BLUE,2/PK,STERILE: Brand: MEDLINE

## (undated) DEVICE — PODIATRY PK

## (undated) DEVICE — ZIMMER® STERILE DISPOSABLE TOURNIQUET CUFF WITH PLC, DUAL PORT, SINGLE BLADDER, 18 IN. (46 CM)

## (undated) DEVICE — COTTON UNDERCAST PADDING,CRIMPED FINISH: Brand: WEBRIL

## (undated) DEVICE — CORD,CAUTERY,BIPOLAR,STERILE: Brand: MEDLINE

## (undated) DEVICE — E-Z CLEAN, NON-STICK, PTFE COATED, ELECTROSURGICAL BLADE ELECTRODE, 2.5 INCH (6.35 CM): Brand: EZ CLEAN

## (undated) DEVICE — STERILE VELCLOSE ELASTIC BANDAGE, 4IN: Brand: VELCLOSE

## (undated) DEVICE — SUTURE ETHLN SZ 4-0 L18IN NONABSORBABLE BLK L19MM PS-2 3/8 1667H

## (undated) DEVICE — BANDAGE COMPR W1INXL5YD BGE E ADH TENSOPLAST

## (undated) DEVICE — SLING ARM L L17 1/2IN D8.5IN COT POLY DLX W/ SHLDR PD

## (undated) DEVICE — INTENDED FOR TISSUE SEPARATION, AND OTHER PROCEDURES THAT REQUIRE A SHARP SURGICAL BLADE TO PUNCTURE OR CUT.: Brand: BARD-PARKER ® CARBON RIB-BACK BLADES

## (undated) DEVICE — 1010 S-DRAPE TOWEL DRAPE 10/BX: Brand: STERI-DRAPE™

## (undated) DEVICE — PADDING CAST W4INXL4YD HIGHLY ABSRB THAN COT EZ APPL

## (undated) DEVICE — STANDARD HYPODERMIC NEEDLE,POLYPROPYLENE HUB: Brand: MONOJECT

## (undated) DEVICE — SOLUTION IV 1000ML 0.9% SOD CHL

## (undated) DEVICE — PAD,ABDOMINAL,5"X9",ST,LF,25/BX: Brand: MEDLINE INDUSTRIES, INC.

## (undated) DEVICE — GLOVE SURG BIOGEL PI ULTRATCH PF 8

## (undated) DEVICE — 3M™ STERI-STRIP™ REINFORCED ADHESIVE SKIN CLOSURES, R1547, 1/2 IN X 4 IN (12 MM X 100 MM), 6 STRIPS/ENVELOPE: Brand: 3M™ STERI-STRIP™